# Patient Record
Sex: FEMALE | Race: WHITE | NOT HISPANIC OR LATINO | Employment: FULL TIME | URBAN - METROPOLITAN AREA
[De-identification: names, ages, dates, MRNs, and addresses within clinical notes are randomized per-mention and may not be internally consistent; named-entity substitution may affect disease eponyms.]

---

## 2017-03-07 ENCOUNTER — OFFICE VISIT (OUTPATIENT)
Dept: FAMILY MEDICINE CLINIC | Facility: CLINIC | Age: 52
End: 2017-03-07

## 2017-03-07 VITALS
WEIGHT: 140.2 LBS | HEART RATE: 76 BPM | SYSTOLIC BLOOD PRESSURE: 136 MMHG | DIASTOLIC BLOOD PRESSURE: 90 MMHG | BODY MASS INDEX: 27.52 KG/M2 | HEIGHT: 60 IN | OXYGEN SATURATION: 98 %

## 2017-03-07 DIAGNOSIS — J45.30 MILD PERSISTENT ASTHMA WITHOUT COMPLICATION: Primary | ICD-10-CM

## 2017-03-07 DIAGNOSIS — R03.0 ELEVATED BP WITHOUT DIAGNOSIS OF HYPERTENSION: ICD-10-CM

## 2017-03-07 DIAGNOSIS — Z82.49 FAMILY HISTORY OF CORONARY ARTERY DISEASE IN MOTHER: ICD-10-CM

## 2017-03-07 DIAGNOSIS — E78.00 HIGH CHOLESTEROL: ICD-10-CM

## 2017-03-07 PROBLEM — J45.909 ASTHMA: Status: ACTIVE | Noted: 2017-03-07

## 2017-03-07 PROCEDURE — 99204 OFFICE O/P NEW MOD 45 MIN: CPT | Performed by: INTERNAL MEDICINE

## 2017-03-07 NOTE — PROGRESS NOTES
Subjective   Carolin Kauffman is a 52 y.o. female who presents today for:    Hyperlipidemia (New patient); elevated BP; asthma.    History of Present Illness   She reports a long-standing history of asthma, dating back to childhood.  Off medicines until 1992, she was started on Ventolin for an asthma attack has been maintained on it ever since.  She has begun using it more frequently.  She uses a prior to jogging, but also uses it about once daily for wheezing and shortness of breath.  She has not had a pulmonary function test in several years.  She has never taken a maintenance medication.    She was started on pravastatin for hyperlipidemia in 2013, took it through 2014, but stopped that approximately one year later because there was not a significant decrease in her cholesterol levels.  She has not been on any other medication.  She tolerated the pravastatin without side effect.  She denies any cardiovascular or neurologic symptoms.  She does have a family history of heart disease in her mother, who had a heart attack at age 70.    In 2014, for primary care doctor recommended she start a blood pressure medication because of her mother's history of hypertension, and because of an isolated reading in the hypertensive range.  She reports normal blood pressure readings at her gynecologist office.    Ms. Kauffman  reports that she has never smoked. She has never used smokeless tobacco. She reports that she drinks alcohol. She reports that she does not use illicit drugs.         Current Outpatient Prescriptions:   •  calcium citrate-vitamin d (CITRACAL) 200-250 MG-UNIT tablet tablet, Take 2 tablets by mouth Daily., Disp: , Rfl:   •  VENTOLIN  (90 BASE) MCG/ACT inhaler, , Disp: , Rfl:       The following portions of the patient's history were reviewed and updated as appropriate: allergies, current medications, past social history, family hx, and problem list.    Review of Systems   Constitutional: Negative for  "diaphoresis.   Eyes: Negative for visual disturbance.   Respiratory: Positive for cough (Intermittent), shortness of breath (Intermittent) and wheezing (Intermittent). Negative for chest tightness.    Cardiovascular: Negative for chest pain, palpitations and leg swelling.   Gastrointestinal: Negative for abdominal pain.   Musculoskeletal: Negative for myalgias.   Neurological: Negative for dizziness, syncope, numbness and headaches.   Hematological: Does not bruise/bleed easily.       Objective   Vitals:    03/07/17 0903   BP: 136/90   BP Location: Left arm   Patient Position: Sitting   Cuff Size: Adult   Pulse: 76   SpO2: 98%   Weight: 140 lb 3.2 oz (63.6 kg)   Height: 60\" (152.4 cm)     Physical Exam  Well-developed, well-nourished, in no acute distress.  No periorbital edema.  Sclerae are anicteric and the conjunctivae are pink.  No carotid bruit.  No thyromegaly or mass.  No lymphadenopathy.  Regular rate and rhythm without murmur.  Normal S1 and S2.  No S3 or S4.  Clear to auscultation bilaterally with diminished breath sounds throughout all lung fields.  No wheezes or other adventitious breath sounds noted.  Normal respiratory effort noted.  Abdomen is soft, nondistended, nontender, with no mass or hepatosplenomegaly.  No abdominal bruit and normoactive bowel sounds noted.  No lower extremity edema and pedal pulses are full bilaterally.    Assessment/Plan   Carolin was seen today for hyperlipidemia.    Diagnoses and all orders for this visit:    Mild persistent asthma without complication  -     CBC (No Diff)  -     Spirometry With Bronchodilator    High cholesterol  -     Comprehensive Metabolic Panel  -     Lipid Panel    Elevated BP without diagnosis of hypertension  -     Comprehensive Metabolic Panel    Because she is using her Ventolin almost daily, I've asked her to undergo pulmonary function test pre-and post bronchodilator.  We will start her on a LABA/ICS if indicated by those results.  She may need " the spirometry with provocation.    We will not start her on blood pressure medicine today, but instead recommended therapeutic left-sided changes.  She should continue her exercise on a regular basis, particularly low-carb diet, and follow-up for blood pressure recheck in 3-6 months.    We discussed the various aspects of cholesterol management, including diet, exercise, and various statins.  She has only been treated with pravastatin 20 mg in the past, with minimal benefit to her cholesterol levels.  We will make further recommendations after we see her cholesterol levels.

## 2017-03-08 LAB
ALBUMIN SERPL-MCNC: 4.3 G/DL (ref 3.5–5.2)
ALBUMIN/GLOB SERPL: 1.8 G/DL
ALP SERPL-CCNC: 65 U/L (ref 39–117)
ALT SERPL-CCNC: 15 U/L (ref 1–33)
AST SERPL-CCNC: 19 U/L (ref 1–32)
BILIRUB SERPL-MCNC: 0.4 MG/DL (ref 0.1–1.2)
BUN SERPL-MCNC: 14 MG/DL (ref 6–20)
BUN/CREAT SERPL: 16.7 (ref 7–25)
CALCIUM SERPL-MCNC: 9.9 MG/DL (ref 8.6–10.5)
CHLORIDE SERPL-SCNC: 102 MMOL/L (ref 98–107)
CHOLEST SERPL-MCNC: 244 MG/DL (ref 0–200)
CO2 SERPL-SCNC: 26.6 MMOL/L (ref 22–29)
CREAT SERPL-MCNC: 0.84 MG/DL (ref 0.57–1)
ERYTHROCYTE [DISTWIDTH] IN BLOOD BY AUTOMATED COUNT: 14.9 % (ref 11.7–13)
GLOBULIN SER CALC-MCNC: 2.4 GM/DL
GLUCOSE SERPL-MCNC: 88 MG/DL (ref 65–99)
HCT VFR BLD AUTO: 39.7 % (ref 35.6–45.5)
HDLC SERPL-MCNC: 65 MG/DL (ref 40–60)
HGB BLD-MCNC: 13.2 G/DL (ref 11.9–15.5)
LDLC SERPL CALC-MCNC: 150 MG/DL (ref 0–100)
MCH RBC QN AUTO: 28.6 PG (ref 26.9–32)
MCHC RBC AUTO-ENTMCNC: 33.2 G/DL (ref 32.4–36.3)
MCV RBC AUTO: 85.9 FL (ref 80.5–98.2)
PLATELET # BLD AUTO: 236 10*3/MM3 (ref 140–500)
POTASSIUM SERPL-SCNC: 4.3 MMOL/L (ref 3.5–5.2)
PROT SERPL-MCNC: 6.7 G/DL (ref 6–8.5)
RBC # BLD AUTO: 4.62 10*6/MM3 (ref 3.9–5.2)
SODIUM SERPL-SCNC: 141 MMOL/L (ref 136–145)
TRIGL SERPL-MCNC: 145 MG/DL (ref 0–150)
VLDLC SERPL CALC-MCNC: 29 MG/DL (ref 5–40)
WBC # BLD AUTO: 5.18 10*3/MM3 (ref 4.5–10.7)

## 2017-03-22 ENCOUNTER — OFFICE VISIT (OUTPATIENT)
Dept: OBSTETRICS AND GYNECOLOGY | Age: 52
End: 2017-03-22

## 2017-03-22 VITALS
DIASTOLIC BLOOD PRESSURE: 72 MMHG | HEIGHT: 62 IN | BODY MASS INDEX: 25.21 KG/M2 | WEIGHT: 137 LBS | SYSTOLIC BLOOD PRESSURE: 112 MMHG

## 2017-03-22 DIAGNOSIS — Z01.419 ENCOUNTER FOR GYNECOLOGICAL EXAMINATION WITHOUT ABNORMAL FINDING: Primary | ICD-10-CM

## 2017-03-22 PROCEDURE — 99396 PREV VISIT EST AGE 40-64: CPT | Performed by: OBSTETRICS & GYNECOLOGY

## 2017-03-22 NOTE — PROGRESS NOTES
"Subjective     Chief Complaint   Patient presents with   • Gynecologic Exam     AC       History of Present Illness    Carolin Kauffman is a 52 y.o.  who presents for annual exam.  Patient has a significant history of irregular bleeding about 1 year ago with 1 month of daily bleeding.  She had a D&C done in  .  She was benign.   Her that patient had a couple of menses but no significant bleeding since October other than occasionally minimal spotting.    Patient works as a .  Is very active and is taken up running.  She just ran the city run.  Mother has osteoporosis that she developed later in life her mother has vertebral fractures and kyphosis.    Obstetric History:  OB History      Para Term  AB TAB SAB Ectopic Multiple Living    3 3                 Menstrual History:     No LMP recorded.         Current contraception: none  History of abnormal Pap smear: no  Received Gardasil immunization: no  Perform regular self breast exam: no  Family history of uterine or ovarian cancer: no  Family History of colon cancer: no  Family history of breast cancer: no    Mammogram: up to date. Mauricio normal in oct  Colonoscopy: up to date. Age 49 normal  DEXA: not indicated. Mom with osteoporosis. and vertebral fractures    Exercise: exercises 3 times a week  40-90 minutes  Calcium/Vitamin D: adequate intake and uses supplements    The following portions of the patient's history were reviewed and updated as appropriate: allergies, current medications, past family history, past medical history, past social history, past surgical history and problem list.    Review of Systems    A comprehensive review of systems was negative.     Objective   Physical Exam    /72  Ht 61.5\" (156.2 cm)  Wt 137 lb (62.1 kg)  BMI 25.47 kg/m2    General:   alert, appears stated age and cooperative   Neck: no asymmetry, masses, or scars, no adenopathy and thyroid normal to palpation   Heart: regular rate and rhythm "   Lungs: clear to auscultation bilaterally   Abdomen: soft, non-tender, without masses or organomegaly   Breast: inspection negative, no nipple discharge or bleeding, no masses or nodularity palpable   Vulva: normal, Bartholin's, Urethra, [place]'s normal   Vagina: normal mucosa, normal discharge   Cervix: no cervical motion tenderness and no lesions   Uterus: mobile, non-tender, normal shape and consistency   Adnexa: no mass, fullness, tenderness   Rectal: normal rectal, no masses     Assessment/Plan   Carolin was seen today for gynecologic exam.    Diagnoses and all orders for this visit:    Encounter for gynecological examination without abnormal finding  -     IGP, Aptima HPV, Rfx 16 / 18,45    The patient is doing well overall.  I recommend that we start checking bone density next year due to her family history of osteoporosis in her mother with vertebral fractures.  Bleeding is getting lighter and farther apart consistent with menopausal transition.  She will call if her bleeding increases.  All questions answered.  Breast self exam technique reviewed and patient encouraged to perform self-exam monthly.  Discussed healthy lifestyle modifications.  Recommended 30 minutes of aerobic exercise five times per week.  Discussed calcium needs to prevent osteoporosis.

## 2017-03-23 ENCOUNTER — HOSPITAL ENCOUNTER (OUTPATIENT)
Dept: RESPIRATORY THERAPY | Facility: HOSPITAL | Age: 52
Discharge: HOME | End: 2017-03-23
Attending: INTERNAL MEDICINE | Admitting: INTERNAL MEDICINE

## 2017-03-23 ENCOUNTER — HOSPITAL ENCOUNTER (OUTPATIENT)
Dept: CT IMAGING | Facility: HOSPITAL | Age: 52
Discharge: HOME | End: 2017-03-23
Attending: INTERNAL MEDICINE

## 2017-03-23 DIAGNOSIS — Z82.49 FAMILY HISTORY OF CORONARY ARTERY DISEASE IN MOTHER: ICD-10-CM

## 2017-03-23 DIAGNOSIS — E78.00 HIGH CHOLESTEROL: ICD-10-CM

## 2017-03-23 PROCEDURE — 94060 EVALUATION OF WHEEZING: CPT

## 2017-03-23 PROCEDURE — 75571 CT HRT W/O DYE W/CA TEST: CPT

## 2017-03-23 RX ORDER — ALBUTEROL SULFATE 2.5 MG/3ML
2.5 SOLUTION RESPIRATORY (INHALATION) ONCE
Status: COMPLETED | OUTPATIENT
Start: 2017-03-23 | End: 2017-03-23

## 2017-03-23 RX ADMIN — ALBUTEROL SULFATE 2.5 MG: 2.5 SOLUTION RESPIRATORY (INHALATION) at 08:33

## 2017-03-24 ENCOUNTER — TELEPHONE (OUTPATIENT)
Dept: OBSTETRICS AND GYNECOLOGY | Age: 52
End: 2017-03-24

## 2017-03-24 LAB
CYTOLOGIST CVX/VAG CYTO: NORMAL
CYTOLOGY CVX/VAG DOC THIN PREP: NORMAL
DX ICD CODE: NORMAL
HIV 1 & 2 AB SER-IMP: NORMAL
HPV I/H RISK 4 DNA CVX QL PROBE+SIG AMP: NEGATIVE
OTHER STN SPEC: NORMAL
PATH REPORT.FINAL DX SPEC: NORMAL
STAT OF ADQ CVX/VAG CYTO-IMP: NORMAL

## 2017-05-16 DIAGNOSIS — J45.30 MILD PERSISTENT ASTHMA WITHOUT COMPLICATION: Primary | ICD-10-CM

## 2017-05-16 RX ORDER — INHALER, ASSIST DEVICES
SPACER (EA) MISCELLANEOUS
Qty: 1 EACH | Refills: 2 | Status: SHIPPED | OUTPATIENT
Start: 2017-05-16 | End: 2018-03-01

## 2017-05-16 RX ORDER — BUDESONIDE AND FORMOTEROL FUMARATE DIHYDRATE 160; 4.5 UG/1; UG/1
2 AEROSOL RESPIRATORY (INHALATION) 2 TIMES DAILY
Qty: 10.2 G | Refills: 6 | Status: SHIPPED | OUTPATIENT
Start: 2017-05-16 | End: 2018-06-26 | Stop reason: SDUPTHER

## 2017-08-09 DIAGNOSIS — J45.30 MILD PERSISTENT ASTHMA WITHOUT COMPLICATION: Primary | ICD-10-CM

## 2017-10-02 ENCOUNTER — HOSPITAL ENCOUNTER (OUTPATIENT)
Dept: RESPIRATORY THERAPY | Facility: HOSPITAL | Age: 52
Discharge: HOME | End: 2017-10-02
Attending: INTERNAL MEDICINE | Admitting: INTERNAL MEDICINE

## 2017-10-02 DIAGNOSIS — J45.30 MILD PERSISTENT ASTHMA WITHOUT COMPLICATION: ICD-10-CM

## 2017-10-02 PROCEDURE — 94060 EVALUATION OF WHEEZING: CPT

## 2017-10-02 RX ORDER — ALBUTEROL SULFATE 2.5 MG/3ML
2.5 SOLUTION RESPIRATORY (INHALATION) ONCE
Status: COMPLETED | OUTPATIENT
Start: 2017-10-02 | End: 2017-10-02

## 2017-10-02 RX ADMIN — ALBUTEROL SULFATE 2.5 MG: 2.5 SOLUTION RESPIRATORY (INHALATION) at 08:25

## 2017-10-09 ENCOUNTER — TELEPHONE (OUTPATIENT)
Dept: OBSTETRICS AND GYNECOLOGY | Age: 52
End: 2017-10-09

## 2017-10-09 DIAGNOSIS — Z12.31 SCREENING MAMMOGRAM, ENCOUNTER FOR: Primary | ICD-10-CM

## 2017-10-26 ENCOUNTER — HOSPITAL ENCOUNTER (OUTPATIENT)
Dept: MAMMOGRAPHY | Facility: HOSPITAL | Age: 52
Discharge: HOME | End: 2017-10-26
Admitting: PHYSICIAN ASSISTANT

## 2017-10-26 DIAGNOSIS — Z12.31 SCREENING MAMMOGRAM, ENCOUNTER FOR: ICD-10-CM

## 2017-10-26 PROCEDURE — G0202 SCR MAMMO BI INCL CAD: HCPCS

## 2017-10-26 PROCEDURE — 77063 BREAST TOMOSYNTHESIS BI: CPT

## 2017-10-30 ENCOUNTER — TELEPHONE (OUTPATIENT)
Dept: OBSTETRICS AND GYNECOLOGY | Age: 52
End: 2017-10-30

## 2017-10-30 NOTE — TELEPHONE ENCOUNTER
----- Message from LUIS Salguero sent at 10/30/2017  8:56 AM EDT -----  Let her know her mammogram is wnl.

## 2018-02-13 ENCOUNTER — OFFICE VISIT (OUTPATIENT)
Dept: FAMILY MEDICINE CLINIC | Facility: CLINIC | Age: 53
End: 2018-02-13

## 2018-02-13 VITALS
HEIGHT: 62 IN | OXYGEN SATURATION: 98 % | WEIGHT: 140 LBS | BODY MASS INDEX: 25.76 KG/M2 | HEART RATE: 82 BPM | SYSTOLIC BLOOD PRESSURE: 120 MMHG | DIASTOLIC BLOOD PRESSURE: 82 MMHG

## 2018-02-13 DIAGNOSIS — R10.32 LEFT LOWER QUADRANT PAIN: Primary | ICD-10-CM

## 2018-02-13 LAB
ALBUMIN SERPL-MCNC: 4.3 G/DL (ref 3.5–5.2)
ALBUMIN/GLOB SERPL: 1.8 G/DL
ALP SERPL-CCNC: 63 U/L (ref 39–117)
ALT SERPL-CCNC: 16 U/L (ref 1–33)
AST SERPL-CCNC: 21 U/L (ref 1–32)
BASOPHILS # BLD AUTO: 0.03 10*3/MM3 (ref 0–0.2)
BASOPHILS NFR BLD AUTO: 0.5 % (ref 0–1.5)
BILIRUB SERPL-MCNC: 0.3 MG/DL (ref 0.1–1.2)
BUN SERPL-MCNC: 17 MG/DL (ref 6–20)
BUN/CREAT SERPL: 20.2 (ref 7–25)
CALCIUM SERPL-MCNC: 9.5 MG/DL (ref 8.6–10.5)
CHLORIDE SERPL-SCNC: 100 MMOL/L (ref 98–107)
CO2 SERPL-SCNC: 28.1 MMOL/L (ref 22–29)
CREAT SERPL-MCNC: 0.84 MG/DL (ref 0.57–1)
EOSINOPHIL # BLD AUTO: 0.3 10*3/MM3 (ref 0–0.7)
EOSINOPHIL NFR BLD AUTO: 5 % (ref 0.3–6.2)
ERYTHROCYTE [DISTWIDTH] IN BLOOD BY AUTOMATED COUNT: 13 % (ref 11.7–13)
GFR SERPLBLD CREATININE-BSD FMLA CKD-EPI: 71 ML/MIN/1.73
GFR SERPLBLD CREATININE-BSD FMLA CKD-EPI: 86 ML/MIN/1.73
GLOBULIN SER CALC-MCNC: 2.4 GM/DL
GLUCOSE SERPL-MCNC: 91 MG/DL (ref 65–99)
HCT VFR BLD AUTO: 40.8 % (ref 35.6–45.5)
HGB BLD-MCNC: 13.5 G/DL (ref 11.9–15.5)
IMM GRANULOCYTES # BLD: 0.02 10*3/MM3 (ref 0–0.03)
IMM GRANULOCYTES NFR BLD: 0.3 % (ref 0–0.5)
LYMPHOCYTES # BLD AUTO: 1.92 10*3/MM3 (ref 0.9–4.8)
LYMPHOCYTES NFR BLD AUTO: 31.9 % (ref 19.6–45.3)
MCH RBC QN AUTO: 30.1 PG (ref 26.9–32)
MCHC RBC AUTO-ENTMCNC: 33.1 G/DL (ref 32.4–36.3)
MCV RBC AUTO: 90.9 FL (ref 80.5–98.2)
MONOCYTES # BLD AUTO: 0.49 10*3/MM3 (ref 0.2–1.2)
MONOCYTES NFR BLD AUTO: 8.2 % (ref 5–12)
NEUTROPHILS # BLD AUTO: 3.25 10*3/MM3 (ref 1.9–8.1)
NEUTROPHILS NFR BLD AUTO: 54.1 % (ref 42.7–76)
PLATELET # BLD AUTO: 230 10*3/MM3 (ref 140–500)
POTASSIUM SERPL-SCNC: 4.4 MMOL/L (ref 3.5–5.2)
PROT SERPL-MCNC: 6.7 G/DL (ref 6–8.5)
RBC # BLD AUTO: 4.49 10*6/MM3 (ref 3.9–5.2)
SODIUM SERPL-SCNC: 141 MMOL/L (ref 136–145)
WBC # BLD AUTO: 6.01 10*3/MM3 (ref 4.5–10.7)

## 2018-02-13 PROCEDURE — 99213 OFFICE O/P EST LOW 20 MIN: CPT | Performed by: INTERNAL MEDICINE

## 2018-02-13 NOTE — PROGRESS NOTES
"Subjective   Carolin Kauffman is a 53 y.o. female who presents today for:    Abdominal Pain (LLQ x 2 months; intermitten pain)    History of Present Illness   Onset of LLQ abdominal pain in 12/2017, was fairly constant for a few days, then resolved. It has been intermittent since then, most recently 5 days ago and lasting only one day.  Might have improved with some ibuprofen.  Frequency has decreased from a few times per week to once a week.    Her last colonoscopy was on September 11, 2015 was normal.  Report reviewed.    Ms. Kauffman  reports that she has never smoked. She has never used smokeless tobacco. She reports that she drinks alcohol. She reports that she does not use illicit drugs.     Allergies   Allergen Reactions   • Peanut Oil      Palm oil wheezing and throat closure       Current Outpatient Prescriptions:   •  budesonide-formoterol (SYMBICORT) 160-4.5 MCG/ACT inhaler, Inhale 2 puffs 2 (Two) Times a Day., Disp: 10.2 g, Rfl: 6  •  Spacer/Aero-Holding Chambers (AEROCHAMBER MV) inhaler, Use as instructed, Disp: 1 each, Rfl: 2  •  VENTOLIN  (90 BASE) MCG/ACT inhaler, , Disp: , Rfl:   •  calcium citrate-vitamin d (CITRACAL) 200-250 MG-UNIT tablet tablet, Take 2 tablets by mouth Daily., Disp: , Rfl:       Review of Systems   Constitutional: Negative for chills, diaphoresis and fever.   Respiratory: Negative for shortness of breath.    Cardiovascular: Negative for chest pain.   Gastrointestinal: Positive for abdominal pain. Negative for abdominal distention, blood in stool, constipation, diarrhea and nausea.   Genitourinary: Negative for flank pain and vaginal discharge.        Amenorrheic for almost a year         Objective   Vitals:    02/13/18 1005   BP: 120/82   BP Location: Left arm   Patient Position: Sitting   Cuff Size: Adult   Pulse: 82   SpO2: 98%   Weight: 63.5 kg (140 lb)   Height: 156.2 cm (61.5\")     Physical Exam  Well-developed, well-nourished, in good spirits.  Sclerae are " anicteric.  Regular rate and rhythm.  No murmur.  Normal respiratory effort.  Clear bilaterally.  Abdomen is soft, nondistended, with no evidence of hepatosplenomegaly or mass.  She is nontender to palpation over the entire abdomen.  Bowel sounds are normoactive.  No abdominal bruit appreciated.      Assessment/Plan   Carolin was seen today for abdominal pain.    Diagnoses and all orders for this visit:    Left lower quadrant pain  -     CBC & Differential  -     Comprehensive Metabolic Panel  -     POC Urinalysis Dipstick, Automated    Symptoms are waning, and she is a symptomatic today.  We will check the labs as ordered.  We will avoid doing any imaging studies unless her symptoms escalate or new symptoms develop.  She was advised to contact us if either of those 2 situations develop.    She is due to see her gynecologist next month.  If her symptoms smolder until then, she will address with her gynecologist as well in case a pelvic ultrasound as indicated.    She should follow up with us for her asthma within the next 2-3 months as well.

## 2018-03-01 ENCOUNTER — OFFICE VISIT (OUTPATIENT)
Dept: OBSTETRICS AND GYNECOLOGY | Age: 53
End: 2018-03-01

## 2018-03-01 ENCOUNTER — PROCEDURE VISIT (OUTPATIENT)
Dept: OBSTETRICS AND GYNECOLOGY | Age: 53
End: 2018-03-01

## 2018-03-01 VITALS
SYSTOLIC BLOOD PRESSURE: 122 MMHG | DIASTOLIC BLOOD PRESSURE: 72 MMHG | BODY MASS INDEX: 24.66 KG/M2 | HEIGHT: 62 IN | WEIGHT: 134 LBS

## 2018-03-01 DIAGNOSIS — R10.32 LEFT LOWER QUADRANT ABDOMINAL PAIN OF UNKNOWN ETIOLOGY: Primary | ICD-10-CM

## 2018-03-01 DIAGNOSIS — R10.32 LLQ PAIN: ICD-10-CM

## 2018-03-01 DIAGNOSIS — R10.32 LEFT LOWER QUADRANT PAIN: Primary | ICD-10-CM

## 2018-03-01 PROCEDURE — 76830 TRANSVAGINAL US NON-OB: CPT | Performed by: OBSTETRICS & GYNECOLOGY

## 2018-03-01 PROCEDURE — 99213 OFFICE O/P EST LOW 20 MIN: CPT | Performed by: OBSTETRICS & GYNECOLOGY

## 2018-03-01 NOTE — PROGRESS NOTES
"  Chief complaint-lower quadrant pain    History of present illness- Patient is a 53 y.o.  who complains of LLQ pain.  Pain started in December and occurs about once per week.  It is a dull cramping pain that lasts for a few hours.  Better with Advil and worse with movement.  Patient is actually running about 16 miles a week and with running she does not have the pain or any exacerbation.      Patient had a normal colonoscopy at age 49.    She is having a regular soft bowel movements daily without straining.    She is not having any vaginal bleeding.      Review of Systems   Constitutional: Negative for fatigue.   Respiratory: Negative for shortness of breath.    Gastrointestinal: Positive for abdominal pain.   Genitourinary: Negative for dysuria.   Neurological: Negative for headaches.   Psychiatric/Behavioral: Negative for dysphoric mood.     /72  Ht 156.2 cm (61.5\")  Wt 60.8 kg (134 lb)  LMP 2017  BMI 24.91 kg/m2  Physical Exam   Constitutional: She appears well-developed and well-nourished. No distress.   Genitourinary:   Genitourinary Comments: External genitalia appear normal.  Vagina is normal without discharge.  The cervix has no lesions.  On bimanual exam the uterus is anteverted and mobile with no tenderness.  No adnexal masses are palpated.    Rectal exam reveals no masses.   Pulmonary/Chest: Effort normal.   Abdominal: Soft. Bowel sounds are normal. She exhibits no distension and no mass. There is no tenderness. There is no rebound and no guarding. No hernia.   Psychiatric: She has a normal mood and affect. Her behavior is normal. Judgment and thought content normal.       Pelvic ultrasound shows a normal-appearing uterus that measures 6 x 4 x 3 cm.  No masses are seen.  The endometrium is normal at 3 mm.  Left ovary is normal in appearance measuring 1.7 x 1.5 x 1.6 cm.  The right ovary is also normal in appearance measuring 3.1 x 2.5 x 2.0 cm.  No free fluid is seen.    Carolin " was seen today for follow-up.    Diagnoses and all orders for this visit:    Left lower quadrant pain  -     Urine Culture - Urine, Urine, Clean Catch    No GYN source is seen for the left lower quadrant pain.    We discussed possible other causes including musculoskeletal GI or urinary.  Urine dip is not impressive but we will send urine for culture.  Patient's labs were reviewed.  They're remarkable for a normal CMP and CBC.  Patient does not seem to be having any bowel issues.  She is running so musculoskeletal source with seem possible but she is not having any of the pain with running.    We discussed keeping a diary of the pain.  Patient will record which she has eaten or done prior to the pain.  She is scheduled to see Dr. Canela about a month.  She is comfortable with waiting until that time to see if any further evaluation needs to be done.  She will call if there is any worsening of the pain.      20 minutes were spent with the visit.  15 minutes in face-to-face counseling with the patient.

## 2018-03-04 LAB
BACTERIA UR CULT: NORMAL
BACTERIA UR CULT: NORMAL

## 2018-03-05 ENCOUNTER — TELEPHONE (OUTPATIENT)
Dept: OBSTETRICS AND GYNECOLOGY | Age: 53
End: 2018-03-05

## 2018-04-24 ENCOUNTER — OFFICE VISIT (OUTPATIENT)
Dept: FAMILY MEDICINE CLINIC | Facility: CLINIC | Age: 53
End: 2018-04-24

## 2018-04-24 VITALS
HEIGHT: 62 IN | HEART RATE: 57 BPM | WEIGHT: 134 LBS | BODY MASS INDEX: 24.66 KG/M2 | DIASTOLIC BLOOD PRESSURE: 84 MMHG | OXYGEN SATURATION: 97 % | SYSTOLIC BLOOD PRESSURE: 134 MMHG

## 2018-04-24 DIAGNOSIS — Z00.00 ROUTINE GENERAL MEDICAL EXAMINATION AT HEALTH CARE FACILITY: Primary | ICD-10-CM

## 2018-04-24 DIAGNOSIS — Z23 NEED FOR DIPHTHERIA-TETANUS-PERTUSSIS (TDAP) VACCINE, ADULT/ADOLESCENT: ICD-10-CM

## 2018-04-24 PROCEDURE — 90471 IMMUNIZATION ADMIN: CPT | Performed by: INTERNAL MEDICINE

## 2018-04-24 PROCEDURE — 90715 TDAP VACCINE 7 YRS/> IM: CPT | Performed by: INTERNAL MEDICINE

## 2018-04-24 PROCEDURE — 99396 PREV VISIT EST AGE 40-64: CPT | Performed by: INTERNAL MEDICINE

## 2018-04-24 NOTE — PROGRESS NOTES
"Subjective   Carolin Kauffman is a 53 y.o. female who presents today for:    Annual Exam    History of Present Illness   Recall the timing of her last tetanus booster.    She had her last colonoscopy in 2015 and it was normal.    She sees a gynecologist for her routine checkups.  She sees a dermatologist once a year.  She sees an eye doctor regularly.    Ms. Kauffman  reports that she has never smoked. She has never used smokeless tobacco. She reports that she drinks alcohol. She reports that she does not use drugs.     Allergies   Allergen Reactions   • Peanut Oil Anaphylaxis     Palm oil wheezing and throat closure   • Palm Oil [Hydrogenated Palm Oil Glycerides] Rash       Current Outpatient Prescriptions:   •  budesonide-formoterol (SYMBICORT) 160-4.5 MCG/ACT inhaler, Inhale 2 puffs 2 (Two) Times a Day. (Patient taking differently: Inhale 1 puff Daily.), Disp: 10.2 g, Rfl: 6  •  VENTOLIN  (90 BASE) MCG/ACT inhaler, , Disp: , Rfl:       Review of Systems   Constitutional: Negative for diaphoresis.   HENT: Negative.    Eyes: Negative for visual disturbance.   Respiratory: Negative for chest tightness, shortness of breath and wheezing.         Symptoms controlled with Symbicort   Cardiovascular: Negative for chest pain, palpitations and leg swelling.   Gastrointestinal: Negative for abdominal pain.   Genitourinary: Negative.    Musculoskeletal: Negative for myalgias.   Skin: Negative.    Neurological: Negative for dizziness, syncope, numbness and headaches.   Hematological: Does not bruise/bleed easily.   Psychiatric/Behavioral: Negative.          Objective   Vitals:    04/24/18 1535   BP: 134/84   BP Location: Left arm   Patient Position: Sitting   Cuff Size: Adult   Pulse: 57   SpO2: 97%   Weight: 60.8 kg (134 lb)   Height: 156.2 cm (61.5\")     Physical Exam    Well-developed, well-nourished, in no acute distress.  Sclerae are anicteric and the conjunctivae are pink.  No thyromegaly or mass.  No thyroid " tenderness.  No cervical, supraclavicular, or inguinal lymphadenopathy.  Regular rate and rhythm.  No murmur appreciated.  Clear to auscultation bilaterally.  Breath sounds are normal bilaterally.  Respiratory effort is normal.  Abdomen is soft, nondistended, nontender.  Bowel sounds are normoactive.  No bruit appreciated.  No lower extremity edema and pedal pulses are full bilaterally.  Marked freckling over the trunk and extremities.  No suspicious lesions appreciated.  There is a very dark, 2 mm diameter, slightly raised purple nevus on the dorsum of the left hand between the thumb and index finger  Mood is upbeat and affect is appropriate.  No gross motor neurologic deficit appreciated.  She is able to ascend and descend from the exam table fluidly and without assistance.          Carolin was seen today for annual exam.    Diagnoses and all orders for this visit:    Routine general medical examination at health care facility  -     CBC (No Diff)  -     Comprehensive Metabolic Panel  -     Lipid Panel  -     Urinalysis With / Microscopic If Indicated - Urine, Clean Catch    Need for diphtheria-tetanus-pertussis (Tdap) vaccine, adult/adolescent  -     Tdap Vaccine Greater Than or Equal To 6yo IM    We discussed age-appropriate health maintenance issues.  I recommended that she get a tetanus booster today.  I also recommended she get a hepatitis A booster now and in 6 months because of the recent outbreak.  She will check with a local pharmacy for its availability.    She should continue seeing her dermatologist and her gynecologist on a regular basis.  We briefly touched on the shingles vaccine; she would like to wait until next year before considering getting it.    We will see her in follow-up in one year unless there are issues with her labs.  She knows to contact us in the interim if there are any problems.    She is doing well on her Symbicort 1 puff once or twice a day.  I asked her to increase its dose if  she uses her rescue inhaler more than twice a week.  As it stands, she has not used her rescue inhaler in weeks to months.

## 2018-04-26 LAB
ALBUMIN SERPL-MCNC: 4.7 G/DL (ref 3.5–5.5)
ALBUMIN/GLOB SERPL: 1.9 {RATIO} (ref 1.2–2.2)
ALP SERPL-CCNC: 75 IU/L (ref 39–117)
ALT SERPL-CCNC: 20 IU/L (ref 0–32)
APPEARANCE UR: CLEAR
AST SERPL-CCNC: 26 IU/L (ref 0–40)
BACTERIA #/AREA URNS HPF: ABNORMAL /[HPF]
BILIRUB SERPL-MCNC: 0.5 MG/DL (ref 0–1.2)
BILIRUB UR QL STRIP: NEGATIVE
BUN SERPL-MCNC: 14 MG/DL (ref 6–24)
BUN/CREAT SERPL: 16 (ref 9–23)
CALCIUM SERPL-MCNC: 9.7 MG/DL (ref 8.7–10.2)
CHLORIDE SERPL-SCNC: 98 MMOL/L (ref 96–106)
CHOLEST SERPL-MCNC: 241 MG/DL (ref 100–199)
CO2 SERPL-SCNC: 26 MMOL/L (ref 18–29)
COLOR UR: YELLOW
CREAT SERPL-MCNC: 0.86 MG/DL (ref 0.57–1)
EPI CELLS #/AREA URNS HPF: >10 /HPF
ERYTHROCYTE [DISTWIDTH] IN BLOOD BY AUTOMATED COUNT: 13.6 % (ref 12.3–15.4)
GFR SERPLBLD CREATININE-BSD FMLA CKD-EPI: 77 ML/MIN/1.73
GFR SERPLBLD CREATININE-BSD FMLA CKD-EPI: 89 ML/MIN/1.73
GLOBULIN SER CALC-MCNC: 2.5 G/DL (ref 1.5–4.5)
GLUCOSE SERPL-MCNC: 86 MG/DL (ref 65–99)
GLUCOSE UR QL: NEGATIVE
HCT VFR BLD AUTO: 42.1 % (ref 34–46.6)
HDLC SERPL-MCNC: 76 MG/DL
HGB BLD-MCNC: 14.2 G/DL (ref 11.1–15.9)
HGB UR QL STRIP: NEGATIVE
KETONES UR QL STRIP: NEGATIVE
LDLC SERPL CALC-MCNC: 145 MG/DL (ref 0–99)
LEUKOCYTE ESTERASE UR QL STRIP: ABNORMAL
MCH RBC QN AUTO: 29.6 PG (ref 26.6–33)
MCHC RBC AUTO-ENTMCNC: 33.7 G/DL (ref 31.5–35.7)
MCV RBC AUTO: 88 FL (ref 79–97)
MICRO URNS: ABNORMAL
NITRITE UR QL STRIP: NEGATIVE
PH UR STRIP: 5 [PH] (ref 5–7.5)
PLATELET # BLD AUTO: 253 X10E3/UL (ref 150–379)
POTASSIUM SERPL-SCNC: 4 MMOL/L (ref 3.5–5.2)
PROT SERPL-MCNC: 7.2 G/DL (ref 6–8.5)
PROT UR QL STRIP: NEGATIVE
RBC # BLD AUTO: 4.79 X10E6/UL (ref 3.77–5.28)
RBC #/AREA URNS HPF: ABNORMAL /HPF
SODIUM SERPL-SCNC: 139 MMOL/L (ref 134–144)
SP GR UR: 1.02 (ref 1–1.03)
TRIGL SERPL-MCNC: 102 MG/DL (ref 0–149)
UROBILINOGEN UR STRIP-MCNC: 0.2 MG/DL (ref 0.2–1)
VLDLC SERPL CALC-MCNC: 20 MG/DL (ref 5–40)
WBC # BLD AUTO: 6.7 X10E3/UL (ref 3.4–10.8)
WBC #/AREA URNS HPF: ABNORMAL /HPF

## 2018-05-01 ENCOUNTER — OFFICE VISIT (OUTPATIENT)
Dept: FAMILY MEDICINE CLINIC | Facility: CLINIC | Age: 53
End: 2018-05-01

## 2018-05-01 VITALS
SYSTOLIC BLOOD PRESSURE: 134 MMHG | WEIGHT: 132.5 LBS | DIASTOLIC BLOOD PRESSURE: 86 MMHG | OXYGEN SATURATION: 100 % | BODY MASS INDEX: 24.38 KG/M2 | HEART RATE: 60 BPM | HEIGHT: 62 IN

## 2018-05-01 DIAGNOSIS — R30.0 DYSURIA: Primary | ICD-10-CM

## 2018-05-01 DIAGNOSIS — R35.0 URINARY FREQUENCY: ICD-10-CM

## 2018-05-01 LAB
CLARITY, POC: CLEAR
COLOR UR: NORMAL
GLUCOSE UR STRIP-MCNC: NEGATIVE MG/DL
KETONES UR QL: NEGATIVE
LEUKOCYTE EST, POC: NEGATIVE
NITRITE UR-MCNC: NEGATIVE MG/ML
PH UR: 7 [PH] (ref 5–8)
PROT UR STRIP-MCNC: NEGATIVE MG/DL
RBC # UR STRIP: NEGATIVE /UL
SP GR UR: 1.01 (ref 1–1.03)

## 2018-05-01 PROCEDURE — 99213 OFFICE O/P EST LOW 20 MIN: CPT | Performed by: INTERNAL MEDICINE

## 2018-05-01 PROCEDURE — 81003 URINALYSIS AUTO W/O SCOPE: CPT | Performed by: INTERNAL MEDICINE

## 2018-05-01 RX ORDER — SULFAMETHOXAZOLE AND TRIMETHOPRIM 400; 80 MG/1; MG/1
1 TABLET ORAL 2 TIMES DAILY
Qty: 10 TABLET | Refills: 0 | Status: SHIPPED | OUTPATIENT
Start: 2018-05-01 | End: 2019-05-20

## 2018-05-01 RX ORDER — PHENAZOPYRIDINE HYDROCHLORIDE 200 MG/1
200 TABLET, FILM COATED ORAL 3 TIMES DAILY PRN
Qty: 6 TABLET | Refills: 0 | Status: SHIPPED | OUTPATIENT
Start: 2018-05-01 | End: 2019-05-20

## 2018-05-01 NOTE — PROGRESS NOTES
"Urinary Tract Infection (x 3 days)    Relatively acute onset of urinary frequency and urinary urgency 2-3 days ago.  Symptoms have not improved.    ROS: She denies hematuria.  She denies fever and chills.  She denies significant back pain, although she is a little sore after running the mini marathon 4 days ago.    She has had urinary tract infections in the past, but not a recurrent, frequent basis.    She has never smoked.    /86 (BP Location: Left arm, Patient Position: Sitting, Cuff Size: Adult)   Pulse 60   Ht 156.2 cm (61.5\")   Wt 60.1 kg (132 lb 8 oz)   SpO2 100%   Breastfeeding? No   BMI 24.63 kg/m²     EXAM:    Mild suprapubic discomfort.  No CVA tenderness.    Carolin was seen today for urinary tract infection.    Diagnoses and all orders for this visit:    Dysuria  -     POC Urinalysis Dipstick, Automated    Urinary frequency  -     POC Urinalysis Dipstick, Automated  -     sulfamethoxazole-trimethoprim (BACTRIM) 400-80 MG tablet; Take 1 tablet by mouth 2 (Two) Times a Day.  -     phenazopyridine (PYRIDIUM) 200 MG tablet; Take 1 tablet by mouth 3 (Three) Times a Day As Needed for bladder spasms.      Urinalysis is relatively benign, but it is dilute.  We will treat empirically with Bactrim, cautioning her regarding the risk of sunburn.  She will take the Pyridium up to 3 times a day for 2 days, stopping it in the interim if her symptoms subside.  "

## 2018-06-27 RX ORDER — BUDESONIDE AND FORMOTEROL FUMARATE DIHYDRATE 160; 4.5 UG/1; UG/1
2 AEROSOL RESPIRATORY (INHALATION) 2 TIMES DAILY
Qty: 10.2 G | Refills: 6 | Status: SHIPPED | OUTPATIENT
Start: 2018-06-27 | End: 2018-07-02 | Stop reason: SDUPTHER

## 2018-07-02 RX ORDER — BUDESONIDE AND FORMOTEROL FUMARATE DIHYDRATE 160; 4.5 UG/1; UG/1
2 AEROSOL RESPIRATORY (INHALATION) 2 TIMES DAILY
Qty: 10.2 G | Refills: 6 | Status: SHIPPED | OUTPATIENT
Start: 2018-07-02 | End: 2019-07-31 | Stop reason: SDUPTHER

## 2019-02-11 ENCOUNTER — TRANSCRIBE ORDERS (OUTPATIENT)
Dept: ADMINISTRATIVE | Facility: HOSPITAL | Age: 54
End: 2019-02-11

## 2019-02-11 DIAGNOSIS — Z12.31 VISIT FOR SCREENING MAMMOGRAM: Primary | ICD-10-CM

## 2019-02-12 ENCOUNTER — HOSPITAL ENCOUNTER (OUTPATIENT)
Dept: MAMMOGRAPHY | Facility: HOSPITAL | Age: 54
Discharge: HOME | End: 2019-02-12
Admitting: OBSTETRICS & GYNECOLOGY

## 2019-02-12 DIAGNOSIS — Z12.31 VISIT FOR SCREENING MAMMOGRAM: ICD-10-CM

## 2019-02-12 PROCEDURE — 77067 SCR MAMMO BI INCL CAD: CPT

## 2019-02-12 PROCEDURE — 77063 BREAST TOMOSYNTHESIS BI: CPT

## 2019-05-20 ENCOUNTER — OFFICE VISIT (OUTPATIENT)
Dept: INTERNAL MEDICINE | Facility: CLINIC | Age: 54
End: 2019-05-20

## 2019-05-20 VITALS
HEIGHT: 61 IN | DIASTOLIC BLOOD PRESSURE: 92 MMHG | BODY MASS INDEX: 24.73 KG/M2 | WEIGHT: 131 LBS | TEMPERATURE: 97.7 F | RESPIRATION RATE: 16 BRPM | HEART RATE: 86 BPM | SYSTOLIC BLOOD PRESSURE: 136 MMHG | OXYGEN SATURATION: 92 %

## 2019-05-20 DIAGNOSIS — J45.20 MILD INTERMITTENT ASTHMA WITHOUT COMPLICATION: ICD-10-CM

## 2019-05-20 DIAGNOSIS — Z00.00 ENCOUNTER FOR HEALTH MAINTENANCE EXAMINATION: Primary | ICD-10-CM

## 2019-05-20 DIAGNOSIS — Z11.59 NEED FOR HEPATITIS C SCREENING TEST: ICD-10-CM

## 2019-05-20 DIAGNOSIS — I10 ESSENTIAL HYPERTENSION: ICD-10-CM

## 2019-05-20 LAB
BILIRUB BLD-MCNC: NEGATIVE MG/DL
CLARITY, POC: CLEAR
COLOR UR: YELLOW
GLUCOSE UR STRIP-MCNC: NEGATIVE MG/DL
KETONES UR QL: NEGATIVE
LEUKOCYTE EST, POC: ABNORMAL
NITRITE UR-MCNC: NEGATIVE MG/ML
PH UR: 5.5 [PH] (ref 5–8)
PROT UR STRIP-MCNC: NEGATIVE MG/DL
RBC # UR STRIP: NEGATIVE /UL
SP GR UR: 1.01 (ref 1–1.03)
UROBILINOGEN UR QL: NORMAL

## 2019-05-20 PROCEDURE — 99396 PREV VISIT EST AGE 40-64: CPT | Performed by: INTERNAL MEDICINE

## 2019-05-20 PROCEDURE — 81003 URINALYSIS AUTO W/O SCOPE: CPT | Performed by: INTERNAL MEDICINE

## 2019-05-20 PROCEDURE — 93000 ELECTROCARDIOGRAM COMPLETE: CPT | Performed by: INTERNAL MEDICINE

## 2019-05-20 PROCEDURE — 99213 OFFICE O/P EST LOW 20 MIN: CPT | Performed by: INTERNAL MEDICINE

## 2019-05-20 RX ORDER — VALSARTAN 80 MG/1
80 TABLET ORAL DAILY
Qty: 30 TABLET | Refills: 2 | Status: SHIPPED | OUTPATIENT
Start: 2019-05-20 | End: 2019-11-18 | Stop reason: SDUPTHER

## 2019-05-20 RX ORDER — ALBUTEROL SULFATE 90 UG/1
2 AEROSOL, METERED RESPIRATORY (INHALATION) 2 TIMES DAILY
COMMUNITY
Start: 2018-06-25 | End: 2020-12-21 | Stop reason: SDUPTHER

## 2019-05-20 NOTE — PROGRESS NOTES
Subjective   Carolin Kauffman is a 54 y.o. female.     Chief Complaint   Patient presents with   • Annual Exam     new patient         In for initial visit, transition of care, and annual preventive exam.  Former patient of Dr. Henderson.  Sleeps 6 to 7 hours per night.  Exercises 3 days/week.  Energy is good.  Diet is well-balanced.  She has lifelong asthma that is been under pretty good control with medication.      Hypertension   This is a new problem. The current episode started more than 1 year ago. The problem is unchanged. Pertinent negatives include no chest pain, headaches, palpitations or shortness of breath. There are no associated agents to hypertension. Risk factors for coronary artery disease include family history and post-menopausal state. Past treatments include nothing. There is no history of angina, kidney disease, CAD/MI, CVA or heart failure.        The following portions of the patient's history were reviewed and updated as appropriate: allergies, current medications, past social history and problem list.    Outpatient Medications Marked as Taking for the 5/20/19 encounter (Office Visit) with J Carlos Dodson MD   Medication Sig Dispense Refill   • albuterol sulfate HFA (VENTOLIN HFA) 108 (90 Base) MCG/ACT inhaler Take 2 puffs by mouth 2 (Two) Times a Day.     • budesonide-formoterol (SYMBICORT) 160-4.5 MCG/ACT inhaler Inhale 2 puffs 2 (Two) Times a Day. 10.2 g 6       Review of Systems   Constitutional: Positive for diaphoresis. Negative for appetite change, chills, fatigue and fever.   HENT: Negative for congestion, ear pain, hearing loss, nosebleeds, postnasal drip, rhinorrhea, sinus pressure and trouble swallowing.    Eyes: Negative for pain, itching and visual disturbance.   Respiratory: Positive for wheezing. Negative for cough, chest tightness and shortness of breath.    Cardiovascular: Negative for chest pain, palpitations and leg swelling.   Gastrointestinal: Negative for abdominal pain,  anal bleeding, constipation, diarrhea, nausea, rectal pain and vomiting.   Endocrine: Negative for cold intolerance, heat intolerance and polyuria.   Genitourinary: Negative for difficulty urinating, dysuria, flank pain, frequency, hematuria and urgency.   Musculoskeletal: Positive for arthralgias. Negative for back pain and myalgias.   Skin: Negative for rash.   Allergic/Immunologic: Negative for environmental allergies.   Neurological: Negative for dizziness, syncope, speech difficulty, weakness, light-headedness, numbness and headaches.   Hematological: Does not bruise/bleed easily.   Psychiatric/Behavioral: Negative for agitation, confusion and sleep disturbance. The patient is not nervous/anxious.        Objective   Vitals:    05/20/19 1510   BP: 136/92   Pulse: 86   Resp: 16   Temp: 97.7 °F (36.5 °C)   SpO2: 92%      Wt Readings from Last 3 Encounters:   05/20/19 59.4 kg (131 lb)   05/01/18 60.1 kg (132 lb 8 oz)   04/24/18 60.8 kg (134 lb)    Body mass index is 24.35 kg/m².      Physical Exam   Constitutional: She is oriented to person, place, and time. She appears well-developed and well-nourished.   HENT:   Head: Normocephalic and atraumatic.   Right Ear: External ear normal.   Left Ear: External ear normal.   Nose: Nose normal.   Mouth/Throat: Oropharynx is clear and moist.   Eyes: Conjunctivae and EOM are normal. Pupils are equal, round, and reactive to light.   Neck: Normal range of motion. Neck supple. No JVD present. No thyromegaly present.   Cardiovascular: Normal rate, regular rhythm, normal heart sounds and intact distal pulses. Exam reveals no gallop.   No murmur heard.  Pulmonary/Chest: Effort normal and breath sounds normal. No respiratory distress. She has no wheezes. She has no rales.   Abdominal: Soft. Bowel sounds are normal. She exhibits no distension and no mass. There is no tenderness. There is no guarding. No hernia.   Musculoskeletal: Normal range of motion. She exhibits no edema.    Lymphadenopathy:     She has no cervical adenopathy.   Neurological: She is alert and oriented to person, place, and time. She displays normal reflexes. No cranial nerve deficit. Coordination normal.   Skin: Skin is warm and dry.   Psychiatric: She has a normal mood and affect. Her behavior is normal. Judgment and thought content normal.   Nursing note and vitals reviewed.        Problem List Items Addressed This Visit        Cardiovascular and Mediastinum    Essential hypertension       Respiratory    Asthma    Relevant Medications    albuterol sulfate HFA (VENTOLIN HFA) 108 (90 Base) MCG/ACT inhaler      Other Visit Diagnoses     Encounter for health maintenance examination    -  Primary    Relevant Orders    POCT urinalysis dipstick, automated (Completed)        Assessment/Plan   In for initial visit, transition of care, and annual preventive exam.  Overall health is excellent.  She has a history of lifelong asthma that is under good control.  Cholesterol runs little high but she has an excellent HDL cholesterol.  Coronary risk is very low.  Blood pressure runs high and has been for some time.  Will begin on medication today, Diovan 80 mg daily.  We will recheck blood pressure in 1 month and see where she stands.  She is due for annual lab work today which will include CBC, CMP, lipids, UA.  We will check an EKG today with her hypertension.  She is due for Shingrix vaccine.  Due for hep C antibody.  She worries about osteoporosis being on an inhaled steroid.  She is only taking 1 puff a day of Symbicort 160.  I think the risk is awfully low at that dosage range.  Advised patient that although the concern is real it is unlikely to be of clinical significance for her.  We can always try an 80 mcg dose at some point in time.      ECG 12 Lead  Date/Time: 5/20/2019 5:06 PM  Performed by: J Carlos Dodson MD  Authorized by: J Carlos Dodson MD   Comparison: not compared with previous ECG   Previous ECG: no previous ECG  available  Rhythm: sinus bradycardia  Rate: bradycardic  Conduction: conduction normal  ST Segments: ST segments normal  T Waves: T waves normal  QRS axis: normal  Other: no other findings    Clinical impression: normal ECG  Comments: Sinus bradycardia.  Heart rate is 56.  There is intraventricular conduction delay of the right type.  There is borderline evidence of right atrial enlargement.  Nonspecific ST changes.  This is a borderline EKG.  There is no prior EKG available for comparison.                       Dragon disclaimer:   Much of this encounter note is an electronic transcription/translation of spoken language to printed text. The electronic translation of spoken language may permit erroneous, or at times, nonsensical words or phrases to be inadvertently transcribed; Although I have reviewed the note for such errors, some may still exist.

## 2019-05-21 LAB
ALBUMIN SERPL-MCNC: 4.3 G/DL (ref 3.5–5.2)
ALBUMIN/GLOB SERPL: 1.6 G/DL
ALP SERPL-CCNC: 97 U/L (ref 39–117)
ALT SERPL-CCNC: 20 U/L (ref 1–33)
AST SERPL-CCNC: 27 U/L (ref 1–32)
BASOPHILS # BLD AUTO: 0.03 10*3/MM3 (ref 0–0.2)
BASOPHILS NFR BLD AUTO: 0.5 % (ref 0–1.5)
BILIRUB SERPL-MCNC: 0.7 MG/DL (ref 0.2–1.2)
BUN SERPL-MCNC: 12 MG/DL (ref 6–20)
BUN/CREAT SERPL: 13.3 (ref 7–25)
CALCIUM SERPL-MCNC: 10.3 MG/DL (ref 8.6–10.5)
CHLORIDE SERPL-SCNC: 102 MMOL/L (ref 98–107)
CHOLEST SERPL-MCNC: 254 MG/DL (ref 0–200)
CO2 SERPL-SCNC: 28.4 MMOL/L (ref 22–29)
CREAT SERPL-MCNC: 0.9 MG/DL (ref 0.57–1)
EOSINOPHIL # BLD AUTO: 0.25 10*3/MM3 (ref 0–0.4)
EOSINOPHIL NFR BLD AUTO: 4.5 % (ref 0.3–6.2)
ERYTHROCYTE [DISTWIDTH] IN BLOOD BY AUTOMATED COUNT: 12.5 % (ref 12.3–15.4)
GLOBULIN SER CALC-MCNC: 2.7 GM/DL
GLUCOSE SERPL-MCNC: 86 MG/DL (ref 65–99)
HCT VFR BLD AUTO: 45.5 % (ref 34–46.6)
HCV AB S/CO SERPL IA: 0.1 S/CO RATIO (ref 0–0.9)
HDLC SERPL-MCNC: 85 MG/DL (ref 40–60)
HGB BLD-MCNC: 14.6 G/DL (ref 12–15.9)
IMM GRANULOCYTES # BLD AUTO: 0.02 10*3/MM3 (ref 0–0.05)
IMM GRANULOCYTES NFR BLD AUTO: 0.4 % (ref 0–0.5)
LDLC SERPL CALC-MCNC: 149 MG/DL (ref 0–100)
LYMPHOCYTES # BLD AUTO: 1.76 10*3/MM3 (ref 0.7–3.1)
LYMPHOCYTES NFR BLD AUTO: 31.9 % (ref 19.6–45.3)
MCH RBC QN AUTO: 29.5 PG (ref 26.6–33)
MCHC RBC AUTO-ENTMCNC: 32.1 G/DL (ref 31.5–35.7)
MCV RBC AUTO: 91.9 FL (ref 79–97)
MONOCYTES # BLD AUTO: 0.54 10*3/MM3 (ref 0.1–0.9)
MONOCYTES NFR BLD AUTO: 9.8 % (ref 5–12)
NEUTROPHILS # BLD AUTO: 2.91 10*3/MM3 (ref 1.7–7)
NEUTROPHILS NFR BLD AUTO: 52.9 % (ref 42.7–76)
NRBC BLD AUTO-RTO: 0 /100 WBC (ref 0–0.2)
PLATELET # BLD AUTO: 238 10*3/MM3 (ref 140–450)
POTASSIUM SERPL-SCNC: 4.1 MMOL/L (ref 3.5–5.2)
PROT SERPL-MCNC: 7 G/DL (ref 6–8.5)
RBC # BLD AUTO: 4.95 10*6/MM3 (ref 3.77–5.28)
SODIUM SERPL-SCNC: 142 MMOL/L (ref 136–145)
TRIGL SERPL-MCNC: 98 MG/DL (ref 0–150)
VLDLC SERPL CALC-MCNC: 19.6 MG/DL
WBC # BLD AUTO: 5.51 10*3/MM3 (ref 3.4–10.8)

## 2019-05-24 ENCOUNTER — OFFICE VISIT (OUTPATIENT)
Dept: OBSTETRICS AND GYNECOLOGY | Age: 54
End: 2019-05-24

## 2019-05-24 VITALS
BODY MASS INDEX: 23.37 KG/M2 | HEIGHT: 62 IN | DIASTOLIC BLOOD PRESSURE: 74 MMHG | WEIGHT: 127 LBS | SYSTOLIC BLOOD PRESSURE: 112 MMHG

## 2019-05-24 DIAGNOSIS — Z11.51 SPECIAL SCREENING EXAMINATION FOR HUMAN PAPILLOMAVIRUS (HPV): ICD-10-CM

## 2019-05-24 DIAGNOSIS — Z12.4 ROUTINE CERVICAL SMEAR: Primary | ICD-10-CM

## 2019-05-24 PROCEDURE — 99396 PREV VISIT EST AGE 40-64: CPT | Performed by: OBSTETRICS & GYNECOLOGY

## 2019-05-24 NOTE — PROGRESS NOTES
"Subjective     Chief Complaint   Patient presents with   • Gynecologic Exam     AC       History of Present Illness    Carolin Kauffman is a 54 y.o.  who presents for annual exam.  The patient is a runner and is been staying active.  She was recently started on some blood pressure medicine but has not picked it up yet.  Blood pressure is normal here today.  Her mother had osteoporosis with vertebral fractures.  The patient is not taking any calcium or vitamin D supplementation.  No VB or pelvic pain   Obstetric History:  OB History      Para Term  AB Living    3 3 3          SAB TAB Ectopic Molar Multiple Live Births                        Menstrual History:     Patient's last menstrual period was 2017.         Current contraception: post menopausal status  History of abnormal Pap smear: no  Received Gardasil immunization: no  Perform regular self breast exam: yes  Family history of uterine or ovarian cancer: no  Family History of colon cancer: no  Family history of breast cancer: no    Mammogram: up to date.  Colonoscopy: up to date. Age 49 normal   DEXA: ordered.    Exercise: runner 4-5 per week   Calcium/Vitamin D: adequate intake    The following portions of the patient's history were reviewed and updated as appropriate: allergies, current medications, past family history, past medical history, past social history, past surgical history and problem list.    Review of Systems    Review of Systems   Constitutional: Negative for fatigue.   Respiratory: Negative for shortness of breath.    Gastrointestinal: Negative for abdominal pain.   Genitourinary: Negative for dysuria.   Neurological: Negative for headaches.   Psychiatric/Behavioral: Negative for dysphoric mood.         Objective   Physical Exam    /74   Ht 156.2 cm (61.5\")   Wt 57.6 kg (127 lb)   LMP 2017   BMI 23.61 kg/m²     General:   alert, appears stated age and cooperative   Neck: no adenopathy and thyroid normal " to palpation   Heart: regular rate and rhythm   Lungs: clear to auscultation bilaterally   Abdomen: soft, non-tender, without masses or organomegaly   Breast: inspection negative, no nipple discharge or bleeding, no masses or nodularity palpable   Vulva: normal, [place]tholin's, Urethra, [place]'s normal   Vagina: normal mucosa, normal discharge   Cervix: no cervical motion tenderness and no lesions   Uterus: mobile, non-tender, normal shape and consistency   Adnexa: no mass, fullness, tenderness   Rectal: normal rectal, no masses     Assessment/Plan   Carolin was seen today for gynecologic exam.    Diagnoses and all orders for this visit:    Routine cervical smear  -     IGP, Aptima HPV, Rfx 16 / 18,45    Special screening examination for human papillomavirus (HPV)  -     IGP, Aptima HPV, Rfx 16 / 18,45      I recommend that the patient have a bone density due to her low body weight and family history of osteoporosis.  We discussed calcium and vitamin D intake and handouts were given.  I will call the patient with her bone density results.  All questions answered.  Breast self exam technique reviewed and patient encouraged to perform self-exam monthly.  Discussed healthy lifestyle modifications.  Recommended 30 minutes of aerobic exercise five times per week.

## 2019-05-30 ENCOUNTER — TELEPHONE (OUTPATIENT)
Dept: OBSTETRICS AND GYNECOLOGY | Age: 54
End: 2019-05-30

## 2019-05-30 LAB
CYTOLOGIST CVX/VAG CYTO: NORMAL
CYTOLOGY CVX/VAG DOC CYTO: NORMAL
CYTOLOGY CVX/VAG DOC THIN PREP: NORMAL
DX ICD CODE: NORMAL
HIV 1 & 2 AB SER-IMP: NORMAL
HPV I/H RISK 4 DNA CVX QL PROBE+SIG AMP: NEGATIVE
OTHER STN SPEC: NORMAL
STAT OF ADQ CVX/VAG CYTO-IMP: NORMAL

## 2019-05-30 NOTE — TELEPHONE ENCOUNTER
----- Message from Bipin Donald MD sent at 5/30/2019  7:26 AM EDT -----  Please notify pap is normal.

## 2019-07-29 ENCOUNTER — PROCEDURE VISIT (OUTPATIENT)
Dept: OBSTETRICS AND GYNECOLOGY | Age: 54
End: 2019-07-29

## 2019-07-29 DIAGNOSIS — Z78.0 POST-MENOPAUSAL: Primary | ICD-10-CM

## 2019-07-29 PROCEDURE — 77080 DXA BONE DENSITY AXIAL: CPT | Performed by: OBSTETRICS & GYNECOLOGY

## 2019-07-31 RX ORDER — BUDESONIDE AND FORMOTEROL FUMARATE DIHYDRATE 160; 4.5 UG/1; UG/1
2 AEROSOL RESPIRATORY (INHALATION) 2 TIMES DAILY
Qty: 10.2 G | Refills: 6 | Status: SHIPPED | OUTPATIENT
Start: 2019-07-31 | End: 2020-06-09 | Stop reason: SDUPTHER

## 2019-11-18 RX ORDER — VALSARTAN 80 MG/1
80 TABLET ORAL DAILY
Qty: 30 TABLET | Refills: 2 | Status: SHIPPED | OUTPATIENT
Start: 2019-11-18 | End: 2020-01-16 | Stop reason: SDUPTHER

## 2020-01-16 ENCOUNTER — OFFICE VISIT (OUTPATIENT)
Dept: INTERNAL MEDICINE | Facility: CLINIC | Age: 55
End: 2020-01-16

## 2020-01-16 VITALS
RESPIRATION RATE: 16 BRPM | WEIGHT: 135.2 LBS | TEMPERATURE: 97.8 F | HEART RATE: 57 BPM | HEIGHT: 61 IN | BODY MASS INDEX: 25.53 KG/M2 | SYSTOLIC BLOOD PRESSURE: 142 MMHG | DIASTOLIC BLOOD PRESSURE: 78 MMHG | OXYGEN SATURATION: 98 %

## 2020-01-16 DIAGNOSIS — I10 ESSENTIAL HYPERTENSION: Primary | ICD-10-CM

## 2020-01-16 DIAGNOSIS — Z79.899 MEDICATION MANAGEMENT: ICD-10-CM

## 2020-01-16 DIAGNOSIS — E78.00 HIGH CHOLESTEROL: ICD-10-CM

## 2020-01-16 PROCEDURE — 99213 OFFICE O/P EST LOW 20 MIN: CPT | Performed by: INTERNAL MEDICINE

## 2020-01-16 RX ORDER — VALSARTAN 160 MG/1
80 TABLET ORAL DAILY
Qty: 90 TABLET | Refills: 1 | Status: SHIPPED | OUTPATIENT
Start: 2020-01-16 | End: 2020-02-07 | Stop reason: SDUPTHER

## 2020-01-16 NOTE — PROGRESS NOTES
Subjective   Carolin Kauffman is a 54 y.o. female.     Chief Complaint   Patient presents with   • Hypertension         Hypertension   This is a new problem. The current episode started more than 1 month ago. The problem is unchanged. Pertinent negatives include no chest pain, headaches, palpitations, peripheral edema or shortness of breath. Current antihypertension treatment includes angiotensin blockers. There are no compliance problems.  There is no history of angina, kidney disease, CAD/MI, CVA or heart failure.        The following portions of the patient's history were reviewed and updated as appropriate: allergies, current medications, past social history and problem list.    Outpatient Medications Marked as Taking for the 1/16/20 encounter (Office Visit) with J Carlos Dodson MD   Medication Sig Dispense Refill   • albuterol sulfate HFA (VENTOLIN HFA) 108 (90 Base) MCG/ACT inhaler Take 2 puffs by mouth 2 (Two) Times a Day.     • budesonide-formoterol (SYMBICORT) 160-4.5 MCG/ACT inhaler Inhale 2 puffs 2 (Two) Times a Day. 10.2 g 6   • valsartan (DIOVAN) 80 MG tablet Take 1 tablet by mouth Daily. 30 tablet 2       Review of Systems   Respiratory: Negative for shortness of breath.    Cardiovascular: Negative for chest pain and palpitations.   Neurological: Negative for headaches.       Objective   Vitals:    01/16/20 1106   BP: 142/78   Pulse: 57   Resp: 16   Temp: 97.8 °F (36.6 °C)   SpO2: 98%      Wt Readings from Last 3 Encounters:   01/16/20 61.3 kg (135 lb 3.2 oz)   05/24/19 57.6 kg (127 lb)   05/20/19 59.4 kg (131 lb)    Body mass index is 25.14 kg/m².      Physical Exam   Constitutional: She appears well-developed and well-nourished. No distress.   HENT:   Head: Normocephalic and atraumatic.   Neck: Carotid bruit is not present. No thyromegaly present.   Cardiovascular: Normal rate, regular rhythm, normal heart sounds and intact distal pulses. Exam reveals no gallop.   No murmur heard.  Pulmonary/Chest:  Effort normal and breath sounds normal. No respiratory distress. She has no wheezes. She has no rales.   Abdominal: Soft. Bowel sounds are normal. She exhibits no mass. There is no tenderness. There is no guarding.         Problem List Items Addressed This Visit        Cardiovascular and Mediastinum    Essential hypertension - Primary    High cholesterol        Assessment/Plan   In for recheck of hypertension.  Is very mild.  Is persistent.  She is on 80 mg of Diovan per day now without much benefit.  We will boost the dose up to 160 mg daily.  Due for annual preventive exam May 2020.  Annual lab work at that time will include CBC, CMP, lipids, UA.             Dragon disclaimer:   Much of this encounter note is an electronic transcription/translation of spoken language to printed text. The electronic translation of spoken language may permit erroneous, or at times, nonsensical words or phrases to be inadvertently transcribed; Although I have reviewed the note for such errors, some may still exist.

## 2020-02-07 RX ORDER — VALSARTAN 160 MG/1
160 TABLET ORAL DAILY
Qty: 90 TABLET | Refills: 1 | Status: SHIPPED | OUTPATIENT
Start: 2020-02-07 | End: 2020-08-21

## 2020-03-09 ENCOUNTER — TELEPHONE (OUTPATIENT)
Dept: OBSTETRICS AND GYNECOLOGY | Age: 55
End: 2020-03-09

## 2020-03-09 DIAGNOSIS — Z01.419 ENCOUNTER FOR GYNECOLOGICAL EXAMINATION WITHOUT ABNORMAL FINDING: Primary | ICD-10-CM

## 2020-03-09 NOTE — TELEPHONE ENCOUNTER
Patient will call centralized scheduling at [facility] to schedule her mammogram appointment.  Can Dr. Donald place orders?

## 2020-04-21 ENCOUNTER — APPOINTMENT (OUTPATIENT)
Dept: MAMMOGRAPHY | Facility: HOSPITAL | Age: 55
End: 2020-04-21

## 2020-06-09 ENCOUNTER — OFFICE VISIT (OUTPATIENT)
Dept: INTERNAL MEDICINE | Facility: CLINIC | Age: 55
End: 2020-06-09

## 2020-06-09 VITALS
RESPIRATION RATE: 14 BRPM | BODY MASS INDEX: 24.48 KG/M2 | SYSTOLIC BLOOD PRESSURE: 112 MMHG | DIASTOLIC BLOOD PRESSURE: 62 MMHG | OXYGEN SATURATION: 98 % | TEMPERATURE: 97.3 F | HEIGHT: 62 IN | WEIGHT: 133 LBS | HEART RATE: 62 BPM

## 2020-06-09 DIAGNOSIS — M85.89 OSTEOPENIA OF MULTIPLE SITES: ICD-10-CM

## 2020-06-09 DIAGNOSIS — Z00.00 WELLNESS EXAMINATION: Primary | ICD-10-CM

## 2020-06-09 DIAGNOSIS — Z13.21 ENCOUNTER FOR VITAMIN DEFICIENCY SCREENING: ICD-10-CM

## 2020-06-09 LAB
BILIRUB BLD-MCNC: NEGATIVE MG/DL
CLARITY, POC: CLEAR
COLOR UR: ABNORMAL
GLUCOSE UR STRIP-MCNC: NEGATIVE MG/DL
KETONES UR QL: NEGATIVE
LEUKOCYTE EST, POC: ABNORMAL
NITRITE UR-MCNC: NEGATIVE MG/ML
PH UR: 6.5 [PH] (ref 5–8)
PROT UR STRIP-MCNC: NEGATIVE MG/DL
RBC # UR STRIP: NEGATIVE /UL
SP GR UR: 1 (ref 1–1.03)
UROBILINOGEN UR QL: NORMAL

## 2020-06-09 PROCEDURE — 81003 URINALYSIS AUTO W/O SCOPE: CPT | Performed by: INTERNAL MEDICINE

## 2020-06-09 PROCEDURE — 99396 PREV VISIT EST AGE 40-64: CPT | Performed by: INTERNAL MEDICINE

## 2020-06-09 RX ORDER — BUDESONIDE AND FORMOTEROL FUMARATE DIHYDRATE 80; 4.5 UG/1; UG/1
2 AEROSOL RESPIRATORY (INHALATION) 2 TIMES DAILY
Qty: 1 INHALER | Refills: 11 | Status: SHIPPED | OUTPATIENT
Start: 2020-06-09 | End: 2020-12-21 | Stop reason: SDUPTHER

## 2020-06-09 NOTE — PROGRESS NOTES
Subjective   Carolin Kauffman is a 55 y.o. female.     Chief Complaint   Patient presents with   • Annual Exam         In for annual preventive exam.  Sleeps 6 to 7 hours per night.  Exercises 4-5 days/week.  Energy is good.  Diet is well-balanced.  She has lifelong asthma that is been under pretty good control with medication.    Hypertension   This is a new problem. The current episode started more than 1 year ago. The problem is unchanged. Pertinent negatives include no chest pain, headaches, palpitations or shortness of breath. There are no associated agents to hypertension. Risk factors for coronary artery disease include family history and post-menopausal state. Past treatments include nothing. There is no history of angina, kidney disease, CAD/MI, CVA or heart failure.        The following portions of the patient's history were reviewed and updated as appropriate: allergies, current medications, past social history and problem list.    Outpatient Medications Marked as Taking for the 6/9/20 encounter (Office Visit) with J Carlos Dodson MD   Medication Sig Dispense Refill   • albuterol sulfate HFA (VENTOLIN HFA) 108 (90 Base) MCG/ACT inhaler Take 2 puffs by mouth 2 (Two) Times a Day.     • budesonide-formoterol (SYMBICORT) 160-4.5 MCG/ACT inhaler Inhale 2 puffs 2 (Two) Times a Day. 10.2 g 6   • valsartan (DIOVAN) 160 MG tablet Take 1 tablet by mouth Daily. 90 tablet 1       Review of Systems   Constitutional: Negative for appetite change, chills, diaphoresis, fatigue, fever and unexpected weight change.   HENT: Negative for congestion, ear pain, hearing loss, nosebleeds, postnasal drip, rhinorrhea, sinus pressure and trouble swallowing.    Eyes: Negative for pain, itching and visual disturbance.   Respiratory: Positive for wheezing. Negative for cough, chest tightness and shortness of breath.    Cardiovascular: Negative for chest pain, palpitations and leg swelling.   Gastrointestinal: Negative for abdominal  pain, anal bleeding, constipation, diarrhea, nausea, rectal pain and vomiting.   Endocrine: Negative for cold intolerance, heat intolerance and polyuria.   Genitourinary: Negative for difficulty urinating, dysuria, flank pain, frequency, hematuria and urgency.   Musculoskeletal: Positive for arthralgias ( hands). Negative for back pain and myalgias.   Skin: Negative for rash.   Allergic/Immunologic: Negative for environmental allergies.   Neurological: Negative for dizziness, syncope, speech difficulty, weakness, light-headedness, numbness and headaches.   Hematological: Does not bruise/bleed easily.   Psychiatric/Behavioral: Negative for agitation, confusion and sleep disturbance. The patient is not nervous/anxious.        Objective   Vitals:    06/09/20 0833   BP: 112/62   Pulse: 62   Resp: 14   Temp: 97.3 °F (36.3 °C)   SpO2: 98%      Wt Readings from Last 3 Encounters:   06/09/20 60.3 kg (133 lb)   01/16/20 61.3 kg (135 lb 3.2 oz)   05/24/19 57.6 kg (127 lb)    Body mass index is 24.33 kg/m².      Physical Exam   Constitutional: She is oriented to person, place, and time. She appears well-developed and well-nourished.   HENT:   Head: Normocephalic and atraumatic.   Right Ear: External ear normal.   Left Ear: External ear normal.   Nose: Nose normal.   Mouth/Throat: Oropharynx is clear and moist.   Eyes: Pupils are equal, round, and reactive to light. Conjunctivae and EOM are normal.   Neck: Normal range of motion. Neck supple. No JVD present. No thyromegaly present.   Cardiovascular: Normal rate, regular rhythm, normal heart sounds and intact distal pulses. Exam reveals no gallop.   No murmur heard.  Pulmonary/Chest: Effort normal and breath sounds normal. No respiratory distress. She has no wheezes. She has no rales.   Abdominal: Soft. Bowel sounds are normal. She exhibits no distension and no mass. There is no tenderness. There is no guarding. No hernia.   Musculoskeletal: Normal range of motion. She exhibits  no edema.   Lymphadenopathy:     She has no cervical adenopathy.   Neurological: She is alert and oriented to person, place, and time. She displays normal reflexes. No cranial nerve deficit. Coordination normal.   Skin: Skin is warm and dry.   Psychiatric: She has a normal mood and affect. Her behavior is normal. Judgment and thought content normal.   Nursing note and vitals reviewed.        Problem List Items Addressed This Visit     None      Visit Diagnoses     Wellness examination    -  Primary    Relevant Orders    POCT urinalysis dipstick, automated        Assessment/Plan   In for annual preventive exam.  Overall health is excellent.  She has a history of lifelong asthma that is under good control.  Cholesterol runs little high but she has an excellent HDL cholesterol.  Coronary risk is very low.  Blood pressure is under excellent control.  She is due for annual lab work today which will include CBC, CMP, lipids, UA.  Vitamin D level today.  She is due for Shingrix vaccine.  She worries about osteoporosis being on an inhaled steroid.  She is only taking 1 puff a day of Symbicort 160.  I think the risk is very high low at that dosage range.  Advised patient that although the concern is real it is unlikely to be of clinical significance for her.  We can always try an 80 mcg dose and since she is switch her Symbicort every other day I think that would be reasonable.  She has osteopenia on a recent DEXA scan and is now taking some additional calcium.  She is 1.5 hours post coffee with skim milk.  Follow-up preventive exam in 1 year.    Prevention counseling was performed today. The counseling performed was routine health maintenance topics.      The 10-year ASCVD risk score (Jun CISNEROS Jr., et al., 2013) is: 1.9%    Values used to calculate the score:      Age: 55 years      Sex: Female      Is Non- : No      Diabetic: No      Tobacco smoker: No      Systolic Blood Pressure: 112 mmHg      Is  BP treated: Yes      HDL Cholesterol: 85 mg/dL      Total Cholesterol: 254 mg/dL           Dragon disclaimer:   Much of this encounter note is an electronic transcription/translation of spoken language to printed text. The electronic translation of spoken language may permit erroneous, or at times, nonsensical words or phrases to be inadvertently transcribed; Although I have reviewed the note for such errors, some may still exist.

## 2020-06-10 ENCOUNTER — HOSPITAL ENCOUNTER (OUTPATIENT)
Dept: MAMMOGRAPHY | Facility: HOSPITAL | Age: 55
Discharge: HOME | End: 2020-06-10
Admitting: OBSTETRICS & GYNECOLOGY

## 2020-06-10 LAB
25(OH)D3+25(OH)D2 SERPL-MCNC: 26.1 NG/ML (ref 30–100)
ALBUMIN SERPL-MCNC: 4.3 G/DL (ref 3.5–5.2)
ALBUMIN/GLOB SERPL: 1.7 G/DL
ALP SERPL-CCNC: 81 U/L (ref 39–117)
ALT SERPL-CCNC: 20 U/L (ref 1–33)
AST SERPL-CCNC: 21 U/L (ref 1–32)
BASOPHILS # BLD AUTO: 0.04 10*3/MM3 (ref 0–0.2)
BASOPHILS NFR BLD AUTO: 0.8 % (ref 0–1.5)
BILIRUB SERPL-MCNC: 0.4 MG/DL (ref 0.2–1.2)
BUN SERPL-MCNC: 17 MG/DL (ref 6–20)
BUN/CREAT SERPL: 19.1 (ref 7–25)
CALCIUM SERPL-MCNC: 10.1 MG/DL (ref 8.6–10.5)
CHLORIDE SERPL-SCNC: 105 MMOL/L (ref 98–107)
CHOLEST SERPL-MCNC: 241 MG/DL (ref 0–200)
CHOLEST/HDLC SERPL: 3.39 {RATIO}
CO2 SERPL-SCNC: 25.3 MMOL/L (ref 22–29)
CREAT SERPL-MCNC: 0.89 MG/DL (ref 0.57–1)
EOSINOPHIL # BLD AUTO: 0.27 10*3/MM3 (ref 0–0.4)
EOSINOPHIL NFR BLD AUTO: 5.5 % (ref 0.3–6.2)
ERYTHROCYTE [DISTWIDTH] IN BLOOD BY AUTOMATED COUNT: 12.8 % (ref 12.3–15.4)
GLOBULIN SER CALC-MCNC: 2.5 GM/DL
GLUCOSE SERPL-MCNC: 104 MG/DL (ref 65–99)
HCT VFR BLD AUTO: 39.6 % (ref 34–46.6)
HDLC SERPL-MCNC: 71 MG/DL (ref 40–60)
HGB BLD-MCNC: 13.5 G/DL (ref 12–15.9)
IMM GRANULOCYTES # BLD AUTO: 0.02 10*3/MM3 (ref 0–0.05)
IMM GRANULOCYTES NFR BLD AUTO: 0.4 % (ref 0–0.5)
LDLC SERPL CALC-MCNC: 151 MG/DL (ref 0–100)
LYMPHOCYTES # BLD AUTO: 1.44 10*3/MM3 (ref 0.7–3.1)
LYMPHOCYTES NFR BLD AUTO: 29.1 % (ref 19.6–45.3)
MCH RBC QN AUTO: 30.8 PG (ref 26.6–33)
MCHC RBC AUTO-ENTMCNC: 34.1 G/DL (ref 31.5–35.7)
MCV RBC AUTO: 90.2 FL (ref 79–97)
MONOCYTES # BLD AUTO: 0.6 10*3/MM3 (ref 0.1–0.9)
MONOCYTES NFR BLD AUTO: 12.1 % (ref 5–12)
NEUTROPHILS # BLD AUTO: 2.57 10*3/MM3 (ref 1.7–7)
NEUTROPHILS NFR BLD AUTO: 52.1 % (ref 42.7–76)
NRBC BLD AUTO-RTO: 0 /100 WBC (ref 0–0.2)
PLATELET # BLD AUTO: 241 10*3/MM3 (ref 140–450)
POTASSIUM SERPL-SCNC: 4.5 MMOL/L (ref 3.5–5.2)
PROT SERPL-MCNC: 6.8 G/DL (ref 6–8.5)
RBC # BLD AUTO: 4.39 10*6/MM3 (ref 3.77–5.28)
SODIUM SERPL-SCNC: 139 MMOL/L (ref 136–145)
TRIGL SERPL-MCNC: 95 MG/DL (ref 0–150)
VLDLC SERPL CALC-MCNC: 19 MG/DL
WBC # BLD AUTO: 4.94 10*3/MM3 (ref 3.4–10.8)

## 2020-06-10 PROCEDURE — 77067 SCR MAMMO BI INCL CAD: CPT

## 2020-06-10 PROCEDURE — 77063 BREAST TOMOSYNTHESIS BI: CPT

## 2020-06-11 RX ORDER — ACETAMINOPHEN 160 MG
2000 TABLET,DISINTEGRATING ORAL DAILY
COMMUNITY

## 2020-06-16 ENCOUNTER — TELEPHONE (OUTPATIENT)
Dept: INTERNAL MEDICINE | Facility: CLINIC | Age: 55
End: 2020-06-16

## 2020-06-16 NOTE — TELEPHONE ENCOUNTER
Patient has been paying out of pocket for Symbicort. She will continue to pay out of pocket. If she changes her mind, she will let the office know.

## 2020-06-16 NOTE — TELEPHONE ENCOUNTER
I have not seen this before.  If I am reading it correctly, she needs to either be proven to benefit from the Symbicort where she also has to fail a second drug like Breo.  I suspect they want her to switch to Breo 1 puff daily.  1.  Refill x5.

## 2020-06-16 NOTE — TELEPHONE ENCOUNTER
[facility] has denied generic Symbicort due to not meeting criteria:   Has failed to achieve treatment  goals ( shortness of breath, FEV1)  on previous treatment with brand  Symbicort AND one of the  following: Advair Disk, Advair HFA,  Wixela inhub,  fluticasone/salmeterol, Breo Ellipta.  Please advise.

## 2020-07-15 ENCOUNTER — OFFICE VISIT (OUTPATIENT)
Dept: ORTHOPEDIC SURGERY | Facility: CLINIC | Age: 55
End: 2020-07-15

## 2020-07-15 VITALS — BODY MASS INDEX: 23.92 KG/M2 | WEIGHT: 130 LBS | HEIGHT: 62 IN | TEMPERATURE: 98 F

## 2020-07-15 DIAGNOSIS — M25.551 RIGHT HIP PAIN: Primary | ICD-10-CM

## 2020-07-15 DIAGNOSIS — M54.16 LUMBAR RADICULOPATHY: ICD-10-CM

## 2020-07-15 PROCEDURE — 73502 X-RAY EXAM HIP UNI 2-3 VIEWS: CPT | Performed by: NURSE PRACTITIONER

## 2020-07-15 PROCEDURE — 99203 OFFICE O/P NEW LOW 30 MIN: CPT | Performed by: NURSE PRACTITIONER

## 2020-07-15 NOTE — PROGRESS NOTES
"Patient Name: Carolin Kauffman   YOB: 1965  Referring Primary Care Physician: J Carlos Dodson MD  BMI: Body mass index is 23.78 kg/m².    Chief Complaint:    Chief Complaint   Patient presents with   • Right Hip - Pain, Initial Evaluation        HPI: Pt of MWM in the past and presents with hip and low back pain. 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 Pain has been lingering and got worse in May. Had her \"back go out\" in 2016 and has not had a MRI. Denies fall or injury. Pt is very active and reports that pain is keeping her from normal activities.    Carolin Kauffman is a 55 y.o. female who presents today for evaluation of   Chief Complaint   Patient presents with   • Right Hip - Pain, Initial Evaluation       This problem is new to this examiner.     Subjective   Medications:   Home Medications:  Current Outpatient Medications on File Prior to Visit   Medication Sig   • albuterol sulfate HFA (VENTOLIN HFA) 108 (90 Base) MCG/ACT inhaler Take 2 puffs by mouth 2 (Two) Times a Day.   • budesonide-formoterol (Symbicort) 80-4.5 MCG/ACT inhaler Inhale 2 puffs 2 (Two) Times a Day.   • Cholecalciferol (VITAMIN D3) 50 MCG (2000 UT) capsule Take 2,000 Units by mouth Daily.   • Ibuprofen (ADVIL PO) Take  by mouth As Needed.   • valsartan (DIOVAN) 160 MG tablet Take 1 tablet by mouth Daily.     No current facility-administered medications on file prior to visit.      Current Medications:  Scheduled Meds:  Continuous Infusions:  No current facility-administered medications for this visit.   PRN Meds:.    I have reviewed the patient's medical history in detail and updated the computerized patient record.  Review and summarization of old records includes:    Past Medical History:   Diagnosis Date   • Asthma    • Cataract two years ago-right eye   • Family history of coronary artery disease in mother 3/7/2017   • " High cholesterol    • History of medical problems currentely treating for ulnar nerve entrapment   • Hypertension    • Irregular menstrual bleeding    • Menometrorrhagia    • Ovarian cyst    • Ulnar nerve abnormality    • Visual impairment nearsightedness        Past Surgical History:   Procedure Laterality Date   • BREAST BIOPSY Left    • CATARACT EXTRACTION     •  SECTION     •  SECTION     •  SECTION     • COLONOSCOPY  2015    KATHY Parmar MD   • D&C HYSTEROSCOPY          Social History     Occupational History   • Occupation:    Tobacco Use   • Smoking status: Never Smoker   • Smokeless tobacco: Never Used   Substance and Sexual Activity   • Alcohol use: Yes     Frequency: 2-3 times a week     Drinks per session: 1 or 2     Binge frequency: Never     Comment: Caffeine use 3 cups daily   • Drug use: No   • Sexual activity: Yes     Partners: Male     Birth control/protection: Post-menopausal      Social History     Social History Narrative   • Not on file        Family History   Problem Relation Age of Onset   • Hypertension Mother    • Osteoporosis Mother    • Heart attack Mother 70        Second heart Attack 2017   • Stroke Mother    • Hyperlipidemia Mother    • Heart disease Mother    • Melanoma Maternal Grandmother    • Atrial fibrillation Father    • Prostate cancer Father    • Hearing loss Father    • Cancer Father         prostate   • No Known Problems Son    • No Known Problems Son    • No Known Problems Daughter        ROS: 14 point review of systems was performed and all other systems were reviewed and are negative except for documented findings in HPI and today's encounter.     Allergies:   Allergies   Allergen Reactions   • Peanut Oil Anaphylaxis     Palm oil wheezing and throat closure   • Palm Oil [Hydrogenated Palm Oil Glycerides] Rash     Constitutional:  Denies fever, shaking or chills   Eyes:  Denies change in visual acuity   HENT:  Denies  "nasal congestion or sore throat   Respiratory:  Denies cough or shortness of breath   Cardiovascular:  Denies chest pain or severe LE edema   GI:  Denies abdominal pain, nausea, vomiting, bloody stools or diarrhea   Musculoskeletal:  Numbness, tingling, pain, or loss of motor function only as noted above in history of present illness.  : Denies painful urination or hematuria  Integument:  Denies rash, lesion or ulceration   Neurologic:  Denies headache or focal weakness  Endocrine:  Denies lymphadenopathy  Psych:  Denies confusion or change in mental status   Hem:  Denies active bleeding    OBJECTIVE:  Physical Exam: 55 y.o. female  Wt Readings from Last 3 Encounters:   07/15/20 59 kg (130 lb)   06/09/20 60.3 kg (133 lb)   01/16/20 61.3 kg (135 lb 3.2 oz)     Ht Readings from Last 1 Encounters:   07/15/20 157.5 cm (62\")     Body mass index is 23.78 kg/m².  Vitals:    07/15/20 0904   Temp: 98 °F (36.7 °C)     Vital signs reviewed.     General Appearance:    Alert, cooperative, in no acute distress                  Eyes: conjunctiva clear  ENT: external ears and nose atraumatic  CV: no peripheral edema  Resp: normal respiratory effort  Skin: no rashes or wounds; normal turgor  Psych: mood and affect appropriate  Lymph: no nodes appreciated  Neuro: gross sensation intact  Vascular:  Palpable peripheral pulse in noted extremity  Musculoskeletal Extremities: Right hip no pain with internal rotation or over the greater trochanter, no groin pain, no greater trochanter bursa tenderness, diffuse tenderness to low back. Resisted straight leg elicits pain in low back, +PMS and skin is warm, dry and intact, calves are soft and NTTP. Lumbar spine has diffuse tenderness. Ambulates without any assistive devices.    Radiology:   Right hip 2 views no comparison done for pain reveal no acute fracture some joint space narrowing    Assessment:     ICD-10-CM ICD-9-CM   1. Right hip pain M25.551 719.45   2. Lumbar radiculopathy M54.16 " 724.4        Procedures   MRI to lumbar spine will be ordered after lengthy discussion and with pts symptoms will determine if she has pathology in her lumbar spine.     Plan: Biomechanics of pertinent body area discussed.  Risks, benefits, alternatives, comparisons, and complications of accepted medicines, injections, recommendations, surgical procedures, and therapies explained and education provided in laymen's terms. Natural history and expected course of this patient's diagnosis discussed along with evaluation of therapies. Questions answered. When appropriate I also discussed proper use of cane, walker, trekking poles.   EXERCISES:  Advice on benefits of, and types of regular/moderate exercise including biomechanical forces involved as it pertains to this complaint.  MEDICATIONS:  Prescription, OTC and Monitoring of Medications per orders to address ortho complaints; Evaluation and discussion of safety, precautions, side effects, and warnings given especially of long term NSAID or steroid therapy.    RICE: Rest, ice, compression, and elevation therapy, Cryotherapy/brachy therapy, and or OTC linaments as indicated with instructions.   MRI.      7/15/2020    Much of this encounter note is an electronic transcription/translation of spoken language to printed text. The electronic translation of spoken language may permit erroneous, or at times, nonsensical words or phrases to be inadvertently transcribed; Although I have reviewed the note for such errors, some may still exist

## 2020-07-29 ENCOUNTER — TELEPHONE (OUTPATIENT)
Dept: ORTHOPEDIC SURGERY | Facility: CLINIC | Age: 55
End: 2020-07-29

## 2020-07-29 ENCOUNTER — HOSPITAL ENCOUNTER (OUTPATIENT)
Dept: MRI IMAGING | Facility: HOSPITAL | Age: 55
Discharge: HOME | End: 2020-07-29
Admitting: NURSE PRACTITIONER

## 2020-07-29 DIAGNOSIS — M54.16 LUMBAR RADICULOPATHY: ICD-10-CM

## 2020-07-29 DIAGNOSIS — M54.16 LUMBAR RADICULOPATHY: Primary | ICD-10-CM

## 2020-07-29 DIAGNOSIS — M25.551 RIGHT HIP PAIN: ICD-10-CM

## 2020-07-29 PROCEDURE — 72148 MRI LUMBAR SPINE W/O DYE: CPT

## 2020-07-29 NOTE — TELEPHONE ENCOUNTER
----- Message from WILBERT Loya sent at 7/29/2020 10:30 AM EDT -----  Pt has some degenerative changes in her spine with a spondylolithesis. I would suggest she follow with [facility] for evaluation and treatment options. She is an avid runner. See how she is doing may benefit from physical therapy.  IMPRESSION:     Mild dextroconvex curvature of the lumbar spine is noted with its apex  centered at L2-L3. There is grade 1 degenerative anterior  spondylolisthesis of L4 on L5 by approximately 6 mm.     There is mild bilateral foraminal narrowing at the L4-L5 level secondary  to disc bulging and facet hypertrophy.     No significant canal or foraminal narrowing is noted throughout the  remaining lumbar spine.     This report was finalized on 7/29/2020 10:10 AM by Dr. Stephon Gant M.D.

## 2020-07-29 NOTE — TELEPHONE ENCOUNTER
Patient called back and was informed of her results and options.  She wants to try PT at [facility] and will follow up with TROY.  She is aware someone will call her to set up the appt with him.

## 2020-08-18 ENCOUNTER — OFFICE VISIT (OUTPATIENT)
Dept: ORTHOPEDIC SURGERY | Facility: CLINIC | Age: 55
End: 2020-08-18

## 2020-08-18 VITALS — BODY MASS INDEX: 24.48 KG/M2 | HEIGHT: 62 IN | TEMPERATURE: 96.7 F | WEIGHT: 133 LBS

## 2020-08-18 DIAGNOSIS — M54.50 LUMBAR SPINE PAIN: Primary | ICD-10-CM

## 2020-08-18 DIAGNOSIS — M43.16 SPONDYLOLISTHESIS OF LUMBAR REGION: ICD-10-CM

## 2020-08-18 PROCEDURE — 99212 OFFICE O/P EST SF 10 MIN: CPT | Performed by: ORTHOPAEDIC SURGERY

## 2020-08-18 PROCEDURE — 72100 X-RAY EXAM L-S SPINE 2/3 VWS: CPT | Performed by: ORTHOPAEDIC SURGERY

## 2020-08-18 NOTE — PROGRESS NOTES
New patient or new problem visit    CC: Right hip pain    HPI: She describes right buttock pain radiating sometimes into the right leg as well as some low back pain predominantly the former.  Pain is improving at this point but was moderate and associated with running of which she does up to 20 miles per week.    PFSH: See attached    ROS: No bowel or bladder complaints no numbness tingling weakness    PE: Constitutional: Vital signs above-noted.  Awake, alert and oriented    Psychiatric: Affect and insight do not appear grossly disturbed.    Pulmonary: Breathing is unlabored, color is good.    Skin: Warm, dry and normal turgor    Cardiac: Pedal pulses intact.  No edema.    Eyesight and hearing appear adequate for examination purposes      Musculoskeletal:  There is no tenderness to percussion and palpation of the spine. Motion appears undisturbed.  Posture is unremarkable to coronal and sagittal inspection.    The skin about the area is not inspected.  There is no palpable or visible deformity.  There is no local spasm.  Figure-of-four test is negative.       Neurologic:   Reflexes are 2+ and symmetrical in the patellae and achilles.   Motor function is undisturbed in quadriceps, EHL, and gastrocnemius   sensation appears symmetrically intact to light touch.  In the bilateral lower extremities there is no evidence of atrophy.   Clonus is absent..  Gait appears undisturbed.  SLR test negative    XRAY: Plain film x-rays show very slight well-balanced scoliosis and L4-5 spondylolisthesis which measures 7 mm.  MRI does not show much in way of stenosis but the spondylolisthesis measures 2 or 3 mm so there is component of relative instability.    Other: n/a    Impression: L4-5 spondylolisthesis with spinal stenosis is the likely source of her radiating hip pain which is now improving    Plan: I think long-term cardiovascular exercise is helping her and she should continue to run with a moderate schedule as at present.   She will also do physical therapy for the back and I will see her back as needed

## 2020-08-21 RX ORDER — VALSARTAN 160 MG/1
TABLET ORAL
Qty: 90 TABLET | Refills: 3 | Status: SHIPPED | OUTPATIENT
Start: 2020-08-21 | End: 2021-08-27

## 2020-09-11 ENCOUNTER — OFFICE VISIT (OUTPATIENT)
Dept: OBSTETRICS AND GYNECOLOGY | Age: 55
End: 2020-09-11

## 2020-09-11 VITALS
DIASTOLIC BLOOD PRESSURE: 64 MMHG | WEIGHT: 131 LBS | SYSTOLIC BLOOD PRESSURE: 108 MMHG | BODY MASS INDEX: 24.11 KG/M2 | HEIGHT: 62 IN

## 2020-09-11 DIAGNOSIS — Z01.419 ENCOUNTER FOR GYNECOLOGICAL EXAMINATION WITHOUT ABNORMAL FINDING: ICD-10-CM

## 2020-09-11 DIAGNOSIS — M85.89 OSTEOPENIA OF MULTIPLE SITES: Primary | ICD-10-CM

## 2020-09-11 PROCEDURE — 99396 PREV VISIT EST AGE 40-64: CPT | Performed by: OBSTETRICS & GYNECOLOGY

## 2020-09-11 NOTE — PROGRESS NOTES
Subjective     Chief Complaint   Patient presents with   • Gynecologic Exam     AC. Last pap 19 normal. Pt had MG 6-.        History of Present Illness    Carolin Kauffman is a 55 y.o.  who presents for annual exam.  The patient has had some back problems.  She is still trying to run for exercise.  No vaginal bleeding or pelvic pain.  Patient's bone density last year showed osteopenia at the hip but her FRAX was normal.  She does have a family history of osteoporosis.  Her mother had a vertebral fracture around age 60.  Patient does get adequate vitamin D and calcium.  She is not having any discomfort from vaginal atrophy.     Obstetric History:  OB History        3    Para   3    Term   3            AB        Living           SAB        TAB        Ectopic        Molar        Multiple        Live Births                   Menstrual History:     Patient's last menstrual period was 2017.         Current contraception: post menopausal status  History of abnormal Pap smear: no  Received Gardasil immunization: no  Perform regular self breast exam : no  Family history of uterine or ovarian cancer: no  Family History of colon cancer: no  Family history of breast cancer: no    Mammogram: up to date.  Colonoscopy: up to date.   DEXA: up to date. Osteopenia 2019 Frax normal, mom compression fractures     Exercise: runner   Calcium/Vitamin D: adequate intake and uses supplements    The following portions of the patient's history were reviewed and updated as appropriate: allergies, current medications, past family history, past medical history, past social history, past surgical history and problem list.    Review of Systems    Review of Systems   Constitutional: Negative for fatigue.   Respiratory: Negative for shortness of breath.    Gastrointestinal: Negative for abdominal pain.   Genitourinary: Negative for dysuria.   Neurological: Negative for headaches.   Psychiatric/Behavioral:  "Negative for dysphoric mood.         Objective   Physical Exam    /64   Ht 157.5 cm (62\")   Wt 59.4 kg (131 lb)   LMP 08/01/2017   Breastfeeding No   BMI 23.96 kg/m²     General:   alert, appears stated age and cooperative   Neck: thyroid normal to palpation   Heart: regular rate and rhythm   Lungs: clear to auscultation bilaterally   Abdomen: soft, non-tender, without masses or organomegaly   Breast: inspection negative, no nipple discharge or bleeding, no masses or nodularity palpable   Vulva: normal, Bartholin's, Urethra, Layton's normal   Vagina:  Vaginal atrophy and pallor are noted   Cervix: no cervical motion tenderness and no lesions   Uterus: non-tender, normal shape and consistency   Adnexa: no mass, fullness, tenderness   Rectal: normal rectal, no masses     Assessment/Plan   Carolin was seen today for gynecologic exam.    Diagnoses and all orders for this visit:    Osteopenia of multiple sites    Encounter for gynecological examination without abnormal finding    Patient is doing well overall.  Her blood pressure and weight are good today.  She will continue running.  We discussed adding yoga to help with spine flexibility.  She will follow-up here in 1 year.    Pap smear was normal last year.  We will plan to repeat in 1 to 2 years.  Repeat bone density in 1 year.    All questions answered.  Breast self exam technique reviewed and patient encouraged to perform self-exam monthly.  Discussed healthy lifestyle modifications.  Recommended 30 minutes of aerobic exercise five times per week.  Discussed calcium needs to prevent osteoporosis.                 "

## 2020-12-21 RX ORDER — ALBUTEROL SULFATE 90 UG/1
2 AEROSOL, METERED RESPIRATORY (INHALATION) 2 TIMES DAILY
Qty: 18 G | Refills: 5 | Status: SHIPPED | OUTPATIENT
Start: 2020-12-21 | End: 2022-05-31

## 2020-12-21 RX ORDER — BUDESONIDE AND FORMOTEROL FUMARATE DIHYDRATE 160; 4.5 UG/1; UG/1
AEROSOL RESPIRATORY (INHALATION)
Qty: 10.2 G | Refills: 5 | Status: SHIPPED | OUTPATIENT
Start: 2020-12-21 | End: 2022-05-31

## 2021-02-13 ENCOUNTER — HOSPITAL ENCOUNTER (EMERGENCY)
Facility: HOSPITAL | Age: 56
Discharge: HOME | End: 2021-02-13
Attending: EMERGENCY MEDICINE | Admitting: EMERGENCY MEDICINE

## 2021-02-13 ENCOUNTER — APPOINTMENT (OUTPATIENT)
Dept: CT IMAGING | Facility: HOSPITAL | Age: 56
End: 2021-02-13

## 2021-02-13 VITALS
HEART RATE: 70 BPM | HEIGHT: 62 IN | WEIGHT: 132 LBS | DIASTOLIC BLOOD PRESSURE: 91 MMHG | RESPIRATION RATE: 16 BRPM | TEMPERATURE: 97.5 F | OXYGEN SATURATION: 100 % | BODY MASS INDEX: 24.29 KG/M2 | SYSTOLIC BLOOD PRESSURE: 153 MMHG

## 2021-02-13 DIAGNOSIS — S01.01XA OCCIPITAL SCALP LACERATION, INITIAL ENCOUNTER: Primary | ICD-10-CM
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
## 2021-02-13 PROCEDURE — 99283 EMERGENCY DEPT VISIT LOW MDM: CPT

## 2021-02-13 PROCEDURE — 25010000002 TDAP 5-2.5-18.5 LF-MCG/0.5 SUSPENSION: Performed by: NURSE PRACTITIONER

## 2021-02-13 PROCEDURE — 70450 CT HEAD/BRAIN W/O DYE: CPT

## 2021-02-13 PROCEDURE — 90715 TDAP VACCINE 7 YRS/> IM: CPT | Performed by: NURSE PRACTITIONER

## 2021-02-13 PROCEDURE — 90471 IMMUNIZATION ADMIN: CPT | Performed by: NURSE PRACTITIONER

## 2021-02-13 RX ORDER — ACETAMINOPHEN 500 MG
1000 TABLET ORAL ONCE
Status: COMPLETED | OUTPATIENT
Start: 2021-02-13 | End: 2021-02-13

## 2021-02-13 RX ADMIN — TETANUS TOXOID, REDUCED DIPHTHERIA TOXOID AND ACELLULAR PERTUSSIS VACCINE, ADSORBED 0.5 ML: 5; 2.5; 8; 8; 2.5 SUSPENSION INTRAMUSCULAR at 20:15

## 2021-02-13 RX ADMIN — ACETAMINOPHEN 1000 MG: 500 TABLET, FILM COATED ORAL at 20:02

## 2021-02-13 NOTE — ED TRIAGE NOTES
All triage performed with this RN wearing appropriate PPE.  Pt placed in mask upon arrival to ED.  Patient slipped and fell on the ice hitting her head on a rock. She denies LOC or taking blood thinners. She has a laceration on the back of her head.

## 2021-02-14 NOTE — ED PROVIDER NOTES
EMERGENCY DEPARTMENT ENCOUNTER    Room Number:  08/08  Date seen:  2/13/2021  Time seen: 19:46 EST  PCP: J Carlos Dodson MD  Historian: patient  HPI:  Chief complaint: Scalp laceration  A complete HPI/ROS/PMH/PSH/SH/FH are unobtainable due to: Not applicable  Context:Carolin Kauffman is a 56 y.o. female who presents to the ED with c/o small occipital scalp plaque sustained after slipping on ice and hitting her head on a rock.  She states she was strongly urged by her adult children to come to the emergency room for evaluation.  She does have a mild headache and states that the headache is not made better or worse by anything.  She has no additional injuries specifically hand or wrist injuries and has no complaints of back pain.  She denies LOC and is not on any blood thinners.  She is unsure of her tetanus status.    Patient was placed in face mask in first look. Patient was wearing facemask when I entered the room and throughout our encounter. I wore full protective equipment throughout this patient encounter including a face mask, eye shield and gloves. Hand hygiene/washing of hands was performed before donning protective equipment and after removal when leaving the room.    MEDICAL RECORD REVIEW    ALLERGIES  Peanut oil and Palm oil [hydrogenated palm oil glycerides]    PAST MEDICAL HISTORY  Active Ambulatory Problems     Diagnosis Date Noted   • Lumbar radiculopathy 05/16/2016   • Asthma 03/07/2017   • High cholesterol 03/07/2017   • Essential hypertension 03/07/2017   • Family history of coronary artery disease in mother 03/07/2017   • Osteopenia of multiple sites 06/09/2020     Resolved Ambulatory Problems     Diagnosis Date Noted   • No Resolved Ambulatory Problems     Past Medical History:   Diagnosis Date   • Cataract two years ago-right eye   • History of medical problems currentely treating for ulnar nerve entrapment   • Hypertension    • Irregular menstrual bleeding    • Menometrorrhagia    • Ovarian cyst     • Ulnar nerve abnormality    • Visual impairment nearsightedness       PAST SURGICAL HISTORY  Past Surgical History:   Procedure Laterality Date   • BREAST BIOPSY Left    • CATARACT EXTRACTION     •  SECTION     •  SECTION     •  SECTION     • COLONOSCOPY  2015    KATHY Parmar MD   • D&C HYSTEROSCOPY         FAMILY HISTORY  Family History   Problem Relation Age of Onset   • Hypertension Mother    • Osteoporosis Mother    • Heart attack Mother 70        Second heart Attack 2017   • Stroke Mother    • Hyperlipidemia Mother    • Heart disease Mother    • Melanoma Maternal Grandmother    • Atrial fibrillation Father    • Prostate cancer Father    • Hearing loss Father    • Cancer Father         prostate   • No Known Problems Son    • No Known Problems Son    • No Known Problems Daughter        SOCIAL HISTORY  Social History     Socioeconomic History   • Marital status:      Spouse name: Not on file   • Number of children: Not on file   • Years of education: Not on file   • Highest education level: Not on file   Occupational History   • Occupation:    Tobacco Use   • Smoking status: Never Smoker   • Smokeless tobacco: Never Used   Substance and Sexual Activity   • Alcohol use: Yes     Alcohol/week: 2.0 standard drinks     Types: 2 Glasses of wine per week     Frequency: 2-3 times a week     Drinks per session: 1 or 2     Binge frequency: Never     Comment: Caffeine use 3 cups daily   • Drug use: No   • Sexual activity: Yes     Partners: Male     Birth control/protection: Post-menopausal       REVIEW OF SYSTEMS  Review of Systems    All systems reviewed and negative except for those discussed in HPI.     PHYSICAL EXAM    ED Triage Vitals   Temp Heart Rate Resp BP SpO2   21 1809 21 1809 21 1809 21 1818 21 1809   97.5 °F (36.4 °C) 70 16 171/97 90 %      Temp src Heart Rate Source Patient Position BP Location FiO2 (%)   21  1809 02/13/21 1809 02/13/21 1819 02/13/21 1819 --   Tympanic Monitor Sitting Right arm      Physical Exam   HENT:   Head: Normocephalic. Head is with abrasion, with contusion and with laceration.           I have reviewed the triage vital signs and nursing notes.      GENERAL: not distressed  HENT: nares patent, mm moist, see diagram for scalp laceration  EYES: no scleral icterus, PERRL, EOMI  NECK: no ROM limitations  CV: regular rhythm, rate  normal  RESPIRATORY: normal effort,   ABDOMEN: soft  : deferred  MUSCULOSKELETAL: no deformity, no thoracic or lumbar discomfort, no bilateral hand or wrist pain  NEURO: alert, moves all extremities, follows commands  SKIN: warm, dry    Laceration Repair    Date/Time: 2/13/2021 8:15 PM  Performed by: Susana Pineda APRN  Authorized by: Aguilar Cam MD     Consent:     Consent obtained:  Verbal    Consent given by:  Patient    Risks discussed:  Pain and poor cosmetic result    Alternatives discussed:  No treatment  Anesthesia (see MAR for exact dosages):     Anesthesia method:  None  Laceration details:     Location:  Scalp    Scalp location:  Occipital    Length (cm):  0.5  Repair type:     Repair type:  Simple  Pre-procedure details:     Preparation:  Patient was prepped and draped in usual sterile fashion and imaging obtained to evaluate for foreign bodies  Exploration:     Wound exploration: entire depth of wound probed and visualized      Wound extent: no muscle damage noted, no nerve damage noted, no underlying fracture noted and no vascular damage noted      Contaminated: no    Treatment:     Area cleansed with:  Sada-Clens    Amount of cleaning:  Extensive    Irrigation solution:  Sterile saline    Irrigation volume:  50    Irrigation method:  Syringe    Visualized foreign bodies/material removed: no    Skin repair:     Repair method:  Staples    Number of staples:  1  Approximation:     Approximation:  Close  Post-procedure details:     Dressing:  Antibiotic  ointment    Patient tolerance of procedure:  Tolerated well, no immediate complications            RADIOLOGY RESULTS  CT Head Without Contrast   Final Result         Electronically signed by Beau Dyer M.D. on 02-13-21 at 2032            PROGRESS, DATA ANALYSIS, CONSULTS AND MEDICAL DECISION MAKING  All labs have been independently reviewed by me.  All radiology studies have been reviewed by me and discussed with radiologist dictating the report.  EKG's independently viewed and interpreted by me unless stated otherwise. Discussion below represents my analysis of pertinent findings related to patient's condition, differential diagnosis, treatment plan and final disposition.      DDX:slip and fall on ice, occipital scalp hematoma, scalp laceration      MDM: CT brain nonacute.  Patient's tetanus status was updated.  Headache was treated with Tylenol.  Laceration was stapled and wound care instructions provided to the patient.     Reviewed pt's history and workup with Dr. Dumont.  After a bedside evaluation, Dr. Dumont agrees with the plan of care.    The patient's history, physical exam, and lab findings were discussed with the physician, who also performed a face to face history and physical exam.  I discussed all results and noted any abnormalities with patient.  Discussed absoute need to recheck abnormalities with their family physician.  I answered any of the patient's questions.  Discussed plan for discharge, as there is no emergent indication for admission.  Pt is agreeable and understands need for follow up and repeat testing.  Pt is aware that discharge does not mean that nothing is wrong but it indicates no emergency is present and they must continue care with their family physician.  Pt is discharged with instructions to follow up with primary care doctor to have their blood pressure rechecked.           Disposition vitals:  /91   Pulse 70   Temp 97.5 °F (36.4 °C) (Tympanic)   Resp 16   Ht  "157.5 cm (62\")   Wt 59.9 kg (132 lb)   LMP 08/01/2017   SpO2 100%   BMI 24.14 kg/m²       DIAGNOSIS  Final diagnoses:   Fall due to ice or snow, initial encounter   Occipital scalp laceration, initial encounter       FOLLOW UP   J Carlos Dodson MD  [address] [id number] [phone]    Schedule an appointment as soon as possible for a visit in 8 days  For staple removal         Susana Pineda, APRN  02/13/21 2104    "

## 2021-02-14 NOTE — ED PROVIDER NOTES
MD ATTESTATION NOTE    The HONEY and I have discussed this patient's history, physical exam, and treatment plan.  I have reviewed the documentation and personally had a face to face interaction with the patient. I affirm the documentation and agree with the treatment and plan.  The attached note describes my personal findings.    Very nice 56-year-old female who slipped and fell earlier today and hit the back of her head.  She had no loss of consciousness, she is awake and alert with a GCS of 15 on exam.  She has no neck tenderness, and she has a small superficial laceration on the occipital scalp which will benefit from some repair.  The nurse practitioner will repair the wound and she should be cleared for discharge home pending normal CT scan.     Aguilar Cam MD  02/13/21 1955

## 2021-02-14 NOTE — ED NOTES
Pt verbalized understanding to f/u with her pcp and to return to the ER if symptoms are worse.      Roseanna Hernandez RN  02/13/21 2048

## 2021-02-14 NOTE — DISCHARGE INSTRUCTIONS
It is ok to wash hair tonight or tomorrow in shower, use gentle motions when cleansing around the area of swelling and staple    Staple to be removed in 8 days per Dr. Dodson office    Apply a thin layer of over the counter triple antibiotic ointment to the stapled area    Please expect some soreness and headache.    Take Tylenol, Ibuprofen or Naproxen over the counter to help with

## 2021-02-23 ENCOUNTER — OFFICE VISIT (OUTPATIENT)
Dept: INTERNAL MEDICINE | Facility: CLINIC | Age: 56
End: 2021-02-23

## 2021-02-23 VITALS
OXYGEN SATURATION: 98 % | HEIGHT: 62 IN | WEIGHT: 136 LBS | HEART RATE: 66 BPM | RESPIRATION RATE: 16 BRPM | BODY MASS INDEX: 25.03 KG/M2 | SYSTOLIC BLOOD PRESSURE: 112 MMHG | TEMPERATURE: 96.8 F | DIASTOLIC BLOOD PRESSURE: 68 MMHG

## 2021-02-23 DIAGNOSIS — Z48.02 ENCOUNTER FOR REMOVAL OF SUTURES: Primary | ICD-10-CM

## 2021-02-23 PROCEDURE — 99213 OFFICE O/P EST LOW 20 MIN: CPT | Performed by: INTERNAL MEDICINE

## 2021-02-23 NOTE — PROGRESS NOTES
Subjective   Carolin Kauffman is a 56 y.o. female.     Chief Complaint   Patient presents with   • Suture / Staple Removal     Head suture x 1         Suture / Staple Removal  The sutures were placed 7 to 10 days ago. She tried nothing since the wound repair. The treatment provided significant relief. There has been no drainage from the wound. There is no redness present. There is no swelling present. There is no pain present.        The following portions of the patient's history were reviewed and updated as appropriate: allergies, current medications, past social history and problem list.    Outpatient Medications Marked as Taking for the 2/23/21 encounter (Office Visit) with J Carlos Dodson MD   Medication Sig Dispense Refill   • albuterol sulfate HFA (Ventolin HFA) 108 (90 Base) MCG/ACT inhaler Inhale 2 puffs 2 (Two) Times a Day. 18 g 5   • Cholecalciferol (VITAMIN D3) 50 MCG (2000 UT) capsule Take 2,000 Units by mouth Daily.     • Ibuprofen (ADVIL PO) Take  by mouth As Needed.     • Symbicort 160-4.5 MCG/ACT inhaler INHALE TWO PUFFS BY MOUTH TWICE A DAY 10.2 g 5   • valsartan (DIOVAN) 160 MG tablet TAKE ONE TABLET BY MOUTH DAILY 90 tablet 3       Review of Systems   Skin: Positive for wound.       Objective   Vitals:    02/23/21 0931   BP: 112/68   Pulse: 66   Resp: 16   Temp: 96.8 °F (36 °C)   SpO2: 98%      Wt Readings from Last 3 Encounters:   02/23/21 61.7 kg (136 lb)   02/13/21 59.9 kg (132 lb)   09/11/20 59.4 kg (131 lb)    Body mass index is 24.87 kg/m².      Physical Exam  Constitutional:       Appearance: Normal appearance.   Skin:     General: Skin is warm and dry.      Findings: No erythema or rash.   Neurological:      Mental Status: She is alert.           Problems Addressed this Visit     None      Visit Diagnoses     Encounter for removal of sutures    -  Primary      Diagnoses       Codes Comments    Encounter for removal of sutures    -  Primary ICD-10-CM: Z48.02  ICD-9-CM: V58.32          Assessment/Plan   Patient suffered a fall 10 days ago.  Small laceration to the occiput.  Got a single staple placed.  The wound is well-healed.  The staple was removed today.  Tolerated it well.  No further treatment will be needed.  TD updated 10 days ago.    PPE today include the face mask and eye shield.             Dragon disclaimer:   Much of this encounter note is an electronic transcription/translation of spoken language to printed text. The electronic translation of spoken language may permit erroneous, or at times, nonsensical words or phrases to be inadvertently transcribed; Although I have reviewed the note for such errors, some may still exist.

## 2021-02-25 ENCOUNTER — OFFICE VISIT (OUTPATIENT)
Dept: ORTHOPEDIC SURGERY | Facility: CLINIC | Age: 56
End: 2021-02-25

## 2021-02-25 VITALS — TEMPERATURE: 97.6 F | HEIGHT: 62 IN | BODY MASS INDEX: 24.66 KG/M2 | WEIGHT: 134 LBS

## 2021-02-25 DIAGNOSIS — R52 PAIN: Primary | ICD-10-CM

## 2021-02-25 PROCEDURE — 99214 OFFICE O/P EST MOD 30 MIN: CPT | Performed by: ORTHOPAEDIC SURGERY

## 2021-02-25 PROCEDURE — 73630 X-RAY EXAM OF FOOT: CPT | Performed by: ORTHOPAEDIC SURGERY

## 2021-02-25 NOTE — PROGRESS NOTES
New Patient Complaint      Patient: Carolin Kauffman  YOB: 1965 56 y.o. female  Medical Record Number: [id number]    Chief Complaints: My heel hurts    History of Present Illness:   Patient reports an approximate 6-week history of mild but moderate with running pain in the plantar aspect of her left heel with throbbing pain if she steps on something barefoot and is aching.  Is worse with standing and improved with rest.  She has start up pain in the morning and symptoms improved some after walking and occurs after arising from a seated position.    She is used Advil intermittently which helps but only takes it about once a week after running.    I saw her in May 2016 for her right foot numbness and she now sees  for spinal stenosis.  Have also treated her in the past for plantar fasciitis on the right..       HPI    Allergies:   Allergies   Allergen Reactions   • Peanut Oil Anaphylaxis     Palm oil wheezing and throat closure   • Palm Oil [Hydrogenated Palm Oil Glycerides] Rash       Medications:   Current Outpatient Medications on File Prior to Visit   Medication Sig   • albuterol sulfate HFA (Ventolin HFA) 108 (90 Base) MCG/ACT inhaler Inhale 2 puffs 2 (Two) Times a Day.   • Cholecalciferol (VITAMIN D3) 50 MCG (2000 UT) capsule Take 2,000 Units by mouth Daily.   • Ibuprofen (ADVIL PO) Take  by mouth As Needed.   • Symbicort 160-4.5 MCG/ACT inhaler INHALE TWO PUFFS BY MOUTH TWICE A DAY   • valsartan (DIOVAN) 160 MG tablet TAKE ONE TABLET BY MOUTH DAILY     No current facility-administered medications on file prior to visit.        Past Medical History:   Diagnosis Date   • Asthma    • Cataract two years ago-right eye   • Family history of coronary artery disease in mother 3/7/2017   • High cholesterol    • History of medical problems currentely treating for ulnar nerve entrapment   • Hypertension    • Irregular menstrual bleeding    • Menometrorrhagia    • Ovarian cyst    • Ulnar nerve  "abnormality    • Visual impairment nearsightedness     Past Surgical History:   Procedure Laterality Date   • BREAST BIOPSY Left    • CATARACT EXTRACTION  2017   •  SECTION     •  SECTION     •  SECTION     • COLONOSCOPY  2015    KATHY Parmar MD   • D&C HYSTEROSCOPY       Social History     Occupational History   • Occupation:    Tobacco Use   • Smoking status: Never Smoker   • Smokeless tobacco: Never Used   Substance and Sexual Activity   • Alcohol use: Yes     Alcohol/week: 2.0 standard drinks     Types: 2 Glasses of wine per week     Frequency: 2-3 times a week     Drinks per session: 1 or 2     Binge frequency: Never     Comment: Caffeine use 3 cups daily   • Drug use: No   • Sexual activity: Yes     Partners: Male     Birth control/protection: Post-menopausal      Social History     Social History Narrative   • Not on file     Family History   Problem Relation Age of Onset   • Hypertension Mother    • Osteoporosis Mother    • Heart attack Mother 70        Second heart Attack 2017   • Stroke Mother    • Hyperlipidemia Mother    • Heart disease Mother    • Melanoma Maternal Grandmother    • Atrial fibrillation Father    • Prostate cancer Father    • Hearing loss Father    • Cancer Father         prostate   • No Known Problems Son    • No Known Problems Son    • No Known Problems Daughter        Review of Systems: 14 point review of systems performed, positive pertinent findings identified in HPI. All remaining systems negative     Review of Systems      Physical Exam:   Vitals:    21 1344   Temp: 97.6 °F (36.4 °C)   Weight: 60.8 kg (134 lb)   Height: 157.5 cm (62\")   PainSc:   1     Physical Exam   Constitutional: pleasant, well developed   Eyes: sclera non icteric  Hearing : adequate for exam  Cardiovascular: palpable pulses in left foot, left calf/ thigh NT without sign of DVT  Respiratoy: breathing unlabored   Neurological: grossly sensate to LT " throughout left LE  Psychiatric: oriented with normal mood and affect.   Lymphatic: No palpable popliteal lymphadenopathy left LE  Skin: intact throughout left leg/foot  Musculoskeletal: On exam she has moderate discomfort to palpation at the insertion of the left plantar fascia she has neutral dorsiflexion of the heel cord and really no significant exacerbation of pain with medial lateral calcaneal compression.  She was nontender along the Achilles and had a negative Tinel's along the tibial nerve.  Physical Exam  Ortho Exam    Radiology:  3 views left foot ordered evaluate pain reviewed and no prior x-rays available for comparison there is significant plantar calcaneal spurring and prominence to the superior calcaneal tuberosity but no intratendinous calcifications along the Achilles.  Small os perineum and some mild degenerative change of the first MTP joint.  Maybe some mild degenerative change at the midfoot    Assessment/Plan: Left heel pain consistent with exacerbation of chronic plantar fasciitis.      Plantar fascitis etiology and  treatment protocol discussed with pt. Information sheet provided and reviewed. This with consist of a night splint that was provided and discussed etiology of use. Towel stretch prior to first step in morning. Avoid barefoot ambulation. Gastroc and soleus heel cord stretch 4-5 times throughout the day, for 2-1/2 minutes of each session.  instruction sheet provided and demonstrated for patient. Use of shoes with arch support and ice bottle massage for 15-20 minutes each night. Counseled that improvement may take several months. Additional treatment modalities may include injection, therapy, or immobilization. I do not have anything to offer regarding surgical treatment at this time.    She is also prescribed Pennsaid to apply the area twice daily and avoid impact activity and stick to low impact exercise only.  Pt will follow up in 6 weeks but if she is not at all improved in 3  weeks to let me know we will get an MRI of her right hindfoot prior to follow-up.

## 2021-02-25 NOTE — PATIENT INSTRUCTIONS
[facility] - Orthopedics    J Carlos Harvey MD    PLANTAR FASCIITIS / HEEL PAIN    Plantar Fasciitis is a very common condition that affects people of all ages.  Frequent symptoms include heel pain that is worse upon arising in the morning or when arising from prolonged sitting.  Occasionally it feels as if there is a nail driven into your heel or there is burning pain about the heel.  These symptoms may be present for months prior to being seen in the doctor’s office.      Certain conditions may aggravate this heel pain.  These conditions include recent weight gain, a change in activity, such as increasing mileage with running, running on different surfaces, a change in exercise shoes, or worn out shoes.     The heel pain is caused by inflammation of the plantar fascia.  This fascia attaches to the heel and occasionally a bone spur is seen on x-ray.  This bone spur is only associated with the inflammation and is not the cause of the pain.         Treatment is directed at decreasing the inflammation and protecting the heel.  Specific treatments we may use are listed below.    1. Stretching the Achilles tendon, hamstrings, and plantar fascia  2. Ice applied for 20 minutes in the evening daily  3. Shoes with supportive heels and cushioned mid soles  4. Night splinting  5. Nonsteroidal anti-inflammatory medications  6. Heel padding or heel cups  7. Orthotic devices    Approximately 90% of patients will have resolution of their symptoms with this treatment.  Very rarely will an injection or surgery be necessary.  Plantar fasciitis is like bursitis of the shoulder and it may take up to one year to resolve.  Like bursitis, it may flair up again and require additional treatment.     After your initial visit, if you have not improved, we will see you in 6-8 weeks to reassess our therapy.     Do not be discouraged.  You will get better, but it may take time.

## 2021-02-26 RX ORDER — DICLOFENAC SODIUM 20 MG/G
1 SOLUTION TOPICAL 2 TIMES DAILY
Qty: 112 G | Refills: 12 | Status: SHIPPED | OUTPATIENT
Start: 2021-02-26 | End: 2021-11-14

## 2021-03-24 ENCOUNTER — BULK ORDERING (OUTPATIENT)
Dept: CASE MANAGEMENT | Facility: OTHER | Age: 56
End: 2021-03-24

## 2021-03-24 DIAGNOSIS — Z23 IMMUNIZATION DUE: ICD-10-CM

## 2021-07-16 ENCOUNTER — OFFICE VISIT (OUTPATIENT)
Dept: INTERNAL MEDICINE | Facility: CLINIC | Age: 56
End: 2021-07-16

## 2021-07-16 DIAGNOSIS — Z00.00 ENCOUNTER FOR PREVENTIVE HEALTH EXAMINATION: Primary | ICD-10-CM

## 2021-07-16 DIAGNOSIS — Z13.21 ENCOUNTER FOR VITAMIN DEFICIENCY SCREENING: ICD-10-CM

## 2021-07-16 DIAGNOSIS — Z01.89 LABORATORY PROCEDURE: ICD-10-CM

## 2021-07-17 LAB
25(OH)D3+25(OH)D2 SERPL-MCNC: 38.9 NG/ML (ref 30–100)
ALBUMIN SERPL-MCNC: 4.4 G/DL (ref 3.5–5.2)
ALBUMIN/GLOB SERPL: 2.1 G/DL
ALP SERPL-CCNC: 95 U/L (ref 39–117)
ALT SERPL-CCNC: 15 U/L (ref 1–33)
AST SERPL-CCNC: 17 U/L (ref 1–32)
BASOPHILS # BLD AUTO: 0.06 10*3/MM3 (ref 0–0.2)
BASOPHILS NFR BLD AUTO: 1.3 % (ref 0–1.5)
BILIRUB SERPL-MCNC: 0.3 MG/DL (ref 0–1.2)
BUN SERPL-MCNC: 20 MG/DL (ref 6–20)
BUN/CREAT SERPL: 24.7 (ref 7–25)
CALCIUM SERPL-MCNC: 9.7 MG/DL (ref 8.6–10.5)
CHLORIDE SERPL-SCNC: 105 MMOL/L (ref 98–107)
CHOLEST SERPL-MCNC: 267 MG/DL (ref 0–200)
CHOLEST/HDLC SERPL: 4.31 {RATIO}
CO2 SERPL-SCNC: 25.6 MMOL/L (ref 22–29)
CREAT SERPL-MCNC: 0.81 MG/DL (ref 0.57–1)
EOSINOPHIL # BLD AUTO: 0.33 10*3/MM3 (ref 0–0.4)
EOSINOPHIL NFR BLD AUTO: 7 % (ref 0.3–6.2)
ERYTHROCYTE [DISTWIDTH] IN BLOOD BY AUTOMATED COUNT: 13.1 % (ref 12.3–15.4)
GLOBULIN SER CALC-MCNC: 2.1 GM/DL
GLUCOSE SERPL-MCNC: 92 MG/DL (ref 65–99)
HCT VFR BLD AUTO: 43.6 % (ref 34–46.6)
HDLC SERPL-MCNC: 62 MG/DL (ref 40–60)
HGB BLD-MCNC: 13.9 G/DL (ref 12–15.9)
IMM GRANULOCYTES # BLD AUTO: 0.03 10*3/MM3 (ref 0–0.05)
IMM GRANULOCYTES NFR BLD AUTO: 0.6 % (ref 0–0.5)
LDLC SERPL CALC-MCNC: 188 MG/DL (ref 0–100)
LYMPHOCYTES # BLD AUTO: 1.49 10*3/MM3 (ref 0.7–3.1)
LYMPHOCYTES NFR BLD AUTO: 31.4 % (ref 19.6–45.3)
MCH RBC QN AUTO: 29.3 PG (ref 26.6–33)
MCHC RBC AUTO-ENTMCNC: 31.9 G/DL (ref 31.5–35.7)
MCV RBC AUTO: 91.8 FL (ref 79–97)
MONOCYTES # BLD AUTO: 0.51 10*3/MM3 (ref 0.1–0.9)
MONOCYTES NFR BLD AUTO: 10.8 % (ref 5–12)
NEUTROPHILS # BLD AUTO: 2.32 10*3/MM3 (ref 1.7–7)
NEUTROPHILS NFR BLD AUTO: 48.9 % (ref 42.7–76)
NRBC BLD AUTO-RTO: 0 /100 WBC (ref 0–0.2)
PLATELET # BLD AUTO: 219 10*3/MM3 (ref 140–450)
POTASSIUM SERPL-SCNC: 4.5 MMOL/L (ref 3.5–5.2)
PROT SERPL-MCNC: 6.5 G/DL (ref 6–8.5)
RBC # BLD AUTO: 4.75 10*6/MM3 (ref 3.77–5.28)
SODIUM SERPL-SCNC: 140 MMOL/L (ref 136–145)
TRIGL SERPL-MCNC: 97 MG/DL (ref 0–150)
VLDLC SERPL CALC-MCNC: 17 MG/DL (ref 5–40)
WBC # BLD AUTO: 4.74 10*3/MM3 (ref 3.4–10.8)

## 2021-07-20 ENCOUNTER — OFFICE VISIT (OUTPATIENT)
Dept: INTERNAL MEDICINE | Facility: CLINIC | Age: 56
End: 2021-07-20

## 2021-07-20 VITALS
BODY MASS INDEX: 25.03 KG/M2 | HEART RATE: 72 BPM | TEMPERATURE: 96.6 F | OXYGEN SATURATION: 99 % | DIASTOLIC BLOOD PRESSURE: 80 MMHG | RESPIRATION RATE: 18 BRPM | WEIGHT: 136 LBS | HEIGHT: 62 IN | SYSTOLIC BLOOD PRESSURE: 128 MMHG

## 2021-07-20 DIAGNOSIS — E78.00 HIGH CHOLESTEROL: ICD-10-CM

## 2021-07-20 DIAGNOSIS — I10 ESSENTIAL HYPERTENSION: ICD-10-CM

## 2021-07-20 DIAGNOSIS — Z00.00 ENCOUNTER FOR PREVENTIVE HEALTH EXAMINATION: Primary | ICD-10-CM

## 2021-07-20 DIAGNOSIS — Z13.21 ENCOUNTER FOR VITAMIN DEFICIENCY SCREENING: ICD-10-CM

## 2021-07-20 LAB
GLUCOSE UR STRIP-MCNC: NEGATIVE MG/DL
PH UR: 6 [PH] (ref 5–8)
PROT UR STRIP-MCNC: NEGATIVE MG/DL
RBC # UR STRIP: NEGATIVE /UL
SP GR UR: 1.02 (ref 1–1.03)

## 2021-07-20 PROCEDURE — 81003 URINALYSIS AUTO W/O SCOPE: CPT | Performed by: INTERNAL MEDICINE

## 2021-07-20 PROCEDURE — 90471 IMMUNIZATION ADMIN: CPT | Performed by: INTERNAL MEDICINE

## 2021-07-20 PROCEDURE — 99396 PREV VISIT EST AGE 40-64: CPT | Performed by: INTERNAL MEDICINE

## 2021-07-20 PROCEDURE — 90732 PPSV23 VACC 2 YRS+ SUBQ/IM: CPT | Performed by: INTERNAL MEDICINE

## 2021-07-20 NOTE — PROGRESS NOTES
Subjective   Carolin Kauffman is a 56 y.o. female.     Chief Complaint   Patient presents with   • Annual Exam         In for annual preventive exam.  Sleeps 6 to 7 hours per night.  Exercises 3-4 days/week.  Energy is good.  Diet is well-balanced.  She has lifelong asthma that is been under pretty good control with medication.    Hypertension  This is a new problem. The current episode started more than 1 year ago. The problem is unchanged. Pertinent negatives include no chest pain, headaches, palpitations or shortness of breath. There are no associated agents to hypertension. Risk factors for coronary artery disease include family history and post-menopausal state. Past treatments include nothing. There is no history of angina, kidney disease, CAD/MI, CVA or heart failure.        The following portions of the patient's history were reviewed and updated as appropriate: allergies, current medications, past social history and problem list.    Outpatient Medications Marked as Taking for the 7/20/21 encounter (Office Visit) with J Carlos Dodson MD   Medication Sig Dispense Refill   • albuterol sulfate HFA (Ventolin HFA) 108 (90 Base) MCG/ACT inhaler Inhale 2 puffs 2 (Two) Times a Day. 18 g 5   • Cholecalciferol (VITAMIN D3) 50 MCG (2000 UT) capsule Take 2,000 Units by mouth Daily.     • Diclofenac Sodium 2 % solution Apply 1 Pump topically 2 (Two) Times a Day. 112 g 12   • Ibuprofen (ADVIL PO) Take  by mouth As Needed.     • Symbicort 160-4.5 MCG/ACT inhaler INHALE TWO PUFFS BY MOUTH TWICE A DAY 10.2 g 5   • valsartan (DIOVAN) 160 MG tablet TAKE ONE TABLET BY MOUTH DAILY 90 tablet 3       Review of Systems   Constitutional: Negative for appetite change, chills, diaphoresis, fatigue, fever and unexpected weight change.   Respiratory: Positive for wheezing. Negative for cough, chest tightness and shortness of breath.    Cardiovascular: Negative for chest pain, palpitations and leg swelling.   Gastrointestinal: Negative  for abdominal pain, anal bleeding, blood in stool, constipation, diarrhea, nausea, rectal pain and vomiting.   Genitourinary: Negative for difficulty urinating, dysuria, flank pain, frequency, hematuria and urgency.   Musculoskeletal: Positive for arthralgias ( hands) and back pain. Negative for myalgias.   Allergic/Immunologic: Negative for environmental allergies.   Neurological: Negative for dizziness, syncope, speech difficulty, weakness, light-headedness, numbness and headaches.   Hematological: Does not bruise/bleed easily.   Psychiatric/Behavioral: Negative for agitation, confusion, dysphoric mood and sleep disturbance. The patient is not nervous/anxious.        Objective   Vitals:    07/20/21 1507   BP: 128/80   Pulse: 72   Resp: 18   Temp: 96.6 °F (35.9 °C)   SpO2: 99%      Wt Readings from Last 3 Encounters:   07/20/21 61.7 kg (136 lb)   02/25/21 60.8 kg (134 lb)   02/23/21 61.7 kg (136 lb)    Body mass index is 24.87 kg/m².      Physical Exam   Constitutional: She is oriented to person, place, and time. She appears well-developed.   HENT:   Head: Normocephalic and atraumatic.   Right Ear: External ear normal.   Left Ear: External ear normal.   Nose: Nose normal.   Eyes: Pupils are equal, round, and reactive to light. Conjunctivae are normal.   Neck: No JVD present. No thyromegaly present.   Cardiovascular: Normal rate, regular rhythm and normal heart sounds. Exam reveals no gallop.   No murmur heard.  Pulmonary/Chest: Effort normal and breath sounds normal. No respiratory distress. She has no wheezes. She has no rales.   Abdominal: Soft. Bowel sounds are normal. She exhibits no distension and no mass. There is no abdominal tenderness. There is no guarding. No hernia.   Musculoskeletal: Normal range of motion.   Lymphadenopathy:     She has no cervical adenopathy.   Neurological: She is alert and oriented to person, place, and time. She displays normal reflexes. No cranial nerve deficit. Coordination  normal.   Skin: Skin is warm and dry.   Psychiatric: Her behavior is normal. Judgment and thought content normal.   Nursing note and vitals reviewed.        Problems Addressed this Visit        Cardiac and Vasculature    High cholesterol    Essential hypertension      Other Visit Diagnoses     Encounter for preventive health examination    -  Primary    Relevant Orders    POC Urinalysis Dipstick, Automated (Completed)      Diagnoses       Codes Comments    Encounter for preventive health examination    -  Primary ICD-10-CM: Z00.00  ICD-9-CM: V70.0     Essential hypertension     ICD-10-CM: I10  ICD-9-CM: 401.9     High cholesterol     ICD-10-CM: E78.00  ICD-9-CM: 272.0         Assessment/Plan   In for annual preventive exam.  Overall health is excellent.  She has a history of lifelong asthma that is under good control.  Cholesterol runs little high but she has an excellent HDL cholesterol.  Coronary risk is very low.  Blood pressure is under excellent control.  She is due for annual lab work today which will include CBC, CMP, lipids, UA.  Vitamin D level today.  She is due for Shingrix vaccine.  She worries about osteoporosis being on an inhaled steroid.  She is only taking 1 puff a day of Symbicort 160.  I think the risk is very high low at that dosage range.  Advised patient that although the concern is real it is unlikely to be of clinical significance for her.  We can always try an 80 mcg dose and since she is switch her Symbicort every other day I think that would be reasonable.  She has osteopenia on a recent DEXA scan and is now taking some additional calcium.  Follow-up preventive exam in 1 year.  We will update Pneumovax today.      Prevention counseling was performed today. The counseling performed was routine health maintenance topics.    The above information was reviewed again today 07/20/21.  It continues to be accurate as reflected above and is unchanged.  History, physical and review of systems all  reviewed and are unchanged.  Medications were reviewed today and continue the current dosing.    PPE today includes face mask and eye shield.        The 10-year ASCVD risk score (Rhinelanderaristides CISNEROS Jr., et al., 2013) is: 3.9%    Values used to calculate the score:      Age: 56 years      Sex: Female      Is Non- : No      Diabetic: No      Tobacco smoker: No      Systolic Blood Pressure: 128 mmHg      Is BP treated: Yes      HDL Cholesterol: 62 mg/dL      Total Cholesterol: 267 mg/dL           Dragon disclaimer:   Much of this encounter note is an electronic transcription/translation of spoken language to printed text. The electronic translation of spoken language may permit erroneous, or at times, nonsensical words or phrases to be inadvertently transcribed; Although I have reviewed the note for such errors, some may still exist.

## 2021-08-27 RX ORDER — VALSARTAN 160 MG/1
TABLET ORAL
Qty: 90 TABLET | Refills: 3 | Status: SHIPPED | OUTPATIENT
Start: 2021-08-27 | End: 2022-08-23

## 2021-10-07 ENCOUNTER — TRANSCRIBE ORDERS (OUTPATIENT)
Dept: ADMINISTRATIVE | Facility: HOSPITAL | Age: 56
End: 2021-10-07

## 2021-10-07 DIAGNOSIS — Z12.31 VISIT FOR SCREENING MAMMOGRAM: Primary | ICD-10-CM

## 2021-10-12 ENCOUNTER — APPOINTMENT (OUTPATIENT)
Dept: MAMMOGRAPHY | Facility: HOSPITAL | Age: 56
End: 2021-10-12

## 2021-11-17 ENCOUNTER — TELEMEDICINE (OUTPATIENT)
Dept: INTERNAL MEDICINE | Facility: CLINIC | Age: 56
End: 2021-11-17

## 2021-11-17 DIAGNOSIS — J06.9 VIRAL UPPER RESPIRATORY TRACT INFECTION: Primary | ICD-10-CM

## 2021-11-17 PROCEDURE — 99213 OFFICE O/P EST LOW 20 MIN: CPT | Performed by: INTERNAL MEDICINE

## 2021-11-17 RX ORDER — AZITHROMYCIN 250 MG/1
TABLET, FILM COATED ORAL
Qty: 6 TABLET | Refills: 0 | Status: SHIPPED | OUTPATIENT
Start: 2021-11-17 | End: 2021-12-27

## 2021-11-17 NOTE — PROGRESS NOTES
Subjective   Carolin Kauffman is a 56 y.o. female.     Chief Complaint   Patient presents with   • Sore Throat   • Conjunctivitis   • Nasal Congestion         Sore Throat   This is a new problem. The current episode started 1 to 4 weeks ago. The problem has been unchanged. Pertinent negatives include no coughing.   Conjunctivitis   Associated symptoms include rhinorrhea, sore throat and eye redness. Pertinent negatives include no fever and no cough.        The following portions of the patient's history were reviewed and updated as appropriate: allergies, current medications, past social history and problem list.    Outpatient Medications Marked as Taking for the 11/17/21 encounter (Telemedicine) with J Carlos Dodson MD   Medication Sig Dispense Refill   • albuterol sulfate HFA (Ventolin HFA) 108 (90 Base) MCG/ACT inhaler Inhale 2 puffs 2 (Two) Times a Day. 18 g 5   • Chlorcyclizine-Pseudoephed (Stahist AD) 25-60 MG tablet Take 1 tablet by mouth Every 8 (Eight) Hours As Needed (congestion). 30 tablet 0   • Cholecalciferol (VITAMIN D3) 50 MCG (2000 UT) capsule Take 2,000 Units by mouth Daily.     • Ibuprofen (ADVIL PO) Take  by mouth As Needed.     • Symbicort 160-4.5 MCG/ACT inhaler INHALE TWO PUFFS BY MOUTH TWICE A DAY 10.2 g 5   • tobramycin 0.3 % solution ophthalmic solution Administer 2 drops into the left eye Every 6 (Six) Hours for 7 days. 5 mL 0   • valsartan (DIOVAN) 160 MG tablet TAKE ONE TABLET BY MOUTH DAILY 90 tablet 3       Review of Systems   Constitutional: Negative for chills and fever.   HENT: Positive for rhinorrhea and sore throat.    Eyes: Positive for redness.   Respiratory: Negative for cough.        Objective   There were no vitals filed for this visit.   Wt Readings from Last 3 Encounters:   11/14/21 59.9 kg (132 lb)   11/10/21 59.9 kg (132 lb)   07/20/21 61.7 kg (136 lb)    There is no height or weight on file to calculate BMI.      Physical Exam  Constitutional:       Appearance:  Normal appearance.   Pulmonary:      Effort: Pulmonary effort is normal.   Neurological:      Mental Status: She is alert.   Psychiatric:         Mood and Affect: Mood normal.         Behavior: Behavior normal.         Thought Content: Thought content normal.         Judgment: Judgment normal.           Problems Addressed this Visit     None      Visit Diagnoses     Viral upper respiratory tract infection    -  Primary      Diagnoses       Codes Comments    Viral upper respiratory tract infection    -  Primary ICD-10-CM: J06.9  ICD-9-CM: 465.9         Assessment/Plan   Began 9 days ago with sore throat.  2 days later she went to immediate care center where a rapid strep screen was negative.  Treated with Advil and Tylenol.  The throat has never improved.  Has developed some head congestion.  Scant cough.  About 4 days ago she developed pinkeye.  Left eye.  Went back to the immediate care center was given Tobramycin eyedrops.  Also given some Stahist for the sore throat.  She is maybe a little better.  She is just having lingering symptoms at this point.  She is doing everything she can at this point and not having much progress.  May be nonspecific pharyngitis involved.  We will begin a trial of Zithromax.  Spent 12 minutes with patient on the visit today.  [employer] platform used.    The above information was reviewed again today 11/17/21.  It continues to be accurate as reflected above and is unchanged.  History, physical and review of systems all reviewed and are unchanged.  Medications were reviewed today and continue the current dosing.    PPE today includes face mask and eye shield.             Dragon disclaimer:   Much of this encounter note is an electronic transcription/translation of spoken language to printed text. The electronic translation of spoken language may permit erroneous, or at times, nonsensical words or phrases to be inadvertently transcribed; Although I have reviewed the note for such errors,  some may still exist.

## 2021-12-02 ENCOUNTER — HOSPITAL ENCOUNTER (OUTPATIENT)
Dept: MAMMOGRAPHY | Facility: HOSPITAL | Age: 56
Discharge: HOME | End: 2021-12-02
Admitting: OBSTETRICS & GYNECOLOGY

## 2021-12-02 DIAGNOSIS — Z12.31 VISIT FOR SCREENING MAMMOGRAM: ICD-10-CM

## 2021-12-02 PROCEDURE — 77067 SCR MAMMO BI INCL CAD: CPT

## 2021-12-02 PROCEDURE — 77063 BREAST TOMOSYNTHESIS BI: CPT

## 2021-12-09 ENCOUNTER — TELEMEDICINE (OUTPATIENT)
Dept: INTERNAL MEDICINE | Facility: CLINIC | Age: 56
End: 2021-12-09

## 2021-12-09 DIAGNOSIS — J06.9 VIRAL UPPER RESPIRATORY TRACT INFECTION: Primary | ICD-10-CM

## 2021-12-09 PROCEDURE — 99213 OFFICE O/P EST LOW 20 MIN: CPT | Performed by: INTERNAL MEDICINE

## 2021-12-09 RX ORDER — CLOTRIMAZOLE 10 MG/1
10 LOZENGE ORAL; TOPICAL 4 TIMES DAILY
Qty: 35 TABLET | Refills: 1 | Status: SHIPPED | OUTPATIENT
Start: 2021-12-09 | End: 2021-12-27

## 2021-12-09 NOTE — PROGRESS NOTES
Subjective   Carolin Kauffman is a 56 y.o. female.     Chief Complaint   Patient presents with   • Sore Throat     yellow spots         Sore Throat   This is a new problem. The current episode started 1 to 4 weeks ago. The problem has been unchanged. There has been no fever. The patient is experiencing no pain.        The following portions of the patient's history were reviewed and updated as appropriate: allergies, current medications, past social history and problem list.    Outpatient Medications Marked as Taking for the 12/9/21 encounter (Telemedicine) with J Carlos Dodson MD   Medication Sig Dispense Refill   • albuterol sulfate HFA (Ventolin HFA) 108 (90 Base) MCG/ACT inhaler Inhale 2 puffs 2 (Two) Times a Day. 18 g 5   • Cholecalciferol (VITAMIN D3) 50 MCG (2000 UT) capsule Take 2,000 Units by mouth Daily.     • Symbicort 160-4.5 MCG/ACT inhaler INHALE TWO PUFFS BY MOUTH TWICE A DAY 10.2 g 5   • valsartan (DIOVAN) 160 MG tablet TAKE ONE TABLET BY MOUTH DAILY 90 tablet 3       Review of Systems   HENT: Positive for sore throat.        Objective   There were no vitals filed for this visit.   Wt Readings from Last 3 Encounters:   11/14/21 59.9 kg (132 lb)   11/10/21 59.9 kg (132 lb)   07/20/21 61.7 kg (136 lb)    There is no height or weight on file to calculate BMI.      Physical Exam  Constitutional:       Appearance: Normal appearance.   Pulmonary:      Effort: Pulmonary effort is normal.   Neurological:      Mental Status: She is alert.   Psychiatric:         Mood and Affect: Mood normal.         Behavior: Behavior normal.         Thought Content: Thought content normal.         Judgment: Judgment normal.           Problems Addressed this Visit     None      Visit Diagnoses     Viral upper respiratory tract infection    -  Primary      Diagnoses       Codes Comments    Viral upper respiratory tract infection    -  Primary ICD-10-CM: J06.9  ICD-9-CM: 465.9         Assessment/Plan   Video visit due to  Covid pandemic.  On about 11/8/2021 she began with sore throat and congestion.  Was seen at [facility] and had a negative Covid screen.  Was given decongestants.  We visited by video on 11/17/2021 and added a Z-Carlos.  She was better for perhaps 1 week or so.  About 9 days ago she began again with some throat soreness.  There is seem to be some bumps on the back of her throat which were there originally.  Actually her symptoms are resolved at this point other than the bumps on her throat.  The current symptoms are most suspicious for some reactive patches of lymph tissue in the throat.  It is highly unlikely she needs any treatment at this point.  Just observation.  Given her use of Symbicort will empirically place her on Mycelex troches 4 times daily for 7 to 10 days.  Spent 14 minutes with patient on the visit today.  [employer] platform used.    The above information was reviewed again today 12/09/21.  It continues to be accurate as reflected above and is unchanged.  History, physical and review of systems all reviewed and are unchanged.  Medications were reviewed today and continue the current dosing.             Chan disclaimer:   Much of this encounter note is an electronic transcription/translation of spoken language to printed text. The electronic translation of spoken language may permit erroneous, or at times, nonsensical words or phrases to be inadvertently transcribed; Although I have reviewed the note for such errors, some may still exist.

## 2021-12-27 ENCOUNTER — OFFICE VISIT (OUTPATIENT)
Dept: INTERNAL MEDICINE | Facility: CLINIC | Age: 56
End: 2021-12-27

## 2021-12-27 VITALS
OXYGEN SATURATION: 95 % | DIASTOLIC BLOOD PRESSURE: 70 MMHG | BODY MASS INDEX: 25.21 KG/M2 | SYSTOLIC BLOOD PRESSURE: 122 MMHG | RESPIRATION RATE: 18 BRPM | HEART RATE: 86 BPM | HEIGHT: 62 IN | WEIGHT: 137 LBS | TEMPERATURE: 97.7 F

## 2021-12-27 DIAGNOSIS — J02.9 PHARYNGITIS, UNSPECIFIED ETIOLOGY: Primary | ICD-10-CM

## 2021-12-27 PROCEDURE — 99213 OFFICE O/P EST LOW 20 MIN: CPT | Performed by: INTERNAL MEDICINE

## 2021-12-27 NOTE — PROGRESS NOTES
Subjective   Carolin Kauffman is a 56 y.o. female.     Chief Complaint   Patient presents with   • Spots on back of throat   • Sort Throat     11/9/21         Began about 2 months ago with a sore throat.  Was seen in [facility] care center and given nothing.  Strep testing was negative.  They did think her throat looked funny.  We did a video visit a month or so ago and gave her an antibiotic which seemed to help.  She called back a few weeks later and still thought her throat looked funny.  We wondered about yeast infection and gave her a round of Mycelex.  That did very little.  She really has little in the way of symptoms at this point.  Just a weird looking throat exam.       The following portions of the patient's history were reviewed and updated as appropriate: allergies, current medications, past social history and problem list.    Outpatient Medications Marked as Taking for the 12/27/21 encounter (Office Visit) with J Carlos Dodson MD   Medication Sig Dispense Refill   • albuterol sulfate HFA (Ventolin HFA) 108 (90 Base) MCG/ACT inhaler Inhale 2 puffs 2 (Two) Times a Day. 18 g 5   • Ibuprofen (ADVIL PO) Take  by mouth As Needed.     • Symbicort 160-4.5 MCG/ACT inhaler INHALE TWO PUFFS BY MOUTH TWICE A DAY 10.2 g 5   • valsartan (DIOVAN) 160 MG tablet TAKE ONE TABLET BY MOUTH DAILY 90 tablet 3       Review of Systems   Constitutional: Negative for chills and fever.   HENT: Positive for sore throat. Negative for postnasal drip and rhinorrhea.    Respiratory: Negative for cough.    Allergic/Immunologic: Positive for environmental allergies.       Objective   Vitals:    12/27/21 1108   BP: 122/70   Pulse: 86   Resp: 18   Temp: 97.7 °F (36.5 °C)   SpO2: 95%      Wt Readings from Last 3 Encounters:   12/27/21 62.1 kg (137 lb)   11/14/21 59.9 kg (132 lb)   11/10/21 59.9 kg (132 lb)    Body mass index is 25.47 kg/m².      Physical Exam  Constitutional:       Appearance: Normal appearance.   HENT:      Right  Ear: Tympanic membrane normal.      Left Ear: Tympanic membrane normal.      Mouth/Throat:      Mouth: Mucous membranes are moist.      Pharynx: Oropharynx is clear. No oropharyngeal exudate or posterior oropharyngeal erythema.   Neurological:      Mental Status: She is alert.           Problems Addressed this Visit     None      Visit Diagnoses     Pharyngitis, unspecified etiology    -  Primary      Diagnoses       Codes Comments    Pharyngitis, unspecified etiology    -  Primary ICD-10-CM: J02.9  ICD-9-CM: 462         Assessment/Plan   Developed a sore throat about 2 months ago.  Nonspecific pharyngitis.  The big concern right now is a persistent abnormal throat exam.  She feels pretty good at the present time.  Chronic allergies.  She has a small whitish discoloration that almost looks denuded on the left posterior pharynx.  2 or 3 little spots that look like hypertrophic lymph tissue nearby.  I do not think these are really pathologic at this point.  Patient reassured.  There is nothing going on from an infectious standpoint right now.  Taking the Mycelex was probably a good idea at the time given her chronic Symbicort use.  She is also been suffering more recently with some GERD.  That has not been a problem previously.  We discussed various dietary interventions as well as the as needed use of Pepcid.    The above information was reviewed again today 12/27/21.  It continues to be accurate as reflected above and is unchanged.  History, physical and review of systems all reviewed and are unchanged.  Medications were reviewed today and continue the current dosing.    PPE today includes face mask and eye shield.             Dragon disclaimer:   Much of this encounter note is an electronic transcription/translation of spoken language to printed text. The electronic translation of spoken language may permit erroneous, or at times, nonsensical words or phrases to be inadvertently transcribed; Although I have  reviewed the note for such errors, some may still exist.

## 2022-01-21 ENCOUNTER — PROCEDURE VISIT (OUTPATIENT)
Dept: OBSTETRICS AND GYNECOLOGY | Age: 57
End: 2022-01-21

## 2022-01-21 DIAGNOSIS — Z78.0 POST-MENOPAUSAL: Primary | ICD-10-CM

## 2022-01-21 PROCEDURE — 77063 BREAST TOMOSYNTHESIS BI: CPT | Performed by: OBSTETRICS & GYNECOLOGY

## 2022-01-21 PROCEDURE — 77080 DXA BONE DENSITY AXIAL: CPT | Performed by: OBSTETRICS & GYNECOLOGY

## 2022-01-21 PROCEDURE — 77067 SCR MAMMO BI INCL CAD: CPT | Performed by: OBSTETRICS & GYNECOLOGY

## 2022-03-03 ENCOUNTER — OFFICE VISIT (OUTPATIENT)
Dept: OBSTETRICS AND GYNECOLOGY | Age: 57
End: 2022-03-03

## 2022-03-03 VITALS
WEIGHT: 134 LBS | BODY MASS INDEX: 24.66 KG/M2 | DIASTOLIC BLOOD PRESSURE: 76 MMHG | HEIGHT: 62 IN | SYSTOLIC BLOOD PRESSURE: 118 MMHG

## 2022-03-03 DIAGNOSIS — Z12.4 SCREENING FOR MALIGNANT NEOPLASM OF THE CERVIX: Primary | ICD-10-CM

## 2022-03-03 DIAGNOSIS — M85.89 OSTEOPENIA OF MULTIPLE SITES: ICD-10-CM

## 2022-03-03 DIAGNOSIS — Z01.419 ENCOUNTER FOR GYNECOLOGICAL EXAMINATION WITHOUT ABNORMAL FINDING: ICD-10-CM

## 2022-03-03 DIAGNOSIS — Z11.51 SPECIAL SCREENING EXAMINATION FOR HUMAN PAPILLOMAVIRUS (HPV): ICD-10-CM

## 2022-03-03 PROCEDURE — 99396 PREV VISIT EST AGE 40-64: CPT | Performed by: OBSTETRICS & GYNECOLOGY

## 2022-03-03 NOTE — PROGRESS NOTES
"Subjective     Chief Complaint   Patient presents with   • Gynecologic Exam     AC, Pt had MG 2021.        History of Present Illness    Carolin Kauffman is a 57 y.o.  who presents for annual exam.  Patient has had some throat discomfort.  She is seen her primary care doctor.  She is exercising regularly.  No vaginal bleeding or pelvic pain.  Patient works as a .    Obstetric History:  OB History        3    Para   3    Term   3            AB        Living           SAB        IAB        Ectopic        Molar        Multiple        Live Births                   Menstrual History:     Patient's last menstrual period was 2017.           History of abnormal Pap smear: no  Received Gardasil immunization: no  Perform regular self breast exam : no   Family history of uterine or ovarian cancer: no  Family History of colon cancer: no  Family history of breast cancer: no    Mammogram: up to date.  Colonoscopy: up to date. Age 49 q 10 yrs   DEXA: up to date.  Osteopenia Frax OK     Exercise: exercises 4 times a week  Calcium/Vitamin D: adequate intake and uses supplements    The following portions of the patient's history were reviewed and updated as appropriate: allergies, current medications, past family history, past medical history, past social history, past surgical history and problem list.    Review of Systems    Review of Systems   Constitutional: Negative for fatigue.   Respiratory: Negative for shortness of breath.    Gastrointestinal: Negative for abdominal pain.   Genitourinary: Negative for dysuria.   Neurological: Negative for headaches.   Psychiatric/Behavioral: Negative for dysphoric mood.     Objective   Physical Exam    /76   Ht 156.2 cm (61.5\")   Wt 60.8 kg (134 lb)   LMP 2017   Breastfeeding No   BMI 24.91 kg/m²     General:   alert, appears stated age and cooperative   Neck: thyroid normal to palpation   Heart: regular rate and rhythm   Lungs: " clear to auscultation bilaterally   Abdomen: soft, non-tender, without masses or organomegaly   Breast: inspection negative, no nipple discharge or bleeding, no masses or nodularity palpable   Vulva: normal, Bartholin's, Urethra, Hortense's normal   Vagina: normal mucosa, normal discharge   Cervix: no cervical motion tenderness and no lesions   Uterus: non-tender, normal shape and consistency   Adnexa: no mass, fullness, tenderness   Rectal: normal rectal, no masses     Assessment/Plan   Diagnoses and all orders for this visit:    1. Screening for malignant neoplasm of the cervix (Primary)  -     IGP, Aptima HPV, Rfx 16 / 18,45    2. Special screening examination for human papillomavirus (HPV)  -     IGP, Aptima HPV, Rfx 16 / 18,45    3. Osteopenia of multiple sites    4. Encounter for gynecological examination without abnormal finding      For throat discomfort recommend patient see Dr. Bullock  Pap was done today  We reviewed the patient's recent DEXA which shows osteopenia with normal FRAX.  Repeat bone density in 2024  Patient's cancer screening was positive due to 3 prostate cancers in the family.  Patient declines genetic testing.    All questions answered.  Breast self exam technique reviewed and patient encouraged to perform self-exam monthly.  Discussed healthy lifestyle modifications.  Recommended 30 minutes of aerobic exercise five times per week.  Discussed calcium needs to prevent osteoporosis.

## 2022-05-31 RX ORDER — ALBUTEROL SULFATE 90 UG/1
AEROSOL, METERED RESPIRATORY (INHALATION)
Qty: 18 G | Refills: 5 | Status: SHIPPED | OUTPATIENT
Start: 2022-05-31

## 2022-05-31 RX ORDER — BUDESONIDE AND FORMOTEROL FUMARATE DIHYDRATE 160; 4.5 UG/1; UG/1
AEROSOL RESPIRATORY (INHALATION)
Qty: 10.2 G | Refills: 5 | Status: SHIPPED | OUTPATIENT
Start: 2022-05-31

## 2022-07-22 ENCOUNTER — OFFICE VISIT (OUTPATIENT)
Dept: INTERNAL MEDICINE | Facility: CLINIC | Age: 57
End: 2022-07-22

## 2022-07-22 VITALS
RESPIRATION RATE: 16 BRPM | HEIGHT: 61 IN | OXYGEN SATURATION: 99 % | BODY MASS INDEX: 24.13 KG/M2 | TEMPERATURE: 97.3 F | DIASTOLIC BLOOD PRESSURE: 60 MMHG | SYSTOLIC BLOOD PRESSURE: 122 MMHG | HEART RATE: 67 BPM | WEIGHT: 127.8 LBS

## 2022-07-22 DIAGNOSIS — I10 ESSENTIAL HYPERTENSION: Chronic | ICD-10-CM

## 2022-07-22 DIAGNOSIS — Z00.00 ENCOUNTER FOR PREVENTIVE HEALTH EXAMINATION: Primary | ICD-10-CM

## 2022-07-22 DIAGNOSIS — E78.00 HIGH CHOLESTEROL: Chronic | ICD-10-CM

## 2022-07-22 PROBLEM — E55.9 VITAMIN D DEFICIENCY: Status: ACTIVE | Noted: 2022-07-22

## 2022-07-22 PROBLEM — J45.909 ASTHMA: Chronic | Status: ACTIVE | Noted: 2017-03-07

## 2022-07-22 PROBLEM — M85.89 OSTEOPENIA OF MULTIPLE SITES: Chronic | Status: ACTIVE | Noted: 2020-06-09

## 2022-07-22 PROCEDURE — 99396 PREV VISIT EST AGE 40-64: CPT | Performed by: INTERNAL MEDICINE

## 2022-07-22 RX ORDER — ATORVASTATIN CALCIUM 40 MG/1
40 TABLET, FILM COATED ORAL DAILY
Qty: 90 TABLET | Refills: 1 | Status: SHIPPED | OUTPATIENT
Start: 2022-07-22 | End: 2023-01-20

## 2022-07-22 NOTE — PROGRESS NOTES
Subjective   Carolin Kauffman is a 57 y.o. female.     Chief Complaint   Patient presents with   • Annual Exam   • Asthma   • Hypertension         In for annual preventive exam.  Sleeps 6 to 7 hours per night.  Exercises 3-4 days/week.  Energy is good.  Diet is not as good lately, previously well-balanced.  She has lifelong asthma that is been under pretty good control with medication.    Asthma  She complains of wheezing. There is no cough or shortness of breath. Pertinent negatives include no appetite change, chest pain, fever, headaches or myalgias. Her past medical history is significant for asthma.   Hypertension  This is a new problem. The current episode started more than 1 year ago. The problem is unchanged. Pertinent negatives include no chest pain, headaches, palpitations or shortness of breath. There are no associated agents to hypertension. Risk factors for coronary artery disease include family history and post-menopausal state. Past treatments include nothing. There is no history of angina, kidney disease, CAD/MI, CVA or heart failure.        The following portions of the patient's history were reviewed and updated as appropriate: allergies, current medications, past social history and problem list.    Outpatient Medications Marked as Taking for the 7/22/22 encounter (Office Visit) with J Carlos Dodson MD   Medication Sig Dispense Refill   • albuterol sulfate  (90 Base) MCG/ACT inhaler INHALE TWO PUFFS BY MOUTH TWICE A DAY 18 g 5   • Cholecalciferol (VITAMIN D3) 50 MCG (2000 UT) capsule Take 2,000 Units by mouth Daily.     • Ibuprofen (ADVIL PO) Take  by mouth As Needed.     • Symbicort 160-4.5 MCG/ACT inhaler INHALE TWO PUFFS BY MOUTH TWICE A DAY 10.2 g 5   • valsartan (DIOVAN) 160 MG tablet TAKE ONE TABLET BY MOUTH DAILY 90 tablet 3       Review of Systems   Constitutional: Negative for appetite change, chills, diaphoresis, fatigue, fever and unexpected weight change.   Respiratory: Positive  for wheezing. Negative for cough, chest tightness and shortness of breath.    Cardiovascular: Negative for chest pain, palpitations and leg swelling.   Gastrointestinal: Negative for abdominal pain, anal bleeding, blood in stool, constipation, diarrhea, nausea, rectal pain and vomiting.   Endocrine: Negative for cold intolerance and heat intolerance.   Genitourinary: Negative for difficulty urinating, dysuria, flank pain, frequency, hematuria and urgency.   Musculoskeletal: Positive for arthralgias ( hands) and back pain. Negative for myalgias.   Allergic/Immunologic: Negative for environmental allergies.   Neurological: Negative for dizziness, syncope, speech difficulty, weakness, light-headedness, numbness and headaches.   Hematological: Does not bruise/bleed easily.   Psychiatric/Behavioral: Negative for agitation, confusion, dysphoric mood and sleep disturbance. The patient is nervous/anxious.        Objective   Vitals:    07/22/22 1032   BP: 122/60   Pulse: 67   Resp: 16   Temp: 97.3 °F (36.3 °C)   SpO2: 99%      Wt Readings from Last 3 Encounters:   07/22/22 58 kg (127 lb 12.8 oz)   03/03/22 60.8 kg (134 lb)   12/27/21 62.1 kg (137 lb)    Body mass index is 23.76 kg/m².      Physical Exam   Constitutional: She is oriented to person, place, and time. She appears well-developed.   HENT:   Head: Normocephalic and atraumatic.   Right Ear: External ear normal.   Left Ear: External ear normal.   Nose: Nose normal.   Eyes: Pupils are equal, round, and reactive to light. Conjunctivae are normal.   Neck: No JVD present. No thyromegaly present.   Cardiovascular: Normal rate, regular rhythm and normal heart sounds. Exam reveals no gallop.   No murmur heard.  Pulmonary/Chest: Effort normal and breath sounds normal. No respiratory distress. She has no wheezes. She has no rales.   Abdominal: Soft. Normal appearance and bowel sounds are normal. She exhibits no distension and no mass. There is no abdominal tenderness. There  is no guarding. No hernia.   Musculoskeletal: Normal range of motion.   Lymphadenopathy:     She has no cervical adenopathy.   Neurological: She is alert and oriented to person, place, and time. She displays normal reflexes. No cranial nerve deficit. Coordination normal.   Skin: Skin is warm and dry.   Psychiatric: Her behavior is normal. Mood, judgment and thought content normal.   Nursing note and vitals reviewed.        Problems Addressed this Visit        Cardiac and Vasculature    High cholesterol (Chronic)    Essential hypertension (Chronic)      Other Visit Diagnoses     Encounter for preventive health examination    -  Primary      Diagnoses       Codes Comments    Encounter for preventive health examination    -  Primary ICD-10-CM: Z00.00  ICD-9-CM: V70.0     Essential hypertension     ICD-10-CM: I10  ICD-9-CM: 401.9     High cholesterol     ICD-10-CM: E78.00  ICD-9-CM: 272.0         Assessment & Plan   In for annual preventive exam.  Overall health is excellent.  She has a history of lifelong asthma that is under good control.  Cholesterol runs little high but she has an excellent HDL cholesterol.  Coronary risk is very low.  Blood pressure is under excellent control.  She is due for annual lab work today which will include CBC, CMP, lipids, UA.  Vitamin D level today.  Vitamin D is slightly low so we will add vitamin D3 2000 units daily given her osteopenia.  She has osteopenia on a recent DEXA scan and is now taking some additional calcium.  Follow-up preventive exam in 1 year.  We will update Prevnar 20 when supply comes in.  She has grave concerns about cardiac risk given her mother having had multiple heart attacks and her cholesterol which is quite high despite her good HDL.  CT calcium score was 0 in 2017.  We will go ahead and begin on Lipitor 40 mg daily and see how she does.  Recheck blood pressure and lipids in 6 months.    Prevention counseling was performed today. The counseling performed was  routine health maintenance topics including BMI and exercise.    The above information was reviewed again today 07/22/22.  It continues to be accurate as reflected above and is unchanged.  History, physical and review of systems all reviewed and are unchanged.  Medications were reviewed today and continue the current dosing.    PPE today includes face mask and eye shield.          The 10-year ASCVD risk score (Jun CISNEROS Jr., et al., 2013) is: 4.1%    Values used to calculate the score:      Age: 57 years      Sex: Female      Is Non- : No      Diabetic: No      Tobacco smoker: No      Systolic Blood Pressure: 122 mmHg      Is BP treated: Yes      HDL Cholesterol: 70 mg/dL      Total Cholesterol: 320 mg/dL           Chan disclaimer:   Much of this encounter note is an electronic transcription/translation of spoken language to printed text. The electronic translation of spoken language may permit erroneous, or at times, nonsensical words or phrases to be inadvertently transcribed; Although I have reviewed the note for such errors, some may still exist.

## 2022-08-23 RX ORDER — VALSARTAN 160 MG/1
TABLET ORAL
Qty: 90 TABLET | Refills: 3 | Status: SHIPPED | OUTPATIENT
Start: 2022-08-23

## 2022-09-23 ENCOUNTER — TRANSCRIBE ORDERS (OUTPATIENT)
Dept: ADMINISTRATIVE | Facility: HOSPITAL | Age: 57
End: 2022-09-23

## 2022-09-23 DIAGNOSIS — Z86.79 HISTORY OF HEART DISEASE: Primary | ICD-10-CM

## 2022-10-12 ENCOUNTER — TELEPHONE (OUTPATIENT)
Dept: INTERNAL MEDICINE | Facility: CLINIC | Age: 57
End: 2022-10-12

## 2022-10-12 ENCOUNTER — TELEMEDICINE (OUTPATIENT)
Dept: INTERNAL MEDICINE | Facility: CLINIC | Age: 57
End: 2022-10-12

## 2022-10-12 DIAGNOSIS — U07.1 COVID-19 VIRUS INFECTION: Primary | ICD-10-CM

## 2022-10-12 PROCEDURE — 99213 OFFICE O/P EST LOW 20 MIN: CPT | Performed by: INTERNAL MEDICINE

## 2022-10-12 NOTE — PROGRESS NOTES
Mode of Visit: Video  Location of patient: home  [place] [facility] [place]  You have chosen to receive care through a telehealth visit.  Does the patient consent to use a video/audio connection for your medical care today? Yes  The visit included audio and video interaction. No technical issues occurred during this visit.

## 2022-10-12 NOTE — PROGRESS NOTES
Subjective   Carolin Kauffman is a 57 y.o. female.     Chief Complaint   Patient presents with   • COVID-19+     10/11/22   • Cough   • Nasal Congestion     10/10/22 sCRATCHY THROAT BEGAN         Cough  Associated symptoms include a fever and headaches. Pertinent negatives include no chills, myalgias or sore throat.        The following portions of the patient's history were reviewed and updated as appropriate: allergies, current medications, past social history and problem list.    Outpatient Medications Marked as Taking for the 10/12/22 encounter (Telemedicine) with J Carlos Dodson MD   Medication Sig Dispense Refill   • albuterol sulfate  (90 Base) MCG/ACT inhaler INHALE TWO PUFFS BY MOUTH TWICE A DAY 18 g 5   • atorvastatin (Lipitor) 40 MG tablet Take 1 tablet by mouth Daily. 90 tablet 1   • Cholecalciferol (VITAMIN D3) 50 MCG (2000 UT) capsule Take 2,000 Units by mouth Daily.     • Ibuprofen (ADVIL PO) Take  by mouth As Needed.     • Symbicort 160-4.5 MCG/ACT inhaler INHALE TWO PUFFS BY MOUTH TWICE A DAY 10.2 g 5   • valsartan (DIOVAN) 160 MG tablet TAKE ONE TABLET BY MOUTH DAILY 90 tablet 3       Review of Systems   Constitutional: Positive for fever. Negative for chills.   HENT: Negative for congestion and sore throat.    Respiratory: Positive for cough.    Gastrointestinal: Negative for diarrhea.   Musculoskeletal: Negative for myalgias.   Neurological: Positive for headaches.       Objective   There were no vitals filed for this visit.   Wt Readings from Last 3 Encounters:   07/22/22 58 kg (127 lb 12.8 oz)   03/03/22 60.8 kg (134 lb)   12/27/21 62.1 kg (137 lb)    There is no height or weight on file to calculate BMI.      Physical Exam  Constitutional:       Appearance: Normal appearance.   Pulmonary:      Effort: Pulmonary effort is normal.   Neurological:      Mental Status: She is alert.   Psychiatric:         Mood and Affect: Mood normal.         Behavior: Behavior normal.         Thought  Content: Thought content normal.         Judgment: Judgment normal.           Problems Addressed this Visit        Other    RESOLVED: COVID-19 virus infection - Primary   Diagnoses       Codes Comments    COVID-19 virus infection    -  Primary ICD-10-CM: U07.1  ICD-9-CM: 079.89         Assessment & Plan   Video visit today due to COVID pandemic.  She is got a 2-day illness.  Tested positive for COVID yesterday.  Mainly head congestion.  Slight cough.  She is felt a little feverish.  Mild headache.  She does have a history of asthma.  Her Symbicort dose is down to about 1 puff twice weekly.  Advised that she go ahead and boost the Symbicort back to 1 puff twice daily until she is feeling better.  Robitussin-DM as needed.  Antihistamines as needed.  Tylenol or Advil as needed.  We discussed proper quarantining to include 5 days with no fever at the end of 5 days and masking for additional 5 days.  To get an oxygen monitor and measure oxygen levels to help with any decision making about going to the emergency room.  [employer] platform used for the visit today.    The above information was reviewed again today 10/12/22.  It continues to be accurate as reflected above and is unchanged.  History, physical and review of systems all reviewed and are unchanged.  Medications were reviewed today and continue the current dosing.             Chan disclaimer:   Much of this encounter note is an electronic transcription/translation of spoken language to printed text. The electronic translation of spoken language may permit erroneous, or at times, nonsensical words or phrases to be inadvertently transcribed; Although I have reviewed the note for such errors, some may still exist.

## 2022-10-12 NOTE — TELEPHONE ENCOUNTER
Caller: Carolin Kauffman    Relationship to patient: Self    Best call back number:      [phone] (Mobile)         Date of exposure:    Date of positive COVID19 test:  10-11-22    Date if possible COVID19 exposure:  COVID19 symptoms: SCRATCHY THROAT, RUNNY NOSE     Date of initial quarantine:     Additional information or concerns: *WANTED TO KNOW IF SOMEONE COULD CALL HER AND DISCUSS OPTIONS WITH HER        What is the patients preferred pharmacy:      [facility] [id number] [place] [id number] [address] AT [facility] [address] - [phone]  - [phone]   [phone]

## 2022-10-17 ENCOUNTER — APPOINTMENT (OUTPATIENT)
Dept: CT IMAGING | Facility: HOSPITAL | Age: 57
End: 2022-10-17

## 2022-11-11 ENCOUNTER — HOSPITAL ENCOUNTER (OUTPATIENT)
Dept: CT IMAGING | Facility: HOSPITAL | Age: 57
Discharge: HOME | End: 2022-11-11
Admitting: INTERNAL MEDICINE

## 2022-11-11 DIAGNOSIS — Z86.79 HISTORY OF HEART DISEASE: ICD-10-CM

## 2022-11-11 PROCEDURE — 75571 CT HRT W/O DYE W/CA TEST: CPT

## 2023-01-19 ENCOUNTER — OFFICE VISIT (OUTPATIENT)
Dept: INTERNAL MEDICINE | Facility: CLINIC | Age: 58
End: 2023-01-19
Payer: COMMERCIAL

## 2023-01-19 VITALS
RESPIRATION RATE: 18 BRPM | BODY MASS INDEX: 24.11 KG/M2 | HEIGHT: 62 IN | DIASTOLIC BLOOD PRESSURE: 62 MMHG | SYSTOLIC BLOOD PRESSURE: 110 MMHG | WEIGHT: 131 LBS | OXYGEN SATURATION: 99 % | TEMPERATURE: 97.8 F | HEART RATE: 73 BPM

## 2023-01-19 DIAGNOSIS — Z23 NEED FOR VACCINATION: Primary | ICD-10-CM

## 2023-01-19 DIAGNOSIS — E78.00 HIGH CHOLESTEROL: Chronic | ICD-10-CM

## 2023-01-19 DIAGNOSIS — I10 ESSENTIAL HYPERTENSION: Chronic | ICD-10-CM

## 2023-01-19 DIAGNOSIS — Z79.899 MEDICATION MANAGEMENT: ICD-10-CM

## 2023-01-19 LAB
CHOLEST SERPL-MCNC: 174 MG/DL (ref 0–200)
CHOLEST/HDLC SERPL: 2.45 {RATIO}
HDLC SERPL-MCNC: 71 MG/DL (ref 40–60)
LDLC SERPL CALC-MCNC: 89 MG/DL (ref 0–100)
TRIGL SERPL-MCNC: 76 MG/DL (ref 0–150)
VLDLC SERPL CALC-MCNC: 14 MG/DL (ref 5–40)

## 2023-01-19 PROCEDURE — 91312 COVID-19 (PFIZER) BIVALENT BOOSTER 12+YRS: CPT | Performed by: INTERNAL MEDICINE

## 2023-01-19 PROCEDURE — 99214 OFFICE O/P EST MOD 30 MIN: CPT | Performed by: INTERNAL MEDICINE

## 2023-01-19 PROCEDURE — 0124A COVID-19 (PFIZER) BIVALENT BOOSTER 12+YRS: CPT | Performed by: INTERNAL MEDICINE

## 2023-01-19 NOTE — PROGRESS NOTES
Subjective   Carolin Kauffman is a 57 y.o. female.     Chief Complaint   Patient presents with   • Hyperlipidemia   • Hypertension         Hyperlipidemia  This is a new problem. The current episode started more than 1 month ago. Pertinent negatives include no chest pain or shortness of breath. Current antihyperlipidemic treatment includes statins.   Hypertension  This is a chronic problem. The current episode started more than 1 year ago. The problem is unchanged. Pertinent negatives include no chest pain, headaches, palpitations or shortness of breath.        The following portions of the patient's history were reviewed and updated as appropriate: allergies, current medications, past social history and problem list.    Outpatient Medications Marked as Taking for the 1/19/23 encounter (Office Visit) with J Carlos Dodson MD   Medication Sig Dispense Refill   • albuterol sulfate  (90 Base) MCG/ACT inhaler INHALE TWO PUFFS BY MOUTH TWICE A DAY 18 g 5   • atorvastatin (Lipitor) 40 MG tablet Take 1 tablet by mouth Daily. 90 tablet 1   • Cholecalciferol (VITAMIN D3) 50 MCG (2000 UT) capsule Take 2,000 Units by mouth Daily.     • Ibuprofen (ADVIL PO) Take  by mouth As Needed.     • Symbicort 160-4.5 MCG/ACT inhaler INHALE TWO PUFFS BY MOUTH TWICE A DAY 10.2 g 5   • valsartan (DIOVAN) 160 MG tablet TAKE ONE TABLET BY MOUTH DAILY 90 tablet 3       Review of Systems   Respiratory: Negative for cough, shortness of breath and wheezing.    Cardiovascular: Negative for chest pain, palpitations and leg swelling.   Gastrointestinal: Negative for abdominal pain, constipation and diarrhea.   Neurological: Negative for headaches.       Objective   Vitals:    01/19/23 0804   BP: 110/62   Pulse: 73   Resp: 18   Temp: 97.8 °F (36.6 °C)   SpO2: 99%      Wt Readings from Last 3 Encounters:   01/19/23 59.4 kg (131 lb)   07/22/22 58 kg (127 lb 12.8 oz)   03/03/22 60.8 kg (134 lb)    Body mass index is 24.35 kg/m².      Physical  Exam  Constitutional:       Appearance: Normal appearance. She is well-developed.   Neck:      Thyroid: No thyromegaly.   Cardiovascular:      Rate and Rhythm: Normal rate and regular rhythm.      Heart sounds: Normal heart sounds. No murmur heard.    No gallop.   Pulmonary:      Effort: Pulmonary effort is normal. No respiratory distress.      Breath sounds: Normal breath sounds. No wheezing or rales.   Neurological:      Mental Status: She is alert.           Problems Addressed this Visit        Cardiac and Vasculature    High cholesterol (Chronic)    Essential hypertension (Chronic)   Other Visit Diagnoses     Need for vaccination    -  Primary    Relevant Orders    COVID-19 Bivalent Booster (Pfizer) 12+yrs (Completed)      Diagnoses       Codes Comments    Need for vaccination    -  Primary ICD-10-CM: Z23  ICD-9-CM: V05.9     Essential hypertension     ICD-10-CM: I10  ICD-9-CM: 401.9     High cholesterol     ICD-10-CM: E78.00  ICD-9-CM: 272.0         Assessment & Plan   In today for recheck of hypertension and hyperlipidemia.  Began on Lipitor in July 2022.  She is asymptomatic at the present time.  Remains on Lipitor 40 mg daily and Diovan 160 mg daily for hypertension those are reviewed today.  We will recheck lipid profile today.  Annual preventive exam will be July 2023    The above information was reviewed again today 01/19/23.  It continues to be accurate as reflected above and is unchanged.  History, physical and review of systems all reviewed and are unchanged.  Medications were reviewed today and continue the current dosing.    PPE today includes face mask and eye shield.               Dragon disclaimer:   Part of this note may be an electronic transcription/translation of spoken language to printed text using the Dragon Dictation System.

## 2023-01-20 RX ORDER — ATORVASTATIN CALCIUM 40 MG/1
TABLET, FILM COATED ORAL
Qty: 90 TABLET | Refills: 1 | Status: SHIPPED | OUTPATIENT
Start: 2023-01-20

## 2023-03-23 ENCOUNTER — PROCEDURE VISIT (OUTPATIENT)
Dept: OBSTETRICS AND GYNECOLOGY | Age: 58
End: 2023-03-23
Payer: COMMERCIAL

## 2023-03-23 ENCOUNTER — OFFICE VISIT (OUTPATIENT)
Dept: OBSTETRICS AND GYNECOLOGY | Age: 58
End: 2023-03-23
Payer: COMMERCIAL

## 2023-03-23 VITALS — BODY MASS INDEX: 24.35 KG/M2 | HEIGHT: 62 IN

## 2023-03-23 DIAGNOSIS — Z12.31 VISIT FOR SCREENING MAMMOGRAM: Primary | ICD-10-CM

## 2023-03-23 DIAGNOSIS — Z01.419 ENCOUNTER FOR GYNECOLOGICAL EXAMINATION WITHOUT ABNORMAL FINDING: Primary | ICD-10-CM

## 2023-03-23 PROCEDURE — 99396 PREV VISIT EST AGE 40-64: CPT | Performed by: OBSTETRICS & GYNECOLOGY

## 2023-03-23 NOTE — PROGRESS NOTES
Subjective     Chief Complaint   Patient presents with   • Gynecologic Exam     AC & MG       History of Present Illness    Carolin Kauffman is a 58 y.o.  who presents for annual exam.  The patient is doing well.  She is running and plans to run the [place] races.  She did have a respiratory illness back several months ago and had to take about 3 months off.  She has no pelvic pain.  She is sexually active.  No vaginal bleeding.  Her mother had compression fractures in her 60s.  Patient's bone density last year showed osteopenia with normal FRAX.      Obstetric History:  OB History        3    Para   3    Term   3            AB        Living           SAB        IAB        Ectopic        Molar        Multiple        Live Births                   Menstrual History:     Patient's last menstrual period was 2017.         Current contraception: post menopausal status  History of abnormal Pap smear: no  Received Gardasil immunization: no  Perform regular self breast exam : yes  Family history of uterine or ovarian cancer: no  Family History of colon cancer: no  Family history of breast cancer: no    Mammogram: done today.  Colonoscopy: up to date.  Done at age 49.  Repeat every 10 years  DEXA: up to date.  Osteopenia in  plan to repeat in     Exercise: exercises 4 times a week runs and bikes   Calcium/Vitamin D: adequate intake and uses supplements    The following portions of the patient's history were reviewed and updated as appropriate: allergies, current medications, past family history, past medical history, past social history, past surgical history and problem list.    Review of Systems    Review of Systems   Constitutional: Negative for fatigue.   Respiratory: Negative for shortness of breath.    Gastrointestinal: Negative for abdominal pain.   Genitourinary: Negative for dysuria.   Neurological: Negative for headaches.   Psychiatric/Behavioral: Negative for dysphoric mood.  "    Objective   Physical Exam    Ht 156.2 cm (61.5\")   LMP 08/01/2017   BMI 24.35 kg/m²     General:   alert, appears stated age and cooperative   Neck: thyroid normal to palpation   Heart: regular rate and rhythm   Lungs: clear to auscultation bilaterally   Abdomen: soft, non-tender, without masses or organomegaly   Breast: inspection negative, no nipple discharge or bleeding, no masses or nodularity palpable   Vulva: normal, Bartholin's, Urethra, Merrifield's normal   Vagina:  Small vagina with atrophy and pallor noted.  No lesions seen   Cervix: no cervical motion tenderness and no lesions   Uterus: non-tender, normal shape and consistency   Adnexa: no mass, fullness, tenderness   Rectal: normal rectal, no masses     Assessment & Plan   Diagnoses and all orders for this visit:    1. Encounter for gynecological examination without abnormal finding (Primary)    Osteopenia-patient has excellent exercise habits and good intake of calcium and vitamin D.  Encouraged patient to continue.  We will recheck bone density next year.    All questions answered.  Breast self exam technique reviewed and patient encouraged to perform self-exam monthly.  Discussed healthy lifestyle modifications.  Recommended 30 minutes of aerobic exercise five times per week.  Discussed calcium needs to prevent osteoporosis.                     "

## 2023-04-05 ENCOUNTER — OFFICE VISIT (OUTPATIENT)
Dept: ORTHOPEDIC SURGERY | Facility: CLINIC | Age: 58
End: 2023-04-05
Payer: COMMERCIAL

## 2023-04-05 VITALS — TEMPERATURE: 97.6 F | HEIGHT: 61 IN | WEIGHT: 135 LBS | BODY MASS INDEX: 25.49 KG/M2

## 2023-04-05 DIAGNOSIS — R52 PAIN: ICD-10-CM

## 2023-04-05 DIAGNOSIS — M43.16 SPONDYLOLISTHESIS OF LUMBAR REGION: ICD-10-CM

## 2023-04-05 DIAGNOSIS — M54.16 LUMBAR RADICULOPATHY: Primary | ICD-10-CM

## 2023-04-05 PROCEDURE — 99213 OFFICE O/P EST LOW 20 MIN: CPT | Performed by: NURSE PRACTITIONER

## 2023-04-05 PROCEDURE — 72100 X-RAY EXAM L-S SPINE 2/3 VWS: CPT | Performed by: NURSE PRACTITIONER

## 2023-04-05 NOTE — PROGRESS NOTES
Patient Name: Carolin Kauffman   YOB: 1965  Referring Primary Care Physician: J Carlos Dodson MD      Chief Complaint:    Chief Complaint   Patient presents with   • Lumbar Spine - Initial Evaluation, Pain        HPI:  Carolin Kauffman is a 58 y.o. female who [facility] of right low back and hip pain which occasionally refers to the right lateral thigh and rarely into the calf.  Previously seen by Dr. Villarreal for similar complaints and was referred to physical therapy.  She has been doing fairly well since that time.  Currently flared up since last weekend when she tried to run a half marathon.  She says during the run she had to stop because of the flareup of pain.  No bowel or bladder dysfunction or saddle anesthesia.  Established patient to the practice, new to me.  Has also seen Dr. Harvey for plantar fasciitis and Jenny Ray for hip pain.  Prior pertinent records were reviewed.    PFSH:  See attached    ROS: As per HPI, otherwise negative    Objective:    Vitals:    04/05/23 0854   Temp: 97.6 °F (36.4 °C)     Body mass index is 25.51 kg/m².      Neurologic Exam   Right Hip Exam     Tenderness   The patient is experiencing no tenderness.     Tests   PATITO: negative    Other   Erythema: absent  Sensation: normal      Back Exam     Tenderness   The patient is experiencing no tenderness.     Muscle Strength   The patient has normal back strength.    Tests   Straight leg raise right: positive at 90 deg  Straight leg raise left: negative    Reflexes   Patellar: 1/4  Achilles: 1/4    Other   Gait: normal   Erythema: no back redness          Physical Exam  Musculoskeletal:      Lumbar back: Positive right straight leg raise test. Negative left straight leg raise test.   Skin:     General: Skin is warm and dry.           IMAGING:     Indication: pain related symptoms,  Views: 2V AP&LAT lumbar AP and lateral and  flexion-extension  Findings: Reviewed and reveals 5.7 mm anterolisthesis L4 on L5 on lateral view, increases to 10 mm in flexion, mild dextroscoliosis appears similar to 8/18/2020        Assessment:           Diagnoses and all orders for this visit:    1. Lumbar radiculopathy (Primary)  -     Ambulatory Referral to Physical Therapy Evaluate and treat    2. Pain  -     XR Spine Lumbar AP & Lateral    3. Spondylolisthesis of lumbar region  -     Ambulatory Referral to Physical Therapy Evaluate and treat             Plan:  Similar complaints to last visit in 2020.  She has remained active with running, but states she increased her distance over the past couple weeks and then tried to run a half marathon this weekend which has her currently flared up with the right low back, hip and leg pain.  No loss of nerve function on exam.  She did well with physical therapy in the past and would like to try that again.  We will refer her back to [facility] where she went before.  We discussed epidural injections as the next level of treatment, but will hold off to see how therapy goes first.  She will call if she does not get significant relief from physical therapy and at that point depending on residual symptoms, will either update lumbar MRI or refer to pain management for epidurals.  Also asked physical therapy to focus on the hip and SI joint.  She will follow-up as needed.  Call with any questions or concerns.    Return if symptoms worsen or fail to improve.

## 2023-04-19 ENCOUNTER — OFFICE VISIT (OUTPATIENT)
Dept: INTERNAL MEDICINE | Facility: CLINIC | Age: 58
End: 2023-04-19
Payer: COMMERCIAL

## 2023-04-19 VITALS
DIASTOLIC BLOOD PRESSURE: 62 MMHG | TEMPERATURE: 93.3 F | HEIGHT: 61 IN | RESPIRATION RATE: 16 BRPM | BODY MASS INDEX: 26.06 KG/M2 | OXYGEN SATURATION: 97 % | HEART RATE: 79 BPM | SYSTOLIC BLOOD PRESSURE: 102 MMHG | WEIGHT: 138 LBS

## 2023-04-19 DIAGNOSIS — M54.16 LUMBAR RADICULOPATHY: Primary | ICD-10-CM

## 2023-04-19 PROCEDURE — 99213 OFFICE O/P EST LOW 20 MIN: CPT | Performed by: INTERNAL MEDICINE

## 2023-04-19 RX ORDER — GABAPENTIN 100 MG/1
200 CAPSULE ORAL NIGHTLY
Qty: 60 CAPSULE | Refills: 0 | Status: SHIPPED | OUTPATIENT
Start: 2023-04-19

## 2023-04-19 NOTE — PROGRESS NOTES
Subjective   Carolin Kauffman is a 58 y.o. female.     Chief Complaint   Patient presents with   • Back Pain         Back Pain  This is a recurrent problem. The problem has been waxing and waning since onset. The pain is present in the gluteal and lumbar spine. The quality of the pain is described as burning and cramping. Pertinent negatives include no fever, numbness or weakness.        The following portions of the patient's history were reviewed and updated as appropriate: allergies, current medications, past social history and problem list.    Outpatient Medications Marked as Taking for the 4/19/23 encounter (Office Visit) with J Carlos Dodson MD   Medication Sig Dispense Refill   • albuterol sulfate  (90 Base) MCG/ACT inhaler INHALE TWO PUFFS BY MOUTH TWICE A DAY 18 g 5   • atorvastatin (LIPITOR) 40 MG tablet TAKE ONE TABLET BY MOUTH DAILY 90 tablet 1   • Cholecalciferol (VITAMIN D3) 50 MCG (2000 UT) capsule Take 1 capsule by mouth Daily.     • Ibuprofen (ADVIL PO) Take  by mouth As Needed.     • Symbicort 160-4.5 MCG/ACT inhaler INHALE TWO PUFFS BY MOUTH TWICE A DAY 10.2 g 5   • valsartan (DIOVAN) 160 MG tablet TAKE ONE TABLET BY MOUTH DAILY 90 tablet 3       Review of Systems   Constitutional: Negative for chills and fever.   Genitourinary: Negative for difficulty urinating.   Musculoskeletal: Positive for back pain. Negative for arthralgias.   Neurological: Negative for weakness and numbness.       Objective   Vitals:    04/19/23 0856   BP: 102/62   Pulse: 79   Resp: 16   Temp: 93.3 °F (34.1 °C)   SpO2: 97%      Wt Readings from Last 3 Encounters:   04/19/23 62.6 kg (138 lb)   04/05/23 61.2 kg (135 lb)   01/19/23 59.4 kg (131 lb)    Body mass index is 26.09 kg/m².      Physical Exam  Constitutional:       Appearance: Normal appearance. She is well-developed.   Neurological:      General: No focal deficit present.      Mental Status: She is alert.      Motor: No weakness or abnormal muscle tone.       Deep Tendon Reflexes: Reflexes are normal and symmetric. Reflexes normal.           Problems Addressed this Visit        Neuro    Lumbar radiculopathy - Primary   Diagnoses       Codes Comments    Lumbar radiculopathy    -  Primary ICD-10-CM: M54.16  ICD-9-CM: 724.4         Assessment & Plan   Began with right hip pain going down the right groin and right leg anteriorly during a race on April 1, 2023.  Had to quit the race.  She had some sciatic symptoms 3 years ago and an MRI at that time showed some spondylolisthesis but otherwise did not look too bad.  Symptoms seem to clear up on their own with physical therapy at that time.  She has not been doing much physical therapy lately because it seems to make her worse instead of better when she does her stretches and so forth.  She has no red flag symptoms.  She saw a nurse practitioner at Dr. Villarreal's office and neurosurgical referral was offered since Dr. Villarreal has retired.  She has not gone back to physical therapy yet due to the experiences with her stretches.  We will add a 12-day Pred pack today and see how she does with it.  We will add gabapentin 200 mg at bedtime and see if that helps as well.  Okay to try the physical therapy again.  She had plain films done 2 weeks ago.  No need for another MRI just yet.  Her symptoms seem to change from 3 years ago.  They seem to be low lumbar last time and higher lumbar this time.  She should seriously consider giving up running.    The above information was reviewed again today 04/19/23.  It continues to be accurate as reflected above and is unchanged.  History, physical and review of systems all reviewed and are unchanged.  Medications were reviewed today and continue the current dosing.    PPE today includes face mask and eye shield.               Dragon disclaimer:   Part of this note may be an electronic transcription/translation of spoken language to printed text using the Dragon Dictation System.

## 2023-04-20 ENCOUNTER — TELEPHONE (OUTPATIENT)
Dept: INTERNAL MEDICINE | Facility: CLINIC | Age: 58
End: 2023-04-20
Payer: COMMERCIAL

## 2023-04-20 DIAGNOSIS — M54.16 LUMBAR RADICULOPATHY: Primary | ICD-10-CM

## 2023-04-20 RX ORDER — PREDNISONE 1 MG/1
TABLET ORAL
Qty: 42 TABLET | Refills: 0 | Status: SHIPPED | OUTPATIENT
Start: 2023-04-20

## 2023-04-20 NOTE — TELEPHONE ENCOUNTER
PATIENT CALLED IN REGARDS OF YESTERDAYS VISIT. SHE STATES SHE THOUGHT DR CALZADA WAS GOING TO PRESCRIBE  A STEROID FOR HER BACK ISSUES. SHE HAS RECEIVED THE GABAPENTIN.     [facility] [id number] - [place] [address] AT [address] - [phone]  - [phone] FX  [phone]      SHE FORGOT AT THE VISIT TO ASK DR. CALZADA ABOUT HE TOE FUNGUS. IS THERE SOMETHING HE CAN SUGGEST ABOUT THAT. SHE HAS FUNGUS ON RIGHT FOOT SECOND TOE. HE DID NOT LOOK AT HER TOE.    PLEASE CALL [phone]

## 2023-05-03 ENCOUNTER — TELEMEDICINE (OUTPATIENT)
Dept: INTERNAL MEDICINE | Facility: CLINIC | Age: 58
End: 2023-05-03
Payer: COMMERCIAL

## 2023-05-03 VITALS — RESPIRATION RATE: 16 BRPM | BODY MASS INDEX: 26.06 KG/M2 | WEIGHT: 138 LBS | HEIGHT: 61 IN

## 2023-05-03 DIAGNOSIS — J01.10 ACUTE NON-RECURRENT FRONTAL SINUSITIS: Primary | ICD-10-CM

## 2023-05-03 PROCEDURE — 99213 OFFICE O/P EST LOW 20 MIN: CPT | Performed by: INTERNAL MEDICINE

## 2023-05-03 RX ORDER — AMOXICILLIN AND CLAVULANATE POTASSIUM 875; 125 MG/1; MG/1
1 TABLET, FILM COATED ORAL EVERY 12 HOURS SCHEDULED
Qty: 20 TABLET | Refills: 0 | Status: SHIPPED | OUTPATIENT
Start: 2023-05-03

## 2023-05-03 NOTE — PROGRESS NOTES
Subjective   Carolin Kauffman is a 58 y.o. female.     Chief Complaint   Patient presents with   • Nasal Congestion   • Headache   • Facial Pain         Facial Pain  This is a new problem. The current episode started 1 to 4 weeks ago. The problem occurs constantly. Associated symptoms include congestion, coughing and headaches. Pertinent negatives include no fever or sore throat.        The following portions of the patient's history were reviewed and updated as appropriate: allergies, current medications, past social history and problem list.    Outpatient Medications Marked as Taking for the 5/3/23 encounter (Telemedicine) with J Carlos Dodson MD   Medication Sig Dispense Refill   • albuterol sulfate  (90 Base) MCG/ACT inhaler INHALE TWO PUFFS BY MOUTH TWICE A DAY 18 g 5   • atorvastatin (LIPITOR) 40 MG tablet TAKE ONE TABLET BY MOUTH DAILY 90 tablet 1   • Cholecalciferol (VITAMIN D3) 50 MCG (2000 UT) capsule Take 1 capsule by mouth Daily.     • gabapentin (NEURONTIN) 100 MG capsule Take 2 capsules by mouth Every Night. 60 capsule 0   • Ibuprofen (ADVIL PO) Take  by mouth As Needed.     • predniSONE (DELTASONE) 5 MG tablet 6 tabs for 2 days, then 5 tabs, 4 tabs, 3 tabs, 2 tabs, 1 tab for 2 days each. 42 tablet 0   • Symbicort 160-4.5 MCG/ACT inhaler INHALE TWO PUFFS BY MOUTH TWICE A DAY 10.2 g 5   • valsartan (DIOVAN) 160 MG tablet TAKE ONE TABLET BY MOUTH DAILY 90 tablet 3       Review of Systems   Constitutional: Negative for fever.   HENT: Positive for congestion. Negative for sore throat.    Respiratory: Positive for cough.    Neurological: Positive for headaches.       Objective   Vitals:    05/03/23 0810   Resp: 16      Wt Readings from Last 3 Encounters:   05/03/23 62.6 kg (138 lb)   04/19/23 62.6 kg (138 lb)   04/05/23 61.2 kg (135 lb)    Body mass index is 26.09 kg/m².      Physical Exam  Constitutional:       Appearance: Normal appearance.   Pulmonary:      Effort: Pulmonary effort is  normal.   Neurological:      Mental Status: She is alert.   Psychiatric:         Mood and Affect: Mood normal.         Behavior: Behavior normal.         Thought Content: Thought content normal.         Judgment: Judgment normal.           Problems Addressed this Visit    None  Visit Diagnoses     Acute non-recurrent frontal sinusitis    -  Primary      Diagnoses       Codes Comments    Acute non-recurrent frontal sinusitis    -  Primary ICD-10-CM: J01.10  ICD-9-CM: 461.1         Assessment & Plan   Video visit today due to COVID pandemic.  After last visit she developed head congestion and postnasal drip.  She has some facial pain and frontal headaches.  Vague teeth ache.  She has been taking Claritin-D without success.  Just completed a round of prednisone yesterday for her back.  We will add Augmentin 875 twice daily for 10 days.  Continue the Claritin-D.  She could try a Tiff pot.  [employer] platform used for the visit today.    The above information was reviewed again today 05/03/23.  It continues to be accurate as reflected above and is unchanged.  History, physical and review of systems all reviewed and are unchanged.  Medications were reviewed today and continue the current dosing.               Dragon disclaimer:   Part of this note may be an electronic transcription/translation of spoken language to printed text using the Dragon Dictation System.

## 2023-05-03 NOTE — PROGRESS NOTES
Mode of Visit: Video  Location of patient: home  Location of provider: [facility] [place] [facility]  You have chosen to receive care through a telehealth visit.  Does the patient consent to use a video/audio connection for your medical care today? Yes  The visit included audio and video interaction. No technical issues occurred during this visit.

## 2023-05-15 DIAGNOSIS — M54.16 LUMBAR RADICULOPATHY: ICD-10-CM

## 2023-05-15 RX ORDER — GABAPENTIN 100 MG/1
CAPSULE ORAL
Qty: 180 CAPSULE | Refills: 0 | Status: SHIPPED | OUTPATIENT
Start: 2023-05-15

## 2023-05-25 ENCOUNTER — OFFICE VISIT (OUTPATIENT)
Dept: ORTHOPEDIC SURGERY | Facility: CLINIC | Age: 58
End: 2023-05-25
Payer: COMMERCIAL

## 2023-05-25 VITALS — WEIGHT: 138 LBS | HEIGHT: 61 IN | TEMPERATURE: 97.3 F | RESPIRATION RATE: 16 BRPM | BODY MASS INDEX: 26.06 KG/M2

## 2023-05-25 DIAGNOSIS — M54.16 LUMBAR RADICULOPATHY: Primary | ICD-10-CM

## 2023-05-25 DIAGNOSIS — M43.16 SPONDYLOLISTHESIS OF LUMBAR REGION: ICD-10-CM

## 2023-05-25 DIAGNOSIS — M25.551 RIGHT HIP PAIN: ICD-10-CM

## 2023-05-25 NOTE — PROGRESS NOTES
Patient Name: Carolin Kauffman   YOB: 1965  Referring Primary Care Physician: J Carlos Dodson MD  BMI: Body mass index is 26.09 kg/m².    Chief Complaint:    Chief Complaint   Patient presents with   • Right Hip - Follow-up        HPI:     Carolin Kauffman is a 58 y.o. female who presents today for evaluation of   Chief Complaint   Patient presents with   • Right Hip - Follow-up   .  Patient is a 58-year-old  who enjoys recreational running.  He says she is always tried to run at uneven slope pace.  She says beginning of April she was in a triple crown race coming down [place].  He said she felt pain in her right buttock and hip.  That radiated somewhat down her leg.  She said it went all the way down the leg.  On April 5 she saw Sandra Munoz and Sandra evaluated her and recommended physical therapy which she has been doing.  It does help when she does it.  Her PCP also gave her a Medrol Dosepak as well as some Neurontin but she did not want to take the Neurontin but did take the Medrol and feels like it got better.  Says the physical therapist was advised and gradually get back to getting back into running starting about 1/2 mile building up as her body allowed.  She really not been taking anti-inflammatories etc.  She denies any contraindications to them.  She wanted to get checked because she was having hip pain      Subjective   Medications:   Home Medications:  Current Outpatient Medications on File Prior to Visit   Medication Sig   • albuterol sulfate  (90 Base) MCG/ACT inhaler INHALE TWO PUFFS BY MOUTH TWICE A DAY   • amoxicillin-clavulanate (Augmentin) 875-125 MG per tablet Take 1 tablet by mouth Every 12 (Twelve) Hours.   • atorvastatin (LIPITOR) 40 MG tablet TAKE ONE TABLET BY MOUTH DAILY   • Cholecalciferol (VITAMIN D3) 50 MCG (2000 UT) capsule Take 1 capsule by mouth Daily.   • gabapentin (NEURONTIN) 100 MG capsule TAKE TWO CAPSULES BY MOUTH ONCE NIGHTLY   • Ibuprofen  (ADVIL PO) Take  by mouth As Needed.   • predniSONE (DELTASONE) 5 MG tablet 6 tabs for 2 days, then 5 tabs, 4 tabs, 3 tabs, 2 tabs, 1 tab for 2 days each.   • Symbicort 160-4.5 MCG/ACT inhaler INHALE TWO PUFFS BY MOUTH TWICE A DAY   • valsartan (DIOVAN) 160 MG tablet TAKE ONE TABLET BY MOUTH DAILY     No current facility-administered medications on file prior to visit.     Current Medications:  Scheduled Meds:  Continuous Infusions:No current facility-administered medications for this visit.    PRN Meds:.    I have reviewed the patient's medical history in detail and updated the computerized patient record.  Review and summarization of old records includes:    Past Medical History:   Diagnosis Date   • Asthma    • Cataract two years ago-right eye   • Family history of coronary artery disease in mother 3/7/2017   • High cholesterol    • History of medical problems currentely treating for ulnar nerve entrapment   • Hypertension    • Irregular menstrual bleeding    • Menometrorrhagia    • Ovarian cyst    • Ulnar nerve abnormality    • Visual impairment nearsightedness        Past Surgical History:   Procedure Laterality Date   • BREAST BIOPSY Left    • CATARACT EXTRACTION     •  SECTION     •  SECTION     •  SECTION     • COLONOSCOPY  2015    KATHY Parmar MD   • D & C HYSTEROSCOPY          Social History     Occupational History   • Occupation:    Tobacco Use   • Smoking status: Never   • Smokeless tobacco: Never   Vaping Use   • Vaping Use: Never used   Substance and Sexual Activity   • Alcohol use: Yes     Alcohol/week: 2.0 standard drinks     Types: 2 Glasses of wine per week     Comment: Caffeine use 3 cups daily   • Drug use: No   • Sexual activity: Yes     Partners: Male     Birth control/protection: Post-menopausal      Social History     Social History Narrative   • Not on file        Family History   Problem Relation Age of Onset   • Atrial fibrillation  "Father    • Prostate cancer Father    • Hearing loss Father    • Cancer Father         prostate   • Melanoma Father    • Hypertension Mother    • Osteoporosis Mother    • Heart attack Mother 70        Second heart Attack 2017   • Stroke Mother    • Hyperlipidemia Mother    • Heart disease Mother    • Dementia Mother    • No Known Problems Son    • No Known Problems Son    • No Known Problems Daughter    • Melanoma Maternal Grandmother    • Prostate cancer Paternal Uncle    • Prostate cancer Paternal Uncle        ROS: 14 point review of systems was performed and all other systems were reviewed and are negative except for documented findings in HPI and today's encounter.     Allergies:   Allergies   Allergen Reactions   • Peanut Oil Anaphylaxis     Palm oil wheezing and throat closure   • Palm Oil [Hydrogenated Palm Oil Glycerides] Rash     Constitutional:  Denies fever, shaking or chills   Eyes:  Denies change in visual acuity   HENT:  Denies nasal congestion or sore throat   Respiratory:  Denies cough or shortness of breath   Cardiovascular:  Denies chest pain or severe LE edema   GI:  Denies abdominal pain, nausea, vomiting, bloody stools or diarrhea   Musculoskeletal:  Numbness, tingling, pain, or loss of motor function only as noted above in history of present illness.  : Denies painful urination or hematuria  Integument:  Denies rash, lesion or ulceration   Neurologic:  Denies headache or focal weakness  Endocrine:  Denies lymphadenopathy  Psych:  Denies confusion or change in mental status   Hem:  Denies active bleeding    OBJECTIVE:  Physical Exam: 58 y.o. female  Wt Readings from Last 3 Encounters:   05/25/23 62.6 kg (138 lb)   05/03/23 62.6 kg (138 lb)   04/19/23 62.6 kg (138 lb)     Ht Readings from Last 1 Encounters:   05/25/23 154.9 cm (60.98\")     Body mass index is 26.09 kg/m².  Vitals:    05/25/23 0839   Resp: 16   Temp: 97.3 °F (36.3 °C)     Vital signs reviewed.     General Appearance:    Alert, " cooperative, in no acute distress                  Eyes: conjunctiva clear  ENT: external ears and nose atraumatic  CV: no peripheral edema  Resp: normal respiratory effort  Skin: no rashes or wounds; normal turgor  Psych: mood and affect appropriate  Lymph: no nodes appreciated  Neuro: gross sensation intact  Vascular:  Palpable peripheral pulse in noted extremity  Musculoskeletal Extremities: Exam today shows very pleasant woman her Stinchfield is negative on the right she does have decreased internal rotation on the right versus the left but it does not recreate symptoms.  Flexion add duction and internal rotation does not increase her symptoms diffuse tenderness laterally over the hip and lower back and sacroiliac region.  She is able to transfer and walk well.    Radiology: Indication: pain related symptoms,  Views: 2V AP&LAT lumbar AP and lateral and flexion-extension  Findings: Reviewed and reveals 5.7 mm anterolisthesis L4 on L5 on lateral view, increases to 10 mm in flexion, mild dextroscoliosis appears similar to 8/18/2020  She did have AP of the hip lateral right hip taken the office today for complaints of pain without comparison views that showed mild degenerative changes made in in the inferior portion of her hips but good weightbearing cartilage in the superior region.  She has evidence of degenerative disease in her spine.  I did review her lumbar x-rays including flexion and extension that Sandra Munoz had ordered in the office and agree with the report as above        Assessment:     ICD-10-CM ICD-9-CM   1. Lumbar radiculopathy  M54.16 724.4   2. Spondylolisthesis of lumbar region  M43.16 738.4   3. Right hip pain  M25.551 719.45        MDM/Plan:   The diagnosis(es), natural history, pathophysiology and treatment for diagnosis(es) were discussed. Opportunity given and questions answered.  Biomechanics of pertinent body areas discussed.  When appropriate, the use of ambulatory aids  discussed.    Biomechanics of pertinent body areas discussed.  When appropriate, the use of ambulatory aids discussed.  BMI:  The concept of BMI body mass index and its importance and implications discussed.    EXERCISES:  Advice on benefits of, and types of regular/moderate exercise pertaining to orthopedic diagnosis(es).  Inflammation/pain control; with cold, heat, elevation and/or liniments discussed as appropriate  HOME EXERCISE/PT program encouraged  MEDICAL RECORDS reviewed from other provider(s) for past and current medical history pertinent to this complaint.  It appears that majority of her pain is coming from her spondylolisthesis and lumbar conditions at this time.  Exam certainly reflects that.  Talked her about medication dosing and precautions and warnings home exercise program and gradually get back to running as needed.  If she has more pain be happy to see her but if it is this type of pain is getting worse she could see Sandra Mcknight or Toni    5/25/2023    Dictated utilizing Dragon dictation

## 2023-06-14 RX ORDER — ALBUTEROL SULFATE 90 UG/1
AEROSOL, METERED RESPIRATORY (INHALATION)
Qty: 18 G | Refills: 5 | Status: SHIPPED | OUTPATIENT
Start: 2023-06-14

## 2023-06-14 RX ORDER — BUDESONIDE AND FORMOTEROL FUMARATE DIHYDRATE 160; 4.5 UG/1; UG/1
AEROSOL RESPIRATORY (INHALATION)
Qty: 10.2 G | Refills: 5 | Status: SHIPPED | OUTPATIENT
Start: 2023-06-14

## 2023-07-24 ENCOUNTER — PATIENT ROUNDING (BHMG ONLY) (OUTPATIENT)
Dept: INTERNAL MEDICINE | Facility: CLINIC | Age: 58
End: 2023-07-24
Payer: COMMERCIAL

## 2023-07-24 NOTE — PROGRESS NOTES
July 24, 2023    Hello, may I speak with Carolin Kauffman?    My name is Gloria    I am  [facility]  [address] [place], [place] [address] [address] [address] [phone].    Before we get started may I verify your date of birth? 1965    I am calling to officially welcome you to our practice and ask about your recent visit. Is this a good time to talk? yes    Tell me about your visit with us. What things went well?  My visit was good, the ladies in the office were helpful too.        We're always looking for ways to make our patients' experiences even better. Do you have recommendations on ways we may improve?  no    Overall were you satisfied with your first visit to our practice? yes       I appreciate you taking the time to speak with me today. Is there anything else I can do for you? no      Thank you, and have a great day.

## 2023-08-22 ENCOUNTER — TELEPHONE (OUTPATIENT)
Dept: INTERNAL MEDICINE | Facility: CLINIC | Age: 58
End: 2023-08-22
Payer: COMMERCIAL

## 2023-08-22 RX ORDER — METHYLPREDNISOLONE 4 MG/1
TABLET ORAL
Qty: 21 TABLET | Refills: 0 | Status: SHIPPED | OUTPATIENT
Start: 2023-08-22

## 2023-08-22 NOTE — TELEPHONE ENCOUNTER
Caller: Carolin Kauffman    Relationship: Self    Best call back number: [phone]    What medication are you requesting: STEROID     What are your current symptoms: BACK PAIN FLAIR UP     How long have you been experiencing symptoms: 08/20     Have you had these symptoms before:    [x] Yes  [] No    Have you been treated for these symptoms before:   [x] Yes  [] No    If a prescription is needed, what is your preferred pharmacy and phone number: [facility] [id number] - [place] [address] AT [facility] [facility] [address]  - [phone] [facility] [phone] FX     Additional notes: PATIENT STATES THAT SHE HAS BEEN TRYING TO MANAGE THIS WITH [facility] AND [facility] BUT IT IS STILL FL AIRED UP . PATIENT STATES THAT SHE IS GOING TO BE TRAVELING TO [place] TOMORROW NIGHT AND WOULD LIKE TO HAVE SOMETHING FOR THE PAIN BEFORE THEN. PLEASE ADVISE PATIENT ASAP. SHE HAS RECEIVED THIS STEROID FROM DR. CALZADA BEFORE. DR. CALZADA'S NEXT OFFICE VISIT WAS 08/31.

## 2023-08-22 NOTE — TELEPHONE ENCOUNTER
Juan refill of Medrol Dosepak.  #1 . as directed.  I looked over the chart and could not find any visits regarding back pain.  She is a fairly new patient in the practice.

## 2023-09-05 RX ORDER — VALSARTAN 160 MG/1
TABLET ORAL
Qty: 90 TABLET | Refills: 3 | Status: SHIPPED | OUTPATIENT
Start: 2023-09-05

## 2024-01-22 RX ORDER — ATORVASTATIN CALCIUM 40 MG/1
40 TABLET, FILM COATED ORAL DAILY
Qty: 90 TABLET | Refills: 1 | Status: SHIPPED | OUTPATIENT
Start: 2024-01-22

## 2024-02-05 DIAGNOSIS — Z78.0 POST-MENOPAUSAL: Primary | ICD-10-CM

## 2024-04-12 ENCOUNTER — HOSPITAL ENCOUNTER (OUTPATIENT)
Facility: HOSPITAL | Age: 59
Discharge: HOME | End: 2024-04-12
Payer: COMMERCIAL

## 2024-04-12 ENCOUNTER — OFFICE VISIT (OUTPATIENT)
Dept: OBSTETRICS AND GYNECOLOGY | Age: 59
End: 2024-04-12
Payer: COMMERCIAL

## 2024-04-12 VITALS
BODY MASS INDEX: 25.4 KG/M2 | DIASTOLIC BLOOD PRESSURE: 72 MMHG | WEIGHT: 138 LBS | SYSTOLIC BLOOD PRESSURE: 108 MMHG | HEIGHT: 62 IN

## 2024-04-12 DIAGNOSIS — Z12.4 SCREENING FOR MALIGNANT NEOPLASM OF CERVIX: ICD-10-CM

## 2024-04-12 DIAGNOSIS — Z01.419 ENCOUNTER FOR GYNECOLOGICAL EXAMINATION WITHOUT ABNORMAL FINDING: Primary | ICD-10-CM

## 2024-04-12 DIAGNOSIS — Z12.31 SCREENING MAMMOGRAM FOR BREAST CANCER: ICD-10-CM

## 2024-04-12 DIAGNOSIS — Z11.51 SCREENING FOR HUMAN PAPILLOMAVIRUS (HPV): ICD-10-CM

## 2024-04-12 DIAGNOSIS — Z78.0 POST-MENOPAUSAL: ICD-10-CM

## 2024-04-12 DIAGNOSIS — Z12.31 VISIT FOR SCREENING MAMMOGRAM: ICD-10-CM

## 2024-04-12 DIAGNOSIS — N95.2 VAGINAL ATROPHY: ICD-10-CM

## 2024-04-12 DIAGNOSIS — Z01.419 WELL FEMALE EXAM WITH ROUTINE GYNECOLOGICAL EXAM: ICD-10-CM

## 2024-04-12 PROCEDURE — 77080 DXA BONE DENSITY AXIAL: CPT

## 2024-04-12 PROCEDURE — 77063 BREAST TOMOSYNTHESIS BI: CPT

## 2024-04-12 PROCEDURE — 77067 SCR MAMMO BI INCL CAD: CPT

## 2024-04-12 RX ORDER — ESTRADIOL 0.1 MG/G
CREAM VAGINAL
Qty: 1 EACH | Refills: 11 | Status: SHIPPED | OUTPATIENT
Start: 2024-04-15

## 2024-04-12 NOTE — PROGRESS NOTES
"Subjective     Chief Complaint   Patient presents with    Gynecologic Exam     AC & MG & DEXA       History of Present Illness    Carolin Kauffman is a 59 y.o.  who presents for annual exam.  The patient is still been running some but her mother passed away with vascular disease.  Her father is at [place] home.  Patient is occasionally sexually active.  She does notice a lot of dryness and wanted to try something.  No vaginal bleeding or pelvic pain.  No bladder problems.  Bone density was done today.  Obstetric History:  OB History          3    Para   3    Term   3            AB        Living             SAB        IAB        Ectopic        Molar        Multiple        Live Births                   Menstrual History:     Patient's last menstrual period was 2017.         Current contraception: post menopausal status  History of abnormal Pap smear: no  Received Gardasil immunization: no  Perform regular self breast exam : no   Family history of uterine or ovarian cancer: no  Family History of colon cancer: no  Family history of breast cancer: no    Mammogram: done today.  Colonoscopy:  q1   DEXA: done today.  History of osteopenia with normal FRAX.    Exercise: runs on and off   and bike  ran 10 K   Calcium/Vitamin D: adequate intake and uses supplements    The following portions of the patient's history were reviewed and updated as appropriate: allergies, current medications, past family history, past medical history, past social history, past surgical history, and problem list.    Review of Systems    Review of Systems   Constitutional: Negative for fatigue.   Respiratory: Negative for shortness of breath.    Gastrointestinal: Negative for abdominal pain.   Genitourinary: Negative for dysuria.   Neurological: Negative for headaches.   Psychiatric/Behavioral: Negative for dysphoric mood.     Objective   Physical Exam    /72   Ht 157.5 cm (62\")   Wt 62.6 kg (138 lb)   LMP " 08/01/2017   BMI 25.24 kg/m²     General:   alert, appears stated age and cooperative   Neck: thyroid normal to palpation   Heart: regular rate and rhythm   Lungs: clear to auscultation bilaterally   Abdomen: soft, non-tender, without masses or organomegaly   Breast: inspection negative, no nipple discharge or bleeding, no masses or nodularity palpable   Vulva: normal, Bartholin's, Urethra, [place]'s normal   Vagina: Small introitus, atrophic pale mucosa.  No abnormal discharge.   Cervix: no cervical motion tenderness and no lesions   Uterus: non-tender, normal shape and consistency   Adnexa: no mass, fullness, tenderness   Rectal: normal rectal, no masses     Assessment & Plan   Diagnoses and all orders for this visit:    1. Encounter for gynecological examination without abnormal finding (Primary)    2. Vaginal atrophy    3. Well female exam with routine gynecological exam  -     IGP, Apt HPV,rfx 16 / 18,45    4. Screening for human papillomavirus (HPV)  -     IGP, Apt HPV,rfx 16 / 18,45    5. Screening for malignant neoplasm of cervix  -     IGP, Apt HPV,rfx 16 / 18,45    6. Screening mammogram for breast cancer  -     Mammo Screening Digital Tomosynthesis Bilateral With CAD; Future    Other orders  -     estradiol (ESTRACE VAGINAL) 0.1 MG/GM vaginal cream; Apply blueberry sized amount to labia 2 times weekly  Dispense: 1 each; Refill: 11    Patient will start Estrace cream twice weekly for vaginal atrophy.  She can also use lubricants.  She will call if she has any vaginal bleeding  Pap smear done today  Bone density and mammogram done today.  I will call patient with results of her bone density.    All questions answered.  Breast self exam technique reviewed and patient encouraged to perform self-exam monthly.  Discussed healthy lifestyle modifications.  Recommended 30 minutes of aerobic exercise five times per week.  Discussed calcium needs to prevent osteoporosis.

## 2024-04-15 PROBLEM — M81.6 LOCALIZED OSTEOPOROSIS WITHOUT CURRENT PATHOLOGICAL FRACTURE: Status: ACTIVE | Noted: 2024-04-15

## 2024-04-15 RX ORDER — ALENDRONATE SODIUM 70 MG/1
70 TABLET ORAL
Qty: 12 TABLET | Refills: 3 | Status: SHIPPED | OUTPATIENT
Start: 2024-04-15

## 2024-04-16 ENCOUNTER — TELEMEDICINE (OUTPATIENT)
Dept: INTERNAL MEDICINE | Facility: CLINIC | Age: 59
End: 2024-04-16
Payer: COMMERCIAL

## 2024-04-16 DIAGNOSIS — J30.1 SEASONAL ALLERGIC RHINITIS DUE TO POLLEN: Primary | ICD-10-CM

## 2024-04-16 PROCEDURE — 99213 OFFICE O/P EST LOW 20 MIN: CPT | Performed by: INTERNAL MEDICINE

## 2024-04-16 NOTE — PROGRESS NOTES
Mode of Visit: Video  Location of patient: home  Location of provider: [facility]  You have chosen to receive care through a telehealth visit.           Subjective   Carolin Kauffman is a 59 y.o. female.     Chief Complaint   Patient presents with    URI         History of Present Illness  Video visit today.  She has had 2 months of congestion in the right nostril.  Hard to breathe out of that side.  Greenish secretions have developed.  She has tried Claritin-D without success.  Went to urgent care center 1 week ago and was told this was likely allergies.  They suggested Flonase which patient has declined because of fear that it would interact with her inhaled steroid.       The following portions of the patient's history were reviewed and updated as appropriate: allergies, current medications, past social history and problem list.    Outpatient Medications Marked as Taking for the 4/16/24 encounter (Telemedicine) with J Carlos Dodson MD   Medication Sig Dispense Refill    albuterol sulfate  (90 Base) MCG/ACT inhaler INHALE TWO PUFFS BY MOUTH TWICE A DAY 18 g 5    alendronate (Fosamax) 70 MG tablet Take 1 tablet by mouth Every 7 (Seven) Days. 12 tablet 3    atorvastatin (LIPITOR) 40 MG tablet TAKE 1 TABLET BY MOUTH DAILY 90 tablet 1    budesonide-formoterol (SYMBICORT) 160-4.5 MCG/ACT inhaler INHALE TWO PUFFS BY MOUTH TWICE A DAY 10.2 g 5    Cholecalciferol (VITAMIN D3) 50 MCG (2000 UT) capsule Take 1 capsule by mouth Daily.      estradiol (ESTRACE VAGINAL) 0.1 MG/GM vaginal cream Apply blueberry sized amount to labia 2 times weekly 1 each 11    valsartan (DIOVAN) 160 MG tablet TAKE ONE TABLET BY MOUTH DAILY 90 tablet 3       Review of Systems   Constitutional:  Negative for chills and fever.   HENT:  Positive for postnasal drip and rhinorrhea.    Respiratory:  Negative for cough and shortness of breath.        Objective   There were no vitals filed for this visit.   Wt Readings from Last 3 Encounters:    04/12/24 62.6 kg (138 lb)   04/08/24 61.2 kg (135 lb)   07/20/23 63.4 kg (139 lb 12.8 oz)    There is no height or weight on file to calculate BMI.      Physical Exam  Constitutional:       Appearance: Normal appearance.   Pulmonary:      Effort: Pulmonary effort is normal.   Neurological:      Mental Status: She is alert.   Psychiatric:         Mood and Affect: Mood normal.         Behavior: Behavior normal.         Thought Content: Thought content normal.         Judgment: Judgment normal.           Problems Addressed this Visit    None  Visit Diagnoses       Seasonal allergic rhinitis due to pollen    -  Primary          Diagnoses         Codes Comments    Seasonal allergic rhinitis due to pollen    -  Primary ICD-10-CM: J30.1  ICD-9-CM: 477.0           Assessment & Plan   Video visit today.  She has right nostril obstructive symptoms and greenish discharge for about 7 weeks now.  Claritin-D without success.  I am going to have her stop in for an office visit so we can do a nasal exam.  She could have a nasal polyp or otherwise nasal pathology.  Alternatively this may be a right-sided sinus infection.  It is weird that she is having no symptoms on the left side which would be a point against seasonal allergies.  Will make further recommendations after we do a face-to-face exam today.  If not improving over the next week or 2 we will consider a round of Augmentin 875 twice daily for 10 days possible sinusitis.  Tenantrex platform used for the visit today.    She later came into the office and nasal exam is unremarkable other than congestion on the right side.  I see no polyp or obstruction.  Hard to even appreciate septal deviation.  Will begin with a Kane pot for nasal rinses along with some Flonase following the rinses.  Twice daily.    The above information was reviewed again today 04/16/24.  It continues to be accurate as reflected above and is unchanged.  History, physical and review of systems all reviewed  and are unchanged.  Medications were reviewed today and continue the current dosing.             Dragon disclaimer:   Part of this note may be an electronic transcription/translation of spoken language to printed text using the Dragon Dictation System.

## 2024-04-17 LAB
CYTOLOGIST CVX/VAG CYTO: NORMAL
CYTOLOGY CVX/VAG DOC CYTO: NORMAL
CYTOLOGY CVX/VAG DOC THIN PREP: NORMAL
DX ICD CODE: NORMAL
HPV I/H RISK 4 DNA CVX QL PROBE+SIG AMP: NEGATIVE
Lab: NORMAL
OTHER STN SPEC: NORMAL
STAT OF ADQ CVX/VAG CYTO-IMP: NORMAL

## 2024-04-19 DIAGNOSIS — N60.99 ATYPICAL DUCTAL HYPERPLASIA, BREAST: Primary | ICD-10-CM

## 2024-04-29 ENCOUNTER — TELEPHONE (OUTPATIENT)
Dept: INTERNAL MEDICINE | Facility: CLINIC | Age: 59
End: 2024-04-29
Payer: COMMERCIAL

## 2024-04-29 DIAGNOSIS — J30.1 SEASONAL ALLERGIC RHINITIS DUE TO POLLEN: Primary | ICD-10-CM

## 2024-04-29 RX ORDER — AMOXICILLIN AND CLAVULANATE POTASSIUM 875; 125 MG/1; MG/1
1 TABLET, FILM COATED ORAL 2 TIMES DAILY
Qty: 20 TABLET | Refills: 0 | Status: SHIPPED | OUTPATIENT
Start: 2024-04-29

## 2024-04-29 NOTE — TELEPHONE ENCOUNTER
Caller: Carolin Kauffman    Relationship: Self    Best call back number: [phone]    What is the best time to reach you: ANYTIME     Who are you requesting to speak with (clinical staff, provider,  specific staff member): CLINICAL STAFF     Do you know the name of the person who called: SELF     What was the call regarding: PATIENT CALLED AND STATED THE FLONASE IS NOT WORKING PLEASE CALL IN ANTIBIOTIC AS YOU SUGGESTED TO MARY AT [facility].    Is it okay if the provider responds through MyChart: YES

## 2024-05-09 ENCOUNTER — CONSULT (OUTPATIENT)
Dept: ONCOLOGY | Facility: CLINIC | Age: 59
End: 2024-05-09
Payer: COMMERCIAL

## 2024-05-09 ENCOUNTER — LAB (OUTPATIENT)
Dept: LAB | Facility: HOSPITAL | Age: 59
End: 2024-05-09
Payer: COMMERCIAL

## 2024-05-09 VITALS
DIASTOLIC BLOOD PRESSURE: 82 MMHG | TEMPERATURE: 97.8 F | SYSTOLIC BLOOD PRESSURE: 130 MMHG | HEART RATE: 75 BPM | HEIGHT: 62 IN | WEIGHT: 139.1 LBS | BODY MASS INDEX: 25.6 KG/M2 | OXYGEN SATURATION: 98 % | RESPIRATION RATE: 18 BRPM

## 2024-05-09 DIAGNOSIS — C50.919 MALIGNANT NEOPLASM OF FEMALE BREAST, UNSPECIFIED ESTROGEN RECEPTOR STATUS, UNSPECIFIED LATERALITY, UNSPECIFIED SITE OF BREAST: Primary | ICD-10-CM

## 2024-05-09 DIAGNOSIS — Z80.3 FAMILY HISTORY OF BREAST CANCER: Primary | ICD-10-CM

## 2024-05-09 LAB
BASOPHILS # BLD AUTO: 0.08 10*3/MM3 (ref 0–0.2)
BASOPHILS NFR BLD AUTO: 1 % (ref 0–1.5)
DEPRECATED RDW RBC AUTO: 38.3 FL (ref 37–54)
EOSINOPHIL # BLD AUTO: 0.97 10*3/MM3 (ref 0–0.4)
EOSINOPHIL NFR BLD AUTO: 12 % (ref 0.3–6.2)
ERYTHROCYTE [DISTWIDTH] IN BLOOD BY AUTOMATED COUNT: 12.4 % (ref 12.3–15.4)
HCT VFR BLD AUTO: 41 % (ref 34–46.6)
HGB BLD-MCNC: 14.3 G/DL (ref 12–15.9)
IMM GRANULOCYTES # BLD AUTO: 0.12 10*3/MM3 (ref 0–0.05)
IMM GRANULOCYTES NFR BLD AUTO: 1.5 % (ref 0–0.5)
LYMPHOCYTES # BLD AUTO: 2.27 10*3/MM3 (ref 0.7–3.1)
LYMPHOCYTES NFR BLD AUTO: 28 % (ref 19.6–45.3)
MCH RBC QN AUTO: 29.7 PG (ref 26.6–33)
MCHC RBC AUTO-ENTMCNC: 34.9 G/DL (ref 31.5–35.7)
MCV RBC AUTO: 85.2 FL (ref 79–97)
MONOCYTES # BLD AUTO: 0.87 10*3/MM3 (ref 0.1–0.9)
MONOCYTES NFR BLD AUTO: 10.7 % (ref 5–12)
NEUTROPHILS NFR BLD AUTO: 3.8 10*3/MM3 (ref 1.7–7)
NEUTROPHILS NFR BLD AUTO: 46.8 % (ref 42.7–76)
NRBC BLD AUTO-RTO: 0 /100 WBC (ref 0–0.2)
PLATELET # BLD AUTO: 274 10*3/MM3 (ref 140–450)
PMV BLD AUTO: 10.5 FL (ref 6–12)
RBC # BLD AUTO: 4.81 10*6/MM3 (ref 3.77–5.28)
WBC NRBC COR # BLD AUTO: 8.11 10*3/MM3 (ref 3.4–10.8)

## 2024-05-09 PROCEDURE — 85025 COMPLETE CBC W/AUTO DIFF WBC: CPT

## 2024-05-09 PROCEDURE — 36415 COLL VENOUS BLD VENIPUNCTURE: CPT

## 2024-06-05 ENCOUNTER — TELEPHONE (OUTPATIENT)
Dept: ONCOLOGY | Facility: CLINIC | Age: 59
End: 2024-06-05
Payer: COMMERCIAL

## 2024-06-20 ENCOUNTER — OFFICE VISIT (OUTPATIENT)
Dept: ONCOLOGY | Facility: CLINIC | Age: 59
End: 2024-06-20
Payer: COMMERCIAL

## 2024-06-20 ENCOUNTER — LAB (OUTPATIENT)
Dept: LAB | Facility: HOSPITAL | Age: 59
End: 2024-06-20
Payer: COMMERCIAL

## 2024-06-20 VITALS
RESPIRATION RATE: 18 BRPM | HEIGHT: 62 IN | TEMPERATURE: 98 F | DIASTOLIC BLOOD PRESSURE: 78 MMHG | OXYGEN SATURATION: 96 % | BODY MASS INDEX: 25.3 KG/M2 | SYSTOLIC BLOOD PRESSURE: 123 MMHG | WEIGHT: 137.5 LBS | HEART RATE: 70 BPM

## 2024-06-20 DIAGNOSIS — Z80.3 FAMILY HISTORY OF BREAST CANCER: Primary | ICD-10-CM

## 2024-06-20 DIAGNOSIS — N60.99 ATYPICAL DUCTAL HYPERPLASIA OF BREAST: ICD-10-CM

## 2024-06-20 DIAGNOSIS — Z80.3 FAMILY HISTORY OF BREAST CANCER: ICD-10-CM

## 2024-06-20 LAB
ALBUMIN SERPL-MCNC: 4.3 G/DL (ref 3.5–5.2)
ALBUMIN/GLOB SERPL: 1.5 G/DL
ALP SERPL-CCNC: 87 U/L (ref 39–117)
ALT SERPL W P-5'-P-CCNC: 18 U/L (ref 1–33)
ANION GAP SERPL CALCULATED.3IONS-SCNC: 12 MMOL/L (ref 5–15)
AST SERPL-CCNC: 25 U/L (ref 1–32)
BASOPHILS # BLD AUTO: 0.06 10*3/MM3 (ref 0–0.2)
BASOPHILS NFR BLD AUTO: 0.9 % (ref 0–1.5)
BILIRUB SERPL-MCNC: 0.4 MG/DL (ref 0–1.2)
BUN SERPL-MCNC: 18 MG/DL (ref 8–23)
BUN/CREAT SERPL: 21.2 (ref 7–25)
CALCIUM SPEC-SCNC: 9.5 MG/DL (ref 8.6–10.5)
CHLORIDE SERPL-SCNC: 102 MMOL/L (ref 98–107)
CO2 SERPL-SCNC: 24 MMOL/L (ref 22–29)
CREAT SERPL-MCNC: 0.85 MG/DL (ref 0.57–1)
DEPRECATED RDW RBC AUTO: 41.1 FL (ref 37–54)
EGFRCR SERPLBLD CKD-EPI 2021: 78.5 ML/MIN/1.73
EOSINOPHIL # BLD AUTO: 0.76 10*3/MM3 (ref 0–0.4)
EOSINOPHIL NFR BLD AUTO: 11.1 % (ref 0.3–6.2)
ERYTHROCYTE [DISTWIDTH] IN BLOOD BY AUTOMATED COUNT: 12.7 % (ref 12.3–15.4)
GLOBULIN UR ELPH-MCNC: 2.9 GM/DL
GLUCOSE SERPL-MCNC: 97 MG/DL (ref 65–99)
HCT VFR BLD AUTO: 39.8 % (ref 34–46.6)
HGB BLD-MCNC: 13.6 G/DL (ref 12–15.9)
IMM GRANULOCYTES # BLD AUTO: 0.03 10*3/MM3 (ref 0–0.05)
IMM GRANULOCYTES NFR BLD AUTO: 0.4 % (ref 0–0.5)
LYMPHOCYTES # BLD AUTO: 2.16 10*3/MM3 (ref 0.7–3.1)
LYMPHOCYTES NFR BLD AUTO: 31.6 % (ref 19.6–45.3)
MCH RBC QN AUTO: 30.2 PG (ref 26.6–33)
MCHC RBC AUTO-ENTMCNC: 34.2 G/DL (ref 31.5–35.7)
MCV RBC AUTO: 88.4 FL (ref 79–97)
MONOCYTES # BLD AUTO: 0.76 10*3/MM3 (ref 0.1–0.9)
MONOCYTES NFR BLD AUTO: 11.1 % (ref 5–12)
NEUTROPHILS NFR BLD AUTO: 3.07 10*3/MM3 (ref 1.7–7)
NEUTROPHILS NFR BLD AUTO: 44.9 % (ref 42.7–76)
NRBC BLD AUTO-RTO: 0 /100 WBC (ref 0–0.2)
PLATELET # BLD AUTO: 253 10*3/MM3 (ref 140–450)
PMV BLD AUTO: 10.5 FL (ref 6–12)
POTASSIUM SERPL-SCNC: 3.8 MMOL/L (ref 3.5–5.2)
PROT SERPL-MCNC: 7.2 G/DL (ref 6–8.5)
RBC # BLD AUTO: 4.5 10*6/MM3 (ref 3.77–5.28)
SODIUM SERPL-SCNC: 138 MMOL/L (ref 136–145)
WBC NRBC COR # BLD AUTO: 6.84 10*3/MM3 (ref 3.4–10.8)

## 2024-06-20 PROCEDURE — 85025 COMPLETE CBC W/AUTO DIFF WBC: CPT

## 2024-06-20 PROCEDURE — 99214 OFFICE O/P EST MOD 30 MIN: CPT | Performed by: INTERNAL MEDICINE

## 2024-06-20 PROCEDURE — 36415 COLL VENOUS BLD VENIPUNCTURE: CPT

## 2024-06-20 PROCEDURE — 80053 COMPREHEN METABOLIC PANEL: CPT

## 2024-06-20 NOTE — PROGRESS NOTES
"  Subjective     REASON FOR follow-up:   High risk for breast cancer  Left breast biopsy at 6 o'clock position on May 11, 2006 shows  Final Diagnosis   \"LEFT BREAST BIOPSY  6 O' CLOCK\":        MULTI- FOCAL DUCT HYPERPLASIA FOCALLY ATYPICAL.        BACKGROUND OF FIBROCYSTIC CHANGES WITH SCLEROSING ADENOSIS, APOCRINE              METAPLASIA, AND COLUMNAR CELL CHANGE.        MICROCALCIFICATIONS PRESENT.       HISTORY OF PRESENT ILLNESS:    Patient is a 59-year-old female who had a left breast biopsy in 2006 which was consistent with atypical ductal hyperplasia focally positive and she was referred here by Dr. Bipin irene for consideration of chemoprophylaxis for high risk for breast cancer.  Patient tells me that she does not have any family history of breast cancer.  She did have breast biopsy in 2006 which showed focal atypical duct hyperplasia.  We had a lengthy discussion about chemoprophylaxis with the patient.  Her Tyrer-Cuzick risk was greater than 20% and as a result we discussed consideration of MRI of the breast alternating with mammogram.  We also discussed tamoxifen 5 mg daily for 3 years versus raloxifene.  Given it was very minimal focal atypical ductal hyperplasia and no family history and given the side effects patient was concerned about taking any chemoprophylaxis.    We did refer her to Dr. Lopez to follow-up in the high-risk clinic.  Patient has appointment in his clinic on July 24, 2024.    Patient also has osteoporosis for which she is on alendronate.  Last mammogram was April 12, 2024 and currently the screening mammogram is negative.        Atypical ductal hyperplasia history:    Patient is a 59-year-old female who has been referred here by her OB/GYN Dr. Bipin lang for evaluation of high risk breast cancer.  She has had previous 2 biopsies on the breast 1 biopsy on the right and 1 on the left many years ago.  1 biopsy was in May 2006 on the left breast 6 o'clock position which showed " multifocal duct hyperplasia focally atypical.  She had a second biopsy the result of which I do not have .  I did the Maria Elena risk model and her 5-year 5-year risk of developing breast cancer is 4.6% compared to the average risk of 1.8%.  Lifetime risk is 21.8% compared to the 9.1%.    Patient had menstrual period starting at age 11, menopause in her 50s.  She is  2 para 2 no miscarriages and she had first child born at age 26.  She has an aunt who had breast cancer in her 40s.  But her mother and siblings have not had any breast cancer.    Her family history is consistent with prostate cancer in her dad in the late 60s and a paternal uncle.  Maternal grandmother had melanoma.  On calculating Tyrer-Cuzick, her lifetime risk is around 20% and patient is scheduled for MRI of the breast by Dr. Bipin Rios     We did discuss about consideration of chemoprophylaxis with tamoxifen versus raloxifene.  Patient is not too keen on medications.  She understands the side effects of tamoxifen in length and prefers not to take medications.  Certainly she can alternate mammogram with MRI of the breast.  I told her that if she does not want to consider any medications we can refer her to the high-risk breast clinic with Dr. Lopez and she is willing to consider that.      Past Medical History:   Diagnosis Date    Arthritis     Asthma     Cataract two years ago-right eye    Family history of coronary artery disease in mother 2017    H/O Breast calcification, left     High cholesterol     History of medical problems currentely treating for ulnar nerve entrapment    History of snoring     Hypertension     Irregular menstrual bleeding     Menometrorrhagia     Ovarian cyst     Ulnar nerve abnormality     Visual impairment nearsightedness        Past Surgical History:   Procedure Laterality Date    BREAST BIOPSY Left     CATARACT EXTRACTION  2017     SECTION       SECTION       SECTION       COLONOSCOPY  09/11/2015    KATHY Parmar MD    [facility] HYSTEROSCOPY          Current Outpatient Medications on File Prior to Visit   Medication Sig Dispense Refill    albuterol sulfate  (90 Base) MCG/ACT inhaler INHALE TWO PUFFS BY MOUTH TWICE A DAY 18 g 5    alendronate (Fosamax) 70 MG tablet Take 1 tablet by mouth Every 7 (Seven) Days. 12 tablet 3    atorvastatin (LIPITOR) 40 MG tablet TAKE 1 TABLET BY MOUTH DAILY 90 tablet 1    budesonide-formoterol (SYMBICORT) 160-4.5 MCG/ACT inhaler INHALE TWO PUFFS BY MOUTH TWICE A DAY 10.2 g 5    Cholecalciferol (VITAMIN D3) 50 MCG (2000 UT) capsule Take 1 capsule by mouth Daily.      estradiol (ESTRACE VAGINAL) 0.1 MG/GM vaginal cream Apply blueberry sized amount to labia 2 times weekly 1 each 11    valsartan (DIOVAN) 160 MG tablet TAKE ONE TABLET BY MOUTH DAILY 90 tablet 3     No current facility-administered medications on file prior to visit.        ALLERGIES:    Allergies   Allergen Reactions    Peanut Oil Anaphylaxis     Palm oil wheezing and throat closure    Palm Oil [Hydrogenated Palm Oil Glycerides] Rash        Social History     Socioeconomic History    Marital status:      Spouse name: Lane    Number of children: 3   Tobacco Use    Smoking status: Never     Passive exposure: Never    Smokeless tobacco: Never   Vaping Use    Vaping status: Never Used   Substance and Sexual Activity    Alcohol use: Yes     Alcohol/week: 2.0 standard drinks of alcohol     Types: 2 Glasses of wine per week     Comment: Caffeine use 3 cups daily    Drug use: No    Sexual activity: Yes     Partners: Male     Birth control/protection: Post-menopausal        Family History   Problem Relation Age of Onset    Hypertension Mother     Osteoporosis Mother         compression fractures    Heart attack Mother 70        Second heart Attack 2017    Stroke Mother     Hyperlipidemia Mother     Heart disease Mother     Dementia Mother     Hypertension Father     Atrial fibrillation  "Father     Prostate cancer Father     Hearing loss Father     Cancer Father         prostate    Melanoma Father     Parkinsonism Father     No Known Problems Daughter     Diabetes Son         Type 1    No Known Problems Son     Prostate cancer Paternal Uncle     Prostate cancer Paternal Uncle     Melanoma Maternal Grandmother         Review of Systems   Constitutional:  Negative for appetite change, chills, diaphoresis, fatigue, fever and unexpected weight change.   HENT:  Negative for hearing loss, sore throat and trouble swallowing.    Respiratory:  Negative for cough, chest tightness, shortness of breath and wheezing.    Cardiovascular:  Negative for chest pain, palpitations and leg swelling.   Gastrointestinal:  Negative for abdominal distention, abdominal pain, constipation, diarrhea, nausea and vomiting.   Genitourinary:  Negative for dysuria, frequency, hematuria and urgency.   Musculoskeletal:  Negative for joint swelling.        No muscle weakness.   Skin:  Negative for rash and wound.   Neurological:  Negative for seizures, syncope, speech difficulty, weakness, numbness and headaches.   Hematological:  Negative for adenopathy. Does not bruise/bleed easily.   Psychiatric/Behavioral:  Negative for behavioral problems, confusion and suicidal ideas.         Objective     Vitals:    06/20/24 1557   BP: 123/78   Pulse: 70   Resp: 18   Temp: 98 °F (36.7 °C)   TempSrc: Oral   SpO2: 96%   Weight: 62.4 kg (137 lb 8 oz)   Height: 157.5 cm (62.01\")   PainSc: 0-No pain           6/20/2024     3:52 PM   Current Status   ECOG score 0       Physical Exam      CONSTITUTIONAL:  Vital signs reviewed.  No distress, looks comfortable.  RESPIRATORY:  Normal respiratory effort.  Lungs clear to auscultation bilaterally.  CARDIOVASCULAR:  Normal S1, S2.  No murmurs rubs or gallops.  No significant lower extremity edema.  BREAST: Right breast: No skin changes, no evidence of breast mass, no nipple discharge, no evidence of any " "right axillary adenopathy or right supraclavicular adenopathy  Left breast: No evidence of any skin changes, no evidence of any left breast mass and no evidence of left nipple discharge as well as no left axillary adenopathy or left supraclavicular adenopathy.   GASTROINTESTINAL: Abdomen appears unremarkable.  Nontender.  No hepatomegaly.  No splenomegaly.  LYMPHATIC:  No cervical, supraclavicular, axillary lymphadenopathy.  SKIN:  Warm.  No rashes.  PSYCHIATRIC:  Normal judgment and insight.  Normal mood and affect.    RECENT LABS:  Hematology WBC   Date Value Ref Range Status   06/20/2024 6.84 3.40 - 10.80 10*3/mm3 Final   07/20/2022 4.6 3.4 - 10.8 x10E3/uL Final     RBC   Date Value Ref Range Status   06/20/2024 4.50 3.77 - 5.28 10*6/mm3 Final   07/20/2022 4.65 3.77 - 5.28 x10E6/uL Final     Hemoglobin   Date Value Ref Range Status   06/20/2024 13.6 12.0 - 15.9 g/dL Final     Hematocrit   Date Value Ref Range Status   06/20/2024 39.8 34.0 - 46.6 % Final     Platelets   Date Value Ref Range Status   06/20/2024 253 140 - 450 10*3/mm3 Final          Assessment & Plan     #.  High risk for breast cancer  Patient is a 59-year-old female who has been referred here by her OB/GYN Dr. Bipin lang for evaluation of high risk breast cancer.  She has had previous 2 biopsies on the breast 1 biopsy on the right and 1 on the left many years ago.  1 biopsy was in May 2006 on the left breast 6 o'clock position which showed multifocal duct hyperplasia focally atypical.  She had a second biopsy the result of which I do not have .  I did the Maria Elena risk model and her 5-year 5-year risk of developing breast cancer is 4.6% compared to the average risk of 1.8%.  Lifetime risk is 21.8% compared to the 9.1%.  LEFT BREAST BIOPSY  6 O' CLOCK\": This was done in 2006.       MULTI- FOCAL DUCT HYPERPLASIA FOCALLY ATYPICAL.        BACKGROUND OF FIBROCYSTIC CHANGES WITH SCLEROSING ADENOSIS, APOCRINE              METAPLASIA, AND COLUMNAR CELL CHANGE. "        MICROCALCIFICATIONS PRESENT.   Do not have any path report for the right breast biopsy.  Reviewed mammogram from 2024 and it is negative postsurgical scars are present in both breasts and radiology is recommending MRI of the breast    Patient had menstrual period starting at age 11, menopause in her 50s.  She is  2 para 2 no miscarriages and she had first child born at age 26.  She has an aunt who had breast cancer in her 40s.  But her mother and siblings have not had any breast cancer.    Her family history is consistent with prostate cancer in her dad in the late 60s and a paternal uncle.  Maternal grandmother had melanoma.  On calculating Tyrer-Cuzick, her lifetime risk is around 20% and patient is scheduled for MRI of the breast by Dr. Bipin Rios     We did discuss about consideration of chemoprophylaxis with tamoxifen versus raloxifene.  Patient is not too keen on medications.  She understands the side effects of tamoxifen in length and prefers not to take medications.  Certainly she can alternate mammogram with MRI of the breast.  I told her that if she does not want to consider any medications we can refer her to the high-risk breast clinic with Dr. Lopez and she is willing to consider that.  2024: Patient is currently doing well.  She had DEXA scan on 2024 which shows osteoporosis and screening mammogram 2024 which is negative.  Given patient prefers not to go on chemoprophylaxis we will refer her to the high-risk breast clinic with Dr. Lopez    #.  Vaginal dryness, patient placed on estradiol 0.1 mg/g vaginal cream but patient does not use it.  Discussed with patient that estrogen scan certainly have systemic absorption even though there local and cannot certain their affect on developing new breast cancers    Plan  Reviewed mammogram from 2024 which is negative  Reviewed pathology of the previous breast biopsy in  which shows  multifocal ductal hyperplasia focal atypical  The patient states she has no family history of breast cancer  Patient discussed with her dad and initially had thought her maternal aunt had breast cancer but that is not true.  Given history of atypical ductal hyperplasia we had discussed tamoxifen 5 mg daily for 3 years versus raloxifene but patient given side effects would prefer to follow with MRI alternating with mammogram  Reviewed screening mammogram from April 2024 which is negative  Patient will be followed in the high-risk breast clinic with Dr. Lopez, has appointment July 24, 2024 with   Will release her from our office      I spent 30 total minutes, face-to-face, caring for Carolin today. Greater than 50% of this time involved counseling and/or coordination of care as documented within this note.    MD Dr. Bipin Rodriguez Dr.

## 2024-07-22 RX ORDER — ATORVASTATIN CALCIUM 40 MG/1
40 TABLET, FILM COATED ORAL DAILY
Qty: 30 TABLET | Refills: 0 | Status: SHIPPED | OUTPATIENT
Start: 2024-07-22

## 2024-07-23 DIAGNOSIS — I10 ESSENTIAL HYPERTENSION: Primary | ICD-10-CM

## 2024-07-23 DIAGNOSIS — E78.00 HIGH CHOLESTEROL: ICD-10-CM

## 2024-07-23 DIAGNOSIS — E55.9 VITAMIN D DEFICIENCY: ICD-10-CM

## 2024-07-24 ENCOUNTER — CLINICAL SUPPORT (OUTPATIENT)
Dept: GENETICS | Facility: HOSPITAL | Age: 59
End: 2024-07-24
Payer: COMMERCIAL

## 2024-07-24 ENCOUNTER — LAB (OUTPATIENT)
Dept: LAB | Facility: HOSPITAL | Age: 59
End: 2024-07-24
Payer: COMMERCIAL

## 2024-07-24 ENCOUNTER — OFFICE VISIT (OUTPATIENT)
Dept: MAMMOGRAPHY | Facility: CLINIC | Age: 59
End: 2024-07-24
Payer: COMMERCIAL

## 2024-07-24 VITALS
SYSTOLIC BLOOD PRESSURE: 141 MMHG | DIASTOLIC BLOOD PRESSURE: 80 MMHG | WEIGHT: 135 LBS | HEIGHT: 61 IN | OXYGEN SATURATION: 97 % | BODY MASS INDEX: 25.49 KG/M2 | HEART RATE: 66 BPM

## 2024-07-24 DIAGNOSIS — Z91.89 AT HIGH RISK FOR BREAST CANCER: Primary | ICD-10-CM

## 2024-07-24 DIAGNOSIS — Z80.42 FAMILY HISTORY OF PROSTATE CANCER: ICD-10-CM

## 2024-07-24 DIAGNOSIS — Z13.79 GENETIC TESTING: Primary | ICD-10-CM

## 2024-07-24 DIAGNOSIS — N60.99 ATYPICAL DUCTAL HYPERPLASIA OF BREAST: ICD-10-CM

## 2024-07-24 DIAGNOSIS — Z80.8 FAMILY HISTORY OF MELANOMA: ICD-10-CM

## 2024-07-24 LAB
25(OH)D3 SERPL-MCNC: 34.8 NG/ML (ref 30–100)
ALBUMIN SERPL-MCNC: 4.4 G/DL (ref 3.5–5.2)
ALBUMIN/GLOB SERPL: 1.7 G/DL
ALP SERPL-CCNC: 73 U/L (ref 39–117)
ALT SERPL W P-5'-P-CCNC: 19 U/L (ref 1–33)
ANION GAP SERPL CALCULATED.3IONS-SCNC: 11 MMOL/L (ref 5–15)
AST SERPL-CCNC: 22 U/L (ref 1–32)
BASOPHILS # BLD AUTO: 0.05 10*3/MM3 (ref 0–0.2)
BASOPHILS NFR BLD AUTO: 0.9 % (ref 0–1.5)
BILIRUB SERPL-MCNC: 0.6 MG/DL (ref 0–1.2)
BUN SERPL-MCNC: 16 MG/DL (ref 8–23)
BUN/CREAT SERPL: 16.2 (ref 7–25)
CALCIUM SPEC-SCNC: 9.7 MG/DL (ref 8.6–10.5)
CHLORIDE SERPL-SCNC: 102 MMOL/L (ref 98–107)
CHOLEST SERPL-MCNC: 167 MG/DL (ref 0–200)
CO2 SERPL-SCNC: 25 MMOL/L (ref 22–29)
CREAT SERPL-MCNC: 0.99 MG/DL (ref 0.57–1)
DEPRECATED RDW RBC AUTO: 41.6 FL (ref 37–54)
EGFRCR SERPLBLD CKD-EPI 2021: 65.4 ML/MIN/1.73
EOSINOPHIL # BLD AUTO: 0.55 10*3/MM3 (ref 0–0.4)
EOSINOPHIL NFR BLD AUTO: 9.6 % (ref 0.3–6.2)
ERYTHROCYTE [DISTWIDTH] IN BLOOD BY AUTOMATED COUNT: 12.7 % (ref 12.3–15.4)
GLOBULIN UR ELPH-MCNC: 2.6 GM/DL
GLUCOSE SERPL-MCNC: 89 MG/DL (ref 65–99)
HCT VFR BLD AUTO: 43.3 % (ref 34–46.6)
HDLC SERPL QL: 2.42
HDLC SERPL-MCNC: 69 MG/DL (ref 40–60)
HGB BLD-MCNC: 14.1 G/DL (ref 12–15.9)
IMM GRANULOCYTES # BLD AUTO: 0.02 10*3/MM3 (ref 0–0.05)
IMM GRANULOCYTES NFR BLD AUTO: 0.3 % (ref 0–0.5)
LDLC SERPL CALC-MCNC: 84 MG/DL (ref 0–100)
LYMPHOCYTES # BLD AUTO: 1.88 10*3/MM3 (ref 0.7–3.1)
LYMPHOCYTES NFR BLD AUTO: 32.9 % (ref 19.6–45.3)
MCH RBC QN AUTO: 29.2 PG (ref 26.6–33)
MCHC RBC AUTO-ENTMCNC: 32.6 G/DL (ref 31.5–35.7)
MCV RBC AUTO: 89.6 FL (ref 79–97)
MONOCYTES # BLD AUTO: 0.63 10*3/MM3 (ref 0.1–0.9)
MONOCYTES NFR BLD AUTO: 11 % (ref 5–12)
NEUTROPHILS NFR BLD AUTO: 2.59 10*3/MM3 (ref 1.7–7)
NEUTROPHILS NFR BLD AUTO: 45.3 % (ref 42.7–76)
NRBC BLD AUTO-RTO: 0 /100 WBC (ref 0–0.2)
PLATELET # BLD AUTO: 257 10*3/MM3 (ref 140–450)
PMV BLD AUTO: 10.7 FL (ref 6–12)
POTASSIUM SERPL-SCNC: 4.7 MMOL/L (ref 3.5–5.2)
PROT SERPL-MCNC: 7 G/DL (ref 6–8.5)
RBC # BLD AUTO: 4.83 10*6/MM3 (ref 3.77–5.28)
SODIUM SERPL-SCNC: 138 MMOL/L (ref 136–145)
TRIGL SERPL-MCNC: 77 MG/DL (ref 0–150)
VLDLC SERPL-MCNC: 14 MG/DL (ref 5–40)
WBC NRBC COR # BLD AUTO: 5.72 10*3/MM3 (ref 3.4–10.8)

## 2024-07-24 PROCEDURE — 80053 COMPREHEN METABOLIC PANEL: CPT | Performed by: INTERNAL MEDICINE

## 2024-07-24 PROCEDURE — 82306 VITAMIN D 25 HYDROXY: CPT | Performed by: INTERNAL MEDICINE

## 2024-07-24 PROCEDURE — 85025 COMPLETE CBC W/AUTO DIFF WBC: CPT | Performed by: INTERNAL MEDICINE

## 2024-07-24 PROCEDURE — 36415 COLL VENOUS BLD VENIPUNCTURE: CPT | Performed by: INTERNAL MEDICINE

## 2024-07-24 PROCEDURE — 80061 LIPID PANEL: CPT | Performed by: INTERNAL MEDICINE

## 2024-07-24 PROCEDURE — 96040: CPT | Performed by: GENETIC COUNSELOR, MS

## 2024-07-24 PROCEDURE — 99204 OFFICE O/P NEW MOD 45 MIN: CPT | Performed by: SURGERY

## 2024-07-24 NOTE — PROGRESS NOTES
Carolin Kauffman, a 59-year-old female, was referred for genetic counseling due to a family history of prostate cancer. Ms. Kauffman has no personal history of cancer. She was 11 years old at menarche and had her first child at age 26. She is postmenopausal and retains her uterus and ovaries. Ms. Kauffman's most recent mammogram was in April and showed heterogeneously dense breast tissue but was otherwise normal. She does have a history of atypical ductal hyperplasia that was diagnosed in 2010. She has been referred to have a breast MRI and then will be alternating every 6 months between a mammogram and breast MRI. Ms. Kauffman had a colonoscopy 9 years ago and reports no history of polyps. She was interested in discussing her risk for a hereditary cancer syndrome. Ms. Kauffman decided to pursue comprehensive genetic testing to evaluate her risk of cancer, therefore the Common Hereditary Cancers Panel was ordered through [employer] which analyzes 48 genes associated with an increased cancer risk. Results are expected in 2-3 weeks.     PERTINENT FAMILY HISTORY: (See attached pedigree)   Father:   Prostate cancer     Melanoma  Pat Uncle 1:  Prostate cancer  Pat Uncle 2:  Prostate cancer  Mat Grandmother: Melanoma    We do not have medical records regarding any of these diagnoses.       RISK ASSESSMENT:  Ms. Kauffman's family history of breast cancer raises the question of a hereditary cancer syndrome. NCCN guidelines for genetic testing for BRCA1/2 states that individuals with 3 or more close relatives with prostate cancer at any age may consider genetic testing. With Ms. Kauffman's father and two paternal uncles' diagnoses, she would clearly meet this criteria. This risk assessment is based on the family history information provided at the time of the appointment. The assessment could change in the future should new information be obtained.    GENETIC COUNSELING (30 minutes):  We reviewed the family history information in  detail. Cases of cancer follow three general patterns: sporadic, familial, and hereditary. While most cancer is sporadic, some cases appear to occur in family clusters. These cases are said to be familial and account for 10-20% of cancer cases. Familial cases may be due to a combination of shared genes and environmental factors among family members. In even fewer cases, the risk for cancer is inherited, and the genes responsible for the increased cancer risk are known.       Family histories typical of hereditary cancer syndromes usually include multiple first- and second-degree relatives diagnosed with cancer types that define a syndrome. These cases tend to be diagnosed at younger-than-expected ages and can be bilateral or multifocal. The cancer in these families follows an autosomal dominant inheritance pattern, which indicates the likely presence of a mutation in a cancer susceptibility gene. Children and siblings of an individual believed to carry this mutation have a 50% chance of inheriting that mutation, thereby inheriting the increased risk to develop cancer. These mutations can be passed down from the maternal or the paternal lineage.     Based on Ms. Kauffman's family history of prostate cancer, we discussed the BRCA1 and BRCA2 genes. Mutations in these genes confer an increased risk for breast cancer, ovarian cancer, male breast cancer, prostate cancer, and pancreatic cancer. There are other genes that have been associated with prostate cancer, including CHEK2, DARIO, HOXB13, as well as the Gambino syndrome genes. The standard approach to genetic testing is via a multigene panel. Genes included on these panels have varying degrees of risk associated, and management and screening guidelines vary based on the specific gene. Hereditary cancer syndromes can demonstrate incomplete penetrance and variable expression within families.  There are other hereditary cancer syndromes as well. Based on Ms. Kauffman's family  history and her desire to get more information regarding her personal risks, testing was pursued through a multigene panel evaluating several other genes known to increase the risk for cancer.    GENETIC TESTING:  The risks, benefits, and limitations of genetic testing and implications for clinical management following testing were reviewed. DNA test results can influence decisions regarding screening and prevention. Genetic testing can have significant psychological implications for both individuals and families. Also discussed was the possibility of employment and insurance discrimination based on genetic test results and the laws in place to prevent this, as well as the limitations of these laws.       We discussed panel testing, which would involve testing 48 genes associated with increased cancer risk. The implications of a positive or negative test result were discussed. We also discussed the importance of testing an affected relative and how a negative result for Ms. Kauffman wouldn't necessarily mean the cancers presenting in her family weren't due to a genetic mutation that Ms. Kauffman did not inherit. In general, a negative genetic test result is most informative if a mutation has first been established in an affected member of the family. In cases where an affected individual is not available or interested in testing, it is appropriate to offer testing to an unaffected individual. We discussed the possibility that, in some cases, genetic test results may be ambiguous due to the identification of a genetic variant. These variants may or may not be associated with an increased cancer risk. With multigene panel testing, it is not uncommon for a variant of uncertain significance (VUS) to be identified. If a VUS is identified, testing family members is typically not recommended and screening recommendations are made based on the family history. The laboratories that perform genetic testing work to reclassify  the VUS and send out an amended report if and when a VUS is reclassified. The majority of variant findings are ultimately reclassified to a negative result. Given Ms. Kauffman's family history, a negative test result does not eliminate all cancer risk, although the risk would not be as high as it would with positive genetic testing.     PLAN: Genetic testing was ordered via the Common Hereditary Cancers Panel through [facility]. She provided a buccal sample today. Results are expected in 2-3 weeks and will be called out to Ms. Kauffman. If she has any questions in the meantime, she is welcome to call me at [phone].      Ronit Molina MS, [facility] [place]  Licensed Certified Genetic Counselor

## 2024-07-24 NOTE — PROGRESS NOTES
Chief Complaint: Carolin Kauffman is a 59 y.o.. female here today for Consult        History of Present Illness:  Patient presents with management of breast cancer risk.   She is a nice 59-year-old white female noted to be at high risk for developing breast cancer.  Her past history is significant and that she has had a biopsy in both breast.  In 2010 she had a breast biopsy which revealed some focal atypical duct hyperplasia.  Since that time she has done pretty well with no significant imaging abnormalities and no further biopsies.    There is no family history for breast cancer but she does have a father and 2 paternal uncles with prostate cancer.    The patient has not palpated anything of concern.    Her last mammograms were performed in April 2024.  I have personally reviewed those imaging studies along with the reports.  She does have markers denoting the scar in each breast.  She has some postsurgical changes beneath them.  There are no worrisome calcifications, masses, or areas of architectural distortion.  The breast tissue is heterogeneously dense.    Review of Systems:  Review of Systems   Skin:         The patient denies any noticeable changes to the skin of the breast.   All other systems reviewed and are negative.     I have reviewed the ROS as documented by the MA/LPN/RN Wilmar Lopez MD      Past Medical and Surgical History:  Breast Biopsy History:  Patient has had the following breast biopsies:2  Breast Cancer HIstory:  Patient does not have a past medical history of breast cancer.  Breast Operations, and year:  0  Social History     Tobacco Use   Smoking Status Never    Passive exposure: Never   Smokeless Tobacco Never     Obstetric History:  Patient is postmenopausal, entered menopause naturally at age: 50   Number of pregnancies:3  Number of live births: 3  Number of abortions or miscarriages: 0  Age of delivery of first child: 26  Patient breast fed, for the following lenth of time:18  months  Length of time taking birth control pills:no  Patient has never taken hormone replacement    Past Surgical History:   Procedure Laterality Date    BREAST BIOPSY Left     CATARACT EXTRACTION  2017     SECTION       SECTION       SECTION      COLONOSCOPY  2015    KATHY Parmar MD    D & C HYSTEROSCOPY         Past Medical History:   Diagnosis Date    Arthritis     Asthma     Cataract two years ago-right eye    Family history of coronary artery disease in mother 2017    H/O Breast calcification, left     High cholesterol     History of medical problems currentely treating for ulnar nerve entrapment    History of snoring     Hypertension     Irregular menstrual bleeding     Menometrorrhagia     Ovarian cyst     Ulnar nerve abnormality     Visual impairment nearsightedness       Prior Hospitalizations, other than for surgery or childbirth, and year:  None    Social History:  Patient is .  Patient has 2 sons. And 1 Daughter    Family History:  Family History   Problem Relation Age of Onset    Hypertension Mother     Osteoporosis Mother         compression fractures    Heart attack Mother 70        Second heart Attack 2017    Stroke Mother     Hyperlipidemia Mother     Heart disease Mother     Dementia Mother     Hypertension Father     Atrial fibrillation Father     Prostate cancer Father     Hearing loss Father     Cancer Father         prostate    Melanoma Father     Parkinsonism Father     No Known Problems Daughter     Diabetes Son         Type 1    No Known Problems Son     Prostate cancer Paternal Uncle     Prostate cancer Paternal Uncle     Melanoma Maternal Grandmother        Vital Signs:  Vitals:    24 1457   BP: 141/80   Pulse: 66   SpO2: 97%       Medications:    Current Outpatient Prescriptions:     Current Outpatient Medications:     albuterol sulfate  (90 Base) MCG/ACT inhaler, INHALE TWO PUFFS BY MOUTH TWICE A DAY, Disp: 18 g,  Rfl: 5    alendronate (Fosamax) 70 MG tablet, Take 1 tablet by mouth Every 7 (Seven) Days., Disp: 12 tablet, Rfl: 3    atorvastatin (LIPITOR) 40 MG tablet, TAKE 1 TABLET BY MOUTH DAILY, Disp: 30 tablet, Rfl: 0    budesonide-formoterol (SYMBICORT) 160-4.5 MCG/ACT inhaler, INHALE TWO PUFFS BY MOUTH TWICE A DAY, Disp: 10.2 g, Rfl: 5    Cholecalciferol (VITAMIN D3) 50 MCG (2000 UT) capsule, Take 1 capsule by mouth Daily., Disp: , Rfl:     estradiol (ESTRACE VAGINAL) 0.1 MG/GM vaginal cream, Apply blueberry sized amount to labia 2 times weekly, Disp: 1 each, Rfl: 11    valsartan (DIOVAN) 160 MG tablet, TAKE ONE TABLET BY MOUTH DAILY, Disp: 90 tablet, Rfl: 3    Physical Examination:  General Appearance:   Patient is in no distress.  She is well kept and has a BMI of 25.5.  Psychiatric:  Patient with appropriate mood and affect. Alert and oriented to self, time, and place.    Breast, RIGHT:  medium sized, symmetric with the contralateral side.  Breast skin is without erythema, edema, rashes.  She has a radial scar in the upper part of the breast with some keloid formation.  There are no visible abnormalities upon inspection during the arm-raising maneuver or with hands on hips in the sitting position. There is no nipple retraction, discharge or nipple/areolar skin changes.There are no masses palpable in the sitting or supine positions.    Breast, LEFT:  medium sized, symmetric with the contralateral side.  Breast skin is without erythema, edema, rashes.  There is a radial scar in the upper part of the breast which has formed a mild keloid scar.  There are no visible abnormalities upon inspection during the arm-raising maneuver or with hands on hips in the sitting position. There is no nipple retraction, discharge or nipple/areolar skin changes.There are no masses palpable in the sitting or supine positions.    Lymphatic:  There is no axillary, cervical, infraclavicular, or supraclavicular adenopathy  bilaterally.    Gastrointestinal:  Abdomen is soft, nondistended, and nontender.  There was no obvious hepatosplenomegaly or abdominal mass.  There are  scars from previous  surgery.    Musculoskeletal:  Good strength in all 4 extremities.   There is good range of motion in both shoulders.        Assessment:  1. At high risk for breast cancer    2. Atypical ductal hyperplasia of breast    The patient is aware that high risk breast cancer patients are typically identified because of a strong family history for breast cancer, genetic mutation, atypical hyperplasia, LCIS, or history of radiation to the chest wall under the age of 30.  The patient does meet criteria for genetic testing based on her strong prostate cancer family history.  She was interested in genetic testing and referred there today.  Our risk assessment models of estimated her lifetime risk between 34 and 38%.  Her 5-year risk is estimated at 4.6%.  The patient has already seen Dr. Burr to consider chemoprevention.  She is not a good candidate for aromatase inhibitors because of her decrease in bone density.  There was some consideration given to tamoxifen or raloxifene but it was felt that she would likely be fine without taking chemoprevention and that is what the patient prefers.    She is also aware of the benefits of exercise, maintaining an ideal body weight, and limiting alcohol intake.      Plan:  1.  The patient already has orders in place for an MRI and I will follow-up on that and call her.  2.  The patient was sent for genetic testing today.  3.  I would like to see her back in the office in roughly 13 months so that my visits will be  from her visits with Dr. Donald by approximately 6 months.      CPT coding:    Next Appointment:  No follow-ups on file.            EMR Dragon/transcription disclaimer:    Much of this encounter note is an electronic transcription/translocation of spoken language to printed text.  The  electronic translation of spoken language may permit erroneous, or at times, nonsensical words or phrases to be inadvertently transcribed.  Although I have reviewed the note from such areas, some may still exist.

## 2024-07-24 NOTE — LETTER
July 24, 2024     Bipin Donald MD  [address]  [address]  [facility] [address]    Patient: Carolin Kauffman   YOB: 1965   Date of Visit: 7/24/2024     Dear Bipin Donald MD:       Thank you for referring Carolin Kauffman to me for evaluation. Below are the relevant portions of my assessment and plan of care.    Assessment:  1. At high risk for breast cancer    2. Atypical ductal hyperplasia of breast    The patient is aware that high risk breast cancer patients are typically identified because of a strong family history for breast cancer, genetic mutation, atypical hyperplasia, LCIS, or history of radiation to the chest wall under the age of 30.  The patient does meet criteria for genetic testing based on her strong prostate cancer family history.  She was interested in genetic testing and referred there today.  Our risk assessment models of estimated her lifetime risk between 34 and 38%.  Her 5-year risk is estimated at 4.6%.  The patient has already seen Dr. Burr to consider chemoprevention.  She is not a good candidate for aromatase inhibitors because of her decrease in bone density.  There was some consideration given to tamoxifen or raloxifene but it was felt that she would likely be fine without taking chemoprevention and that is what the patient prefers.    She is also aware of the benefits of exercise, maintaining an ideal body weight, and limiting alcohol intake.      Plan:  1.  The patient already has orders in place for an MRI and I will follow-up on that and call her.  2.  The patient was sent for genetic testing today.  3.  I would like to see her back in the office in roughly 13 months so that my visits will be  from her visits with Dr. Donald by approximately 6 months.    If you have questions, please do not hesitate to call me. I look forward to following Carolin along with you.         Sincerely,        Wilmar Lopez MD        CC: MD John Phelps,  Wilmar OLMSTEAD MD  07/24/24 1530  Sign when Signing Visit  Chief Complaint: Carolin Kauffman is a 59 y.o.. female here today for Consult        History of Present Illness:  Patient presents with management of breast cancer risk.   She is a nice 59-year-old white female noted to be at high risk for developing breast cancer.  Her past history is significant and that she has had a biopsy in both breast.  In 2010 she had a breast biopsy which revealed some focal atypical duct hyperplasia.  Since that time she has done pretty well with no significant imaging abnormalities and no further biopsies.    There is no family history for breast cancer but she does have a father and 2 paternal uncles with prostate cancer.    The patient has not palpated anything of concern.    Her last mammograms were performed in April 2024.  I have personally reviewed those imaging studies along with the reports.  She does have markers denoting the scar in each breast.  She has some postsurgical changes beneath them.  There are no worrisome calcifications, masses, or areas of architectural distortion.  The breast tissue is heterogeneously dense.    Review of Systems:  Review of Systems   Skin:         The patient denies any noticeable changes to the skin of the breast.   All other systems reviewed and are negative.     I have reviewed the ROS as documented by the MA/LPN/RN Wilmar Lopez MD      Past Medical and Surgical History:  Breast Biopsy History:  Patient has had the following breast biopsies:2  Breast Cancer HIstory:  Patient does not have a past medical history of breast cancer.  Breast Operations, and year:  0  Social History     Tobacco Use   Smoking Status Never   • Passive exposure: Never   Smokeless Tobacco Never     Obstetric History:  Patient is postmenopausal, entered menopause naturally at age: 50   Number of pregnancies:3  Number of live births: 3  Number of abortions or miscarriages: 0  Age of delivery of first child:  26  Patient breast fed, for the following lenth of time:18 months  Length of time taking birth control pills:no  Patient has never taken hormone replacement    Past Surgical History:   Procedure Laterality Date   • BREAST BIOPSY Left    • CATARACT EXTRACTION     •  SECTION     •  SECTION     •  SECTION     • COLONOSCOPY  2015    KATHY Parmar MD   • D & C HYSTEROSCOPY         Past Medical History:   Diagnosis Date   • Arthritis    • Asthma    • Cataract two years ago-right eye   • Family history of coronary artery disease in mother 2017   • H/O Breast calcification, left    • High cholesterol    • History of medical problems currentely treating for ulnar nerve entrapment   • History of snoring    • Hypertension    • Irregular menstrual bleeding    • Menometrorrhagia    • Ovarian cyst    • Ulnar nerve abnormality    • Visual impairment nearsightedness       Prior Hospitalizations, other than for surgery or childbirth, and year:  None    Social History:  Patient is .  Patient has 2 sons. And 1 Daughter    Family History:  Family History   Problem Relation Age of Onset   • Hypertension Mother    • Osteoporosis Mother         compression fractures   • Heart attack Mother 70        Second heart Attack    • Stroke Mother    • Hyperlipidemia Mother    • Heart disease Mother    • Dementia Mother    • Hypertension Father    • Atrial fibrillation Father    • Prostate cancer Father    • Hearing loss Father    • Cancer Father         prostate   • Melanoma Father    • Parkinsonism Father    • No Known Problems Daughter    • Diabetes Son         Type 1   • No Known Problems Son    • Prostate cancer Paternal Uncle    • Prostate cancer Paternal Uncle    • Melanoma Maternal Grandmother        Vital Signs:  Vitals:    24 1457   BP: 141/80   Pulse: 66   SpO2: 97%       Medications:    Current Outpatient Prescriptions:     Current Outpatient Medications:   •   albuterol sulfate  (90 Base) MCG/ACT inhaler, INHALE TWO PUFFS BY MOUTH TWICE A DAY, Disp: 18 g, Rfl: 5  •  alendronate (Fosamax) 70 MG tablet, Take 1 tablet by mouth Every 7 (Seven) Days., Disp: 12 tablet, Rfl: 3  •  atorvastatin (LIPITOR) 40 MG tablet, TAKE 1 TABLET BY MOUTH DAILY, Disp: 30 tablet, Rfl: 0  •  budesonide-formoterol (SYMBICORT) 160-4.5 MCG/ACT inhaler, INHALE TWO PUFFS BY MOUTH TWICE A DAY, Disp: 10.2 g, Rfl: 5  •  Cholecalciferol (VITAMIN D3) 50 MCG (2000 UT) capsule, Take 1 capsule by mouth Daily., Disp: , Rfl:   •  estradiol (ESTRACE VAGINAL) 0.1 MG/GM vaginal cream, Apply blueberry sized amount to labia 2 times weekly, Disp: 1 each, Rfl: 11  •  valsartan (DIOVAN) 160 MG tablet, TAKE ONE TABLET BY MOUTH DAILY, Disp: 90 tablet, Rfl: 3    Physical Examination:  General Appearance:   Patient is in no distress.  She is well kept and has a BMI of 25.5.  Psychiatric:  Patient with appropriate mood and affect. Alert and oriented to self, time, and place.    Breast, RIGHT:  medium sized, symmetric with the contralateral side.  Breast skin is without erythema, edema, rashes.  She has a radial scar in the upper part of the breast with some keloid formation.  There are no visible abnormalities upon inspection during the arm-raising maneuver or with hands on hips in the sitting position. There is no nipple retraction, discharge or nipple/areolar skin changes.There are no masses palpable in the sitting or supine positions.    Breast, LEFT:  {ARHPEBREASTSIZE:91834} sized, symmetric with the contralateral side.  Breast skin is without erythema, edema, rashes.  There is a radial scar in the upper part of the breast which has formed a mild keloid scar.  There are no visible abnormalities upon inspection during the arm-raising maneuver or with hands on hips in the sitting position. There is no nipple retraction, discharge or nipple/areolar skin changes.There are no masses palpable in the sitting or supine  positions.    Lymphatic:  There is no axillary, cervical, infraclavicular, or supraclavicular adenopathy bilaterally.    Gastrointestinal:  Abdomen is soft, nondistended, and nontender.  There was no obvious hepatosplenomegaly or abdominal mass.  There are  scars from previous  surgery.    Musculoskeletal:  Good strength in all 4 extremities.   There is good range of motion in both shoulders.        Assessment:  1. At high risk for breast cancer    2. Atypical ductal hyperplasia of breast    The patient is aware that high risk breast cancer patients are typically identified because of a strong family history for breast cancer, genetic mutation, atypical hyperplasia, LCIS, or history of radiation to the chest wall under the age of 30.  The patient does meet criteria for genetic testing based on her strong prostate cancer family history.  She was interested in genetic testing and referred there today.  Our risk assessment models of estimated her lifetime risk between 34 and 38%.  Her 5-year risk is estimated at 4.6%.  The patient has already seen Dr. Burr to consider chemoprevention.  She is not a good candidate for aromatase inhibitors because of her decrease in bone density.  There was some consideration given to tamoxifen or raloxifene but it was felt that she would likely be fine without taking chemoprevention and that is what the patient prefers.    She is also aware of the benefits of exercise, maintaining an ideal body weight, and limiting alcohol intake.      Plan:  1.  The patient already has orders in place for an MRI and I will follow-up on that and call her.  2.  The patient was sent for genetic testing today.  3.  I would like to see her back in the office in roughly 13 months so that my visits will be  from her visits with Dr. Donald by approximately 6 months.      CPT coding:    Next Appointment:  No follow-ups on file.            EMR Dragon/transcription disclaimer:    Much of this  encounter note is an electronic transcription/translocation of spoken language to printed text.  The electronic translation of spoken language may permit erroneous, or at times, nonsensical words or phrases to be inadvertently transcribed.  Although I have reviewed the note from such areas, some may still exist.

## 2024-07-24 NOTE — LETTER
July 24, 2024     Bipin Donald MD  [address] [facility]  [address]  [facility] [address]    Patient: Carolin Kauffman   YOB: 1965   Date of Visit: 7/24/2024     Dear Bipin Donald MD:       Thank you for referring Carolin Kauffman to me for evaluation. Below are the relevant portions of my assessment and plan of care.  Assessment:  1. At high risk for breast cancer    2. Atypical ductal hyperplasia of breast    The patient is aware that high risk breast cancer patients are typically identified because of a strong family history for breast cancer, genetic mutation, atypical hyperplasia, LCIS, or history of radiation to the chest wall under the age of 30.  The patient does meet criteria for genetic testing based on her strong prostate cancer family history.  She was interested in genetic testing and referred there today.  Our risk assessment models of estimated her lifetime risk between 34 and 38%.  Her 5-year risk is estimated at 4.6%.  The patient has already seen Dr. Burr to consider chemoprevention.  She is not a good candidate for aromatase inhibitors because of her decrease in bone density.  There was some consideration given to tamoxifen or raloxifene but it was felt that she would likely be fine without taking chemoprevention and that is what the patient prefers.    She is also aware of the benefits of exercise, maintaining an ideal body weight, and limiting alcohol intake.      Plan:  1.  The patient already has orders in place for an MRI and I will follow-up on that and call her.  2.  The patient was sent for genetic testing today.  3.  I would like to see her back in the office in roughly 13 months so that my visits will be  from her visits with Dr. Donald by approximately 6 months.    If you have questions, please do not hesitate to call me. I look forward to following Carolin along with you.         Sincerely,        Wilmar Lopez MD        CC: MD John Phelps,  Wilmar OLMSTEAD MD  07/24/24 1532  Sign when Signing Visit  Chief Complaint: Carolin Kauffman is a 59 y.o.. female here today for Consult        History of Present Illness:  Patient presents with management of breast cancer risk.   She is a nice 59-year-old white female noted to be at high risk for developing breast cancer.  Her past history is significant and that she has had a biopsy in both breast.  In 2010 she had a breast biopsy which revealed some focal atypical duct hyperplasia.  Since that time she has done pretty well with no significant imaging abnormalities and no further biopsies.    There is no family history for breast cancer but she does have a father and 2 paternal uncles with prostate cancer.    The patient has not palpated anything of concern.    Her last mammograms were performed in April 2024.  I have personally reviewed those imaging studies along with the reports.  She does have markers denoting the scar in each breast.  She has some postsurgical changes beneath them.  There are no worrisome calcifications, masses, or areas of architectural distortion.  The breast tissue is heterogeneously dense.    Review of Systems:  Review of Systems   Skin:         The patient denies any noticeable changes to the skin of the breast.   All other systems reviewed and are negative.     I have reviewed the ROS as documented by the MA/LPN/RN Wilmar Lopez MD      Past Medical and Surgical History:  Breast Biopsy History:  Patient has had the following breast biopsies:2  Breast Cancer HIstory:  Patient does not have a past medical history of breast cancer.  Breast Operations, and year:  0  Social History     Tobacco Use   Smoking Status Never   • Passive exposure: Never   Smokeless Tobacco Never     Obstetric History:  Patient is postmenopausal, entered menopause naturally at age: 50   Number of pregnancies:3  Number of live births: 3  Number of abortions or miscarriages: 0  Age of delivery of first child:  26  Patient breast fed, for the following lenth of time:18 months  Length of time taking birth control pills:no  Patient has never taken hormone replacement    Past Surgical History:   Procedure Laterality Date   • BREAST BIOPSY Left    • CATARACT EXTRACTION     •  SECTION     •  SECTION     •  SECTION     • COLONOSCOPY  2015    KATHY Parmar MD   • D & C HYSTEROSCOPY         Past Medical History:   Diagnosis Date   • Arthritis    • Asthma    • Cataract two years ago-right eye   • Family history of coronary artery disease in mother 2017   • H/O Breast calcification, left    • High cholesterol    • History of medical problems currentely treating for ulnar nerve entrapment   • History of snoring    • Hypertension    • Irregular menstrual bleeding    • Menometrorrhagia    • Ovarian cyst    • Ulnar nerve abnormality    • Visual impairment nearsightedness       Prior Hospitalizations, other than for surgery or childbirth, and year:  None    Social History:  Patient is .  Patient has 2 sons. And 1 Daughter    Family History:  Family History   Problem Relation Age of Onset   • Hypertension Mother    • Osteoporosis Mother         compression fractures   • Heart attack Mother 70        Second heart Attack    • Stroke Mother    • Hyperlipidemia Mother    • Heart disease Mother    • Dementia Mother    • Hypertension Father    • Atrial fibrillation Father    • Prostate cancer Father    • Hearing loss Father    • Cancer Father         prostate   • Melanoma Father    • Parkinsonism Father    • No Known Problems Daughter    • Diabetes Son         Type 1   • No Known Problems Son    • Prostate cancer Paternal Uncle    • Prostate cancer Paternal Uncle    • Melanoma Maternal Grandmother        Vital Signs:  Vitals:    24 1457   BP: 141/80   Pulse: 66   SpO2: 97%       Medications:    Current Outpatient Prescriptions:     Current Outpatient Medications:   •   albuterol sulfate  (90 Base) MCG/ACT inhaler, INHALE TWO PUFFS BY MOUTH TWICE A DAY, Disp: 18 g, Rfl: 5  •  alendronate (Fosamax) 70 MG tablet, Take 1 tablet by mouth Every 7 (Seven) Days., Disp: 12 tablet, Rfl: 3  •  atorvastatin (LIPITOR) 40 MG tablet, TAKE 1 TABLET BY MOUTH DAILY, Disp: 30 tablet, Rfl: 0  •  budesonide-formoterol (SYMBICORT) 160-4.5 MCG/ACT inhaler, INHALE TWO PUFFS BY MOUTH TWICE A DAY, Disp: 10.2 g, Rfl: 5  •  Cholecalciferol (VITAMIN D3) 50 MCG (2000 UT) capsule, Take 1 capsule by mouth Daily., Disp: , Rfl:   •  estradiol (ESTRACE VAGINAL) 0.1 MG/GM vaginal cream, Apply blueberry sized amount to labia 2 times weekly, Disp: 1 each, Rfl: 11  •  valsartan (DIOVAN) 160 MG tablet, TAKE ONE TABLET BY MOUTH DAILY, Disp: 90 tablet, Rfl: 3    Physical Examination:  General Appearance:   Patient is in no distress.  She is well kept and has a BMI of 25.5.  Psychiatric:  Patient with appropriate mood and affect. Alert and oriented to self, time, and place.    Breast, RIGHT:  medium sized, symmetric with the contralateral side.  Breast skin is without erythema, edema, rashes.  She has a radial scar in the upper part of the breast with some keloid formation.  There are no visible abnormalities upon inspection during the arm-raising maneuver or with hands on hips in the sitting position. There is no nipple retraction, discharge or nipple/areolar skin changes.There are no masses palpable in the sitting or supine positions.    Breast, LEFT:  medium sized, symmetric with the contralateral side.  Breast skin is without erythema, edema, rashes.  There is a radial scar in the upper part of the breast which has formed a mild keloid scar.  There are no visible abnormalities upon inspection during the arm-raising maneuver or with hands on hips in the sitting position. There is no nipple retraction, discharge or nipple/areolar skin changes.There are no masses palpable in the sitting or supine  positions.    Lymphatic:  There is no axillary, cervical, infraclavicular, or supraclavicular adenopathy bilaterally.    Gastrointestinal:  Abdomen is soft, nondistended, and nontender.  There was no obvious hepatosplenomegaly or abdominal mass.  There are  scars from previous  surgery.    Musculoskeletal:  Good strength in all 4 extremities.   There is good range of motion in both shoulders.        Assessment:  1. At high risk for breast cancer    2. Atypical ductal hyperplasia of breast    The patient is aware that high risk breast cancer patients are typically identified because of a strong family history for breast cancer, genetic mutation, atypical hyperplasia, LCIS, or history of radiation to the chest wall under the age of 30.  The patient does meet criteria for genetic testing based on her strong prostate cancer family history.  She was interested in genetic testing and referred there today.  Our risk assessment models of estimated her lifetime risk between 34 and 38%.  Her 5-year risk is estimated at 4.6%.  The patient has already seen Dr. Burr to consider chemoprevention.  She is not a good candidate for aromatase inhibitors because of her decrease in bone density.  There was some consideration given to tamoxifen or raloxifene but it was felt that she would likely be fine without taking chemoprevention and that is what the patient prefers.    She is also aware of the benefits of exercise, maintaining an ideal body weight, and limiting alcohol intake.      Plan:  1.  The patient already has orders in place for an MRI and I will follow-up on that and call her.  2.  The patient was sent for genetic testing today.  3.  I would like to see her back in the office in roughly 13 months so that my visits will be  from her visits with Dr. Donald by approximately 6 months.      CPT coding:    Next Appointment:  No follow-ups on file.            EMR Dragon/transcription disclaimer:    Much of this  encounter note is an electronic transcription/translocation of spoken language to printed text.  The electronic translation of spoken language may permit erroneous, or at times, nonsensical words or phrases to be inadvertently transcribed.  Although I have reviewed the note from such areas, some may still exist.

## 2024-07-24 NOTE — LETTER
July 24, 2024     Bipin Donald MD  [address] [facility]  [address]  [facility] [address]    Patient: Carolin Kauffman   YOB: 1965   Date of Visit: 7/24/2024     Dear Bipin Donald MD:       Thank you for referring Carolin Kauffman to me for evaluation. Below are the relevant portions of my assessment and plan of care.    If you have questions, please do not hesitate to call me. I look forward to following Carolin along with you.         Sincerely,        Wilmar Lopez MD        CC: MD John Phelps William P, MD  07/24/24 1530  Sign when Signing Visit  Chief Complaint: Carolin Kauffman is a 59 y.o.. female here today for Consult        History of Present Illness:  Patient presents with management of breast cancer risk.   She is a nice 59-year-old white female noted to be at high risk for developing breast cancer.  Her past history is significant and that she has had a biopsy in both breast.  In 2010 she had a breast biopsy which revealed some focal atypical duct hyperplasia.  Since that time she has done pretty well with no significant imaging abnormalities and no further biopsies.    There is no family history for breast cancer but she does have a father and 2 paternal uncles with prostate cancer.    The patient has not palpated anything of concern.    Her last mammograms were performed in April 2024.  I have personally reviewed those imaging studies along with the reports.  She does have markers denoting the scar in each breast.  She has some postsurgical changes beneath them.  There are no worrisome calcifications, masses, or areas of architectural distortion.  The breast tissue is heterogeneously dense.    Review of Systems:  Review of Systems   Skin:         The patient denies any noticeable changes to the skin of the breast.   All other systems reviewed and are negative.     I have reviewed the ROS as documented by the MA/LPN/RN Wilmar Lopez MD      Past Medical and  Surgical History:  Breast Biopsy History:  Patient has had the following breast biopsies:2  Breast Cancer HIstory:  Patient does not have a past medical history of breast cancer.  Breast Operations, and year:  0  Social History     Tobacco Use   Smoking Status Never    Passive exposure: Never   Smokeless Tobacco Never     Obstetric History:  Patient is postmenopausal, entered menopause naturally at age: 50   Number of pregnancies:3  Number of live births: 3  Number of abortions or miscarriages: 0  Age of delivery of first child: 26  Patient breast fed, for the following lenth of time:18 months  Length of time taking birth control pills:no  Patient has never taken hormone replacement    Past Surgical History:   Procedure Laterality Date    BREAST BIOPSY Left     CATARACT EXTRACTION       SECTION       SECTION       SECTION      COLONOSCOPY  2015    KATHY Parmar MD    D & C HYSTEROSCOPY         Past Medical History:   Diagnosis Date    Arthritis     Asthma     Cataract two years ago-right eye    Family history of coronary artery disease in mother 2017    H/O Breast calcification, left     High cholesterol     History of medical problems currentely treating for ulnar nerve entrapment    History of snoring     Hypertension     Irregular menstrual bleeding     Menometrorrhagia     Ovarian cyst     Ulnar nerve abnormality     Visual impairment nearsightedness       Prior Hospitalizations, other than for surgery or childbirth, and year:  None    Social History:  Patient is .  Patient has 2 sons. And 1 Daughter    Family History:  Family History   Problem Relation Age of Onset    Hypertension Mother     Osteoporosis Mother         compression fractures    Heart attack Mother 70        Second heart Attack 2017    Stroke Mother     Hyperlipidemia Mother     Heart disease Mother     Dementia Mother     Hypertension Father     Atrial fibrillation Father      Prostate cancer Father     Hearing loss Father     Cancer Father         prostate    Melanoma Father     Parkinsonism Father     No Known Problems Daughter     Diabetes Son         Type 1    No Known Problems Son     Prostate cancer Paternal Uncle     Prostate cancer Paternal Uncle     Melanoma Maternal Grandmother        Vital Signs:  Vitals:    07/24/24 1457   BP: 141/80   Pulse: 66   SpO2: 97%       Medications:    Current Outpatient Prescriptions:     Current Outpatient Medications:     albuterol sulfate  (90 Base) MCG/ACT inhaler, INHALE TWO PUFFS BY MOUTH TWICE A DAY, Disp: 18 g, Rfl: 5    alendronate (Fosamax) 70 MG tablet, Take 1 tablet by mouth Every 7 (Seven) Days., Disp: 12 tablet, Rfl: 3    atorvastatin (LIPITOR) 40 MG tablet, TAKE 1 TABLET BY MOUTH DAILY, Disp: 30 tablet, Rfl: 0    budesonide-formoterol (SYMBICORT) 160-4.5 MCG/ACT inhaler, INHALE TWO PUFFS BY MOUTH TWICE A DAY, Disp: 10.2 g, Rfl: 5    Cholecalciferol (VITAMIN D3) 50 MCG (2000 UT) capsule, Take 1 capsule by mouth Daily., Disp: , Rfl:     estradiol (ESTRACE VAGINAL) 0.1 MG/GM vaginal cream, Apply blueberry sized amount to labia 2 times weekly, Disp: 1 each, Rfl: 11    valsartan (DIOVAN) 160 MG tablet, TAKE ONE TABLET BY MOUTH DAILY, Disp: 90 tablet, Rfl: 3    Physical Examination:  General Appearance:   Patient is in no distress.  She is well kept and has a BMI of 25.5.  Psychiatric:  Patient with appropriate mood and affect. Alert and oriented to self, time, and place.    Breast, RIGHT:  medium sized, symmetric with the contralateral side.  Breast skin is without erythema, edema, rashes.  She has a radial scar in the upper part of the breast with some keloid formation.  There are no visible abnormalities upon inspection during the arm-raising maneuver or with hands on hips in the sitting position. There is no nipple retraction, discharge or nipple/areolar skin changes.There are no masses palpable in the sitting or supine  positions.    Breast, LEFT:  {ARHPEBREASTSIZE:88084} sized, symmetric with the contralateral side.  Breast skin is without erythema, edema, rashes.  There is a radial scar in the upper part of the breast which has formed a mild keloid scar.  There are no visible abnormalities upon inspection during the arm-raising maneuver or with hands on hips in the sitting position. There is no nipple retraction, discharge or nipple/areolar skin changes.There are no masses palpable in the sitting or supine positions.    Lymphatic:  There is no axillary, cervical, infraclavicular, or supraclavicular adenopathy bilaterally.    Gastrointestinal:  Abdomen is soft, nondistended, and nontender.  There was no obvious hepatosplenomegaly or abdominal mass.  There are  scars from previous  surgery.    Musculoskeletal:  Good strength in all 4 extremities.   There is good range of motion in both shoulders.        Assessment:  1. At high risk for breast cancer    2. Atypical ductal hyperplasia of breast    The patient is aware that high risk breast cancer patients are typically identified because of a strong family history for breast cancer, genetic mutation, atypical hyperplasia, LCIS, or history of radiation to the chest wall under the age of 30.  The patient does meet criteria for genetic testing based on her strong prostate cancer family history.  She was interested in genetic testing and referred there today.  Our risk assessment models of estimated her lifetime risk between 34 and 38%.  Her 5-year risk is estimated at 4.6%.  The patient has already seen Dr. Burr to consider chemoprevention.  She is not a good candidate for aromatase inhibitors because of her decrease in bone density.  There was some consideration given to tamoxifen or raloxifene but it was felt that she would likely be fine without taking chemoprevention and that is what the patient prefers.    She is also aware of the benefits of exercise, maintaining an  ideal body weight, and limiting alcohol intake.      Plan:  1.  The patient already has orders in place for an MRI and I will follow-up on that and call her.  2.  The patient was sent for genetic testing today.  3.  I would like to see her back in the office in roughly 13 months so that my visits will be  from her visits with Dr. Donald by approximately 6 months.      CPT coding:    Next Appointment:  No follow-ups on file.            EMR Dragon/transcription disclaimer:    Much of this encounter note is an electronic transcription/translocation of spoken language to printed text.  The electronic translation of spoken language may permit erroneous, or at times, nonsensical words or phrases to be inadvertently transcribed.  Although I have reviewed the note from such areas, some may still exist.

## 2024-07-26 ENCOUNTER — OFFICE VISIT (OUTPATIENT)
Dept: INTERNAL MEDICINE | Facility: CLINIC | Age: 59
End: 2024-07-26
Payer: COMMERCIAL

## 2024-07-26 ENCOUNTER — PATIENT ROUNDING (BHMG ONLY) (OUTPATIENT)
Dept: MAMMOGRAPHY | Facility: CLINIC | Age: 59
End: 2024-07-26
Payer: COMMERCIAL

## 2024-07-26 VITALS
HEART RATE: 66 BPM | WEIGHT: 140 LBS | HEIGHT: 61 IN | OXYGEN SATURATION: 98 % | SYSTOLIC BLOOD PRESSURE: 110 MMHG | TEMPERATURE: 98.2 F | RESPIRATION RATE: 16 BRPM | DIASTOLIC BLOOD PRESSURE: 68 MMHG | BODY MASS INDEX: 26.43 KG/M2

## 2024-07-26 DIAGNOSIS — Z00.00 ENCOUNTER FOR PREVENTIVE HEALTH EXAMINATION: Primary | ICD-10-CM

## 2024-07-26 PROCEDURE — 99396 PREV VISIT EST AGE 40-64: CPT | Performed by: INTERNAL MEDICINE

## 2024-07-26 NOTE — PROGRESS NOTES
Subjective   Carolin Kauffman is a 59 y.o. female.     Chief Complaint   Patient presents with    Annual Exam    Hypertension    Hyperlipidemia         History of Present Illness  In for annual preventive exam.  Sleeps 7 hours per night.  Exercises 2 days/week.  Energy is good.  Diet is well-balanced.    Hypertension  This is a new problem. The current episode started more than 1 year ago. The problem is unchanged. Pertinent negatives include no chest pain, headaches, palpitations or shortness of breath. There are no associated agents to hypertension. Risk factors for coronary artery disease include family history and post-menopausal state. Past treatments include nothing. There is no history of angina, kidney disease, CAD/MI, CVA or heart failure.   Hyperlipidemia  Pertinent negatives include no chest pain, myalgias or shortness of breath.        The following portions of the patient's history were reviewed and updated as appropriate: allergies, current medications, past social history and problem list.    Outpatient Medications Marked as Taking for the 7/26/24 encounter (Office Visit) with J Carlos Dodson MD   Medication Sig Dispense Refill    albuterol sulfate  (90 Base) MCG/ACT inhaler INHALE TWO PUFFS BY MOUTH TWICE A DAY 18 g 5    alendronate (Fosamax) 70 MG tablet Take 1 tablet by mouth Every 7 (Seven) Days. 12 tablet 3    atorvastatin (LIPITOR) 40 MG tablet TAKE 1 TABLET BY MOUTH DAILY 30 tablet 0    budesonide-formoterol (SYMBICORT) 160-4.5 MCG/ACT inhaler INHALE TWO PUFFS BY MOUTH TWICE A DAY 10.2 g 5    Cholecalciferol (VITAMIN D3) 50 MCG (2000 UT) capsule Take 1 capsule by mouth Daily.      valsartan (DIOVAN) 160 MG tablet TAKE ONE TABLET BY MOUTH DAILY 90 tablet 3       Review of Systems   Constitutional:  Negative for appetite change, chills, diaphoresis, fatigue, fever and unexpected weight change.   Respiratory:  Positive for wheezing. Negative for cough, chest tightness and shortness of  breath.    Cardiovascular:  Negative for chest pain, palpitations and leg swelling.   Gastrointestinal:  Negative for abdominal pain, anal bleeding, blood in stool, constipation, diarrhea, nausea, rectal pain and vomiting.   Endocrine: Negative for cold intolerance, heat intolerance and polyuria.   Genitourinary:  Negative for difficulty urinating, dysuria, flank pain, frequency, hematuria and urgency.   Musculoskeletal:  Positive for back pain. Negative for arthralgias and myalgias.   Allergic/Immunologic: Negative for environmental allergies.   Neurological:  Negative for dizziness, syncope, speech difficulty, weakness, light-headedness, numbness and headaches.   Hematological:  Does not bruise/bleed easily.   Psychiatric/Behavioral:  Negative for agitation, confusion, dysphoric mood and sleep disturbance. The patient is not nervous/anxious.        Objective   Vitals:    07/26/24 0932   BP: 110/68   Pulse: 66   Resp: 16   Temp: 98.2 °F (36.8 °C)   SpO2: 98%      Wt Readings from Last 3 Encounters:   07/26/24 63.5 kg (140 lb)   07/24/24 61.2 kg (135 lb)   06/20/24 62.4 kg (137 lb 8 oz)    Body mass index is 26.45 kg/m².      Physical Exam   Constitutional: She is oriented to person, place, and time. She appears well-developed.   HENT:   Head: Normocephalic and atraumatic.   Right Ear: Tympanic membrane and external ear normal.   Left Ear: Tympanic membrane and external ear normal.   Nose: Nose normal.   Eyes: Pupils are equal, round, and reactive to light. Conjunctivae are normal.   Neck: No JVD present. No thyromegaly present.   Cardiovascular: Normal rate, regular rhythm and normal heart sounds. Exam reveals no gallop.   No murmur heard.  Pulmonary/Chest: Effort normal and breath sounds normal. No respiratory distress. She has no wheezes. She has no rales.   Abdominal: Soft. Normal appearance and bowel sounds are normal. She exhibits no distension and no mass. There is no abdominal tenderness. There is no  guarding. No hernia.   Musculoskeletal: Normal range of motion.   Lymphadenopathy:     She has no cervical adenopathy.   Neurological: She is alert and oriented to person, place, and time. She displays normal reflexes. No cranial nerve deficit. Coordination normal.   Skin: Skin is warm and dry.   Psychiatric: Her behavior is normal. Mood, judgment and thought content normal.   Nursing note and vitals reviewed.        Problems Addressed this Visit    None  Visit Diagnoses       Encounter for preventive health examination    -  Primary          Diagnoses         Codes Comments    Encounter for preventive health examination    -  Primary ICD-10-CM: Z00.00  ICD-9-CM: V70.0           Assessment & Plan   In for annual preventive exam today July 2024.  Overall health is excellent.  She has a history of lifelong asthma that is under good control.  Cholesterol runs little high but she has an excellent HDL cholesterol.  Coronary risk is very low.  Blood pressure is under excellent control.  She is due for annual lab work today which will include CBC, CMP, lipids, UA.  Vitamin D level today.  She worries about osteoporosis being on an inhaled steroid.  She is only taking 1 puff a day of Symbicort 160 3 days/week.  She has osteopenia on a recent DEXA scan and is now taking some additional calcium and vitamin D.  Follow-up preventive exam in 1 year.  She is due for colonoscopy screening in 9/11/2025.  Will follow-up in 6 months on hypertension and hyperlipidemia.  She is now in a high risk screening program for breast cancer although the major risk factor appears to be a family history of prostate cancer.  I think oncology is up in the ER about this.  She has declined the preventative medicine.    Prevention counseling was performed today. The counseling performed was routine health maintenance topics including BMI and exercise.    The above information was reviewed again today 07/26/24.  It continues to be accurate as reflected  above and is unchanged.  History, physical and review of systems all reviewed and are unchanged.  Medications were reviewed today and continue the current dosing.          The 10-year ASCVD risk score (Caterina MEYER, et al., 2019) is: 2.4%    Values used to calculate the score:      Age: 59 years      Sex: Female      Is Non- : No      Diabetic: No      Tobacco smoker: No      Systolic Blood Pressure: 110 mmHg      Is BP treated: Yes      HDL Cholesterol: 69 mg/dL      Total Cholesterol: 167 mg/dL           Dragon disclaimer:   Much of this encounter note is an electronic transcription/translation of spoken language to printed text. The electronic translation of spoken language may permit erroneous, or at times, nonsensical words or phrases to be inadvertently transcribed; Although I have reviewed the note for such errors, some may still exist.

## 2024-07-26 NOTE — PROGRESS NOTES
July 26, 2024    Hello, may I speak with Carolin Kauffman?    My name is ARIADNE         I am  with [facility]  [address] [address] [phone].    Before we get started may I verify your date of birth? 1965    I am calling to officially welcome you to our practice and ask about your recent visit. Is this a good time to talk? yes    Tell me about your visit with us. What things went well?   It all went ok.       We're always looking for ways to make our patients' experiences even better. Do you have recommendations on ways we may improve?  no    Overall were you satisfied with your first visit to our practice? yes       I appreciate you taking the time to speak with me today. Is there anything else I can do for you? no      Thank you, and have a great day.

## 2024-07-29 RX ORDER — ALBUTEROL SULFATE 90 UG/1
2 AEROSOL, METERED RESPIRATORY (INHALATION) 2 TIMES DAILY
Qty: 8.5 G | Refills: 2 | Status: SHIPPED | OUTPATIENT
Start: 2024-07-29

## 2024-08-01 ENCOUNTER — DOCUMENTATION (OUTPATIENT)
Dept: GENETICS | Facility: HOSPITAL | Age: 59
End: 2024-08-01
Payer: COMMERCIAL

## 2024-08-01 ENCOUNTER — TELEPHONE (OUTPATIENT)
Dept: GENETICS | Facility: HOSPITAL | Age: 59
End: 2024-08-01
Payer: COMMERCIAL

## 2024-08-01 NOTE — TELEPHONE ENCOUNTER
Attempted to contact MsElton Jamari regarding genetic test results. She did not answer. Left  asking for her to return my call.

## 2024-08-01 NOTE — PROGRESS NOTES
Carolin Kauffman, a 59-year-old female, was referred for genetic counseling due to a family history of prostate cancer. Ms. Kauffman has no personal history of cancer. She was 11 years old at menarche and had her first child at age 26. She is postmenopausal and retains her uterus and ovaries. Ms. Kauffman's most recent mammogram was in April and showed heterogeneously dense breast tissue but was otherwise normal. She does have a history of atypical ductal hyperplasia that was diagnosed in 2010. She has been referred to have a breast MRI and then will be alternating every 6 months between a mammogram and breast MRI. Ms. Kauffman had a colonoscopy 9 years ago and reports no history of polyps. She was interested in discussing her risk for a hereditary cancer syndrome. Ms. Kauffman decided to pursue comprehensive genetic testing to evaluate her risk of cancer, therefore the Common Hereditary Cancers Panel was ordered through [employer] which analyzes 48 genes associated with an increased cancer risk. Genetic testing was negative for pathogenic mutations in BRCA1/2 and 46 additional genes on this panel. These normal results were discussed with Ms. Kauffman by telephone on 8/1/24.     PERTINENT FAMILY HISTORY: (See attached pedigree)   Father:   Prostate cancer     Melanoma  Pat Uncle 1:  Prostate cancer  Pat Uncle 2:  Prostate cancer  Mat Grandmother: Melanoma    We do not have medical records regarding any of these diagnoses.       RISK ASSESSMENT:  Ms. Kauffman's family history of breast cancer raises the question of a hereditary cancer syndrome. NCCN guidelines for genetic testing for BRCA1/2 states that individuals with 3 or more close relatives with prostate cancer at any age may consider genetic testing. With Ms. Kauffman's father and two paternal uncles' diagnoses, she would clearly meet this criteria. This risk assessment is based on the family history information provided at the time of the appointment. The assessment  could change in the future should new information be obtained.    Because genetic testing was negative, her management should be guided by a family history-based risk assessment. Tyrer-Cuzick, version 8 is able to take into account personal factors (age at first menarche, age at first birth, etc.) and family history when calculating risk for breast cancer. Computer modeling estimates that Ms. Kauffman's lifetime personal risk for developing breast cancer is up to 33.3% (Marshaller-Thomaszick, v8), compared to the general population risk of 12.5%. A risk greater than 19% warrants consideration of increased screening for Ms. Kauffman per NCCN guidelines. This risk assessment is based on the family history information provided at the time of the appointment and could change in the future should new information be obtained.    GENETIC COUNSELING (30 minutes):  We reviewed the family history information in detail. Cases of cancer follow three general patterns: sporadic, familial, and hereditary. While most cancer is sporadic, some cases appear to occur in family clusters. These cases are said to be familial and account for 10-20% of cancer cases. Familial cases may be due to a combination of shared genes and environmental factors among family members. In even fewer cases, the risk for cancer is inherited, and the genes responsible for the increased cancer risk are known.       Family histories typical of hereditary cancer syndromes usually include multiple first- and second-degree relatives diagnosed with cancer types that define a syndrome. These cases tend to be diagnosed at younger-than-expected ages and can be bilateral or multifocal. The cancer in these families follows an autosomal dominant inheritance pattern, which indicates the likely presence of a mutation in a cancer susceptibility gene. Children and siblings of an individual believed to carry this mutation have a 50% chance of inheriting that mutation, thereby  inheriting the increased risk to develop cancer. These mutations can be passed down from the maternal or the paternal lineage.     Based on Ms. Kauffman's family history of prostate cancer, we discussed the BRCA1 and BRCA2 genes. Mutations in these genes confer an increased risk for breast cancer, ovarian cancer, male breast cancer, prostate cancer, and pancreatic cancer. There are other genes that have been associated with prostate cancer, including CHEK2, DARIO, HOXB13, as well as the Gambino syndrome genes. The standard approach to genetic testing is via a multigene panel. Genes included on these panels have varying degrees of risk associated, and management and screening guidelines vary based on the specific gene. Hereditary cancer syndromes can demonstrate incomplete penetrance and variable expression within families.  There are other hereditary cancer syndromes as well. Based on Ms. Kauffman's family history and her desire to get more information regarding her personal risks, testing was pursued through a multigene panel evaluating several other genes known to increase the risk for cancer.    GENETIC TESTING:  The risks, benefits, and limitations of genetic testing and implications for clinical management following testing were reviewed. DNA test results can influence decisions regarding screening and prevention. Genetic testing can have significant psychological implications for both individuals and families. Also discussed was the possibility of employment and insurance discrimination based on genetic test results and the laws in place to prevent this, as well as the limitations of these laws.       We discussed panel testing, which would involve testing 48 genes associated with increased cancer risk. The implications of a positive or negative test result were discussed. We also discussed the importance of testing an affected relative and how a negative result for Ms. Kauffman wouldn't necessarily mean the cancers  presenting in her family weren't due to a genetic mutation that Ms. Kauffman did not inherit. In general, a negative genetic test result is most informative if a mutation has first been established in an affected member of the family. In cases where an affected individual is not available or interested in testing, it is appropriate to offer testing to an unaffected individual. We discussed the possibility that, in some cases, genetic test results may be ambiguous due to the identification of a genetic variant. These variants may or may not be associated with an increased cancer risk. With multigene panel testing, it is not uncommon for a variant of uncertain significance (VUS) to be identified. If a VUS is identified, testing family members is typically not recommended and screening recommendations are made based on the family history. The laboratories that perform genetic testing work to reclassify the VUS and send out an amended report if and when a VUS is reclassified. The majority of variant findings are ultimately reclassified to a negative result. Given Ms. Kauffman's family history, a negative test result does not eliminate all cancer risk, although the risk would not be as high as it would with positive genetic testing.     TEST RESULTS: Genetic testing was negative for known pathogenic mutations by sequencing and rearrangement testing for the 48 genes on the Common Hereditary Cancers panel (see attached). This negative result greatly lowers, but does not eliminate, the risk of a hereditary cancer syndrome for Ms. Kauffman. It is possible that the family history is due to a hereditary cancer syndrome that Ms. Kauffman did not happen to inherit. This assessment is based on the information provided at the time of the consultation.     CLINICAL MANAGEMENT GUIDELINES: Options available to individuals with an increased lifetime risk (greater than 20%) for breast cancer was discussed, including increased surveillance  and chemoprevention. Given her family history, Ms. Kauffman is at an increased lifetime risk for breast cancer, which warrants increased surveillance in the future.     Increased surveillance, based on NCCN guidelines, would consist of semi-annual clinical breast exam and annual mammography starting 10 years prior to the earliest breast cancer diagnosis in the family, or age 40, whichever is earliest. According to an American Cancer Society expert panel and NCCN guidelines, annual breast MRI should be offered to women whose lifetime risk of breast cancer is 20-25 percent or more, typically beginning at the same age as mammography. Breast cancer chemoprevention is another option that can be considered in the future. Studies have shown that Tamoxifen and Raloxifene can cut the risk of estrogen receptor positive breast cancer by 50% when taken by high-risk women over a 5-year period. There are risks and side effects associated with these medications; therefore, the risks versus benefits must be considered prior to deciding to take chemopreventative medications. These assessments are based on the information provided at the time of consultation and could change should new information become available.    PLAN: Genetic counseling remains available to Ms. Kauffman. She is already being seen in the [facility] and plans to continue being seen there. If she has any questions in the meantime, she is welcome to call me at [phone].      Ronit Molina MS, [facility] [place]  Licensed Certified Genetic Counselor      Cc: Carolin Lopez MD

## 2024-08-12 ENCOUNTER — TELEMEDICINE (OUTPATIENT)
Dept: INTERNAL MEDICINE | Facility: CLINIC | Age: 59
End: 2024-08-12
Payer: COMMERCIAL

## 2024-08-12 DIAGNOSIS — R19.7 DIARRHEA, UNSPECIFIED TYPE: Primary | ICD-10-CM

## 2024-08-12 PROCEDURE — 99214 OFFICE O/P EST MOD 30 MIN: CPT | Performed by: INTERNAL MEDICINE

## 2024-08-12 NOTE — PROGRESS NOTES
Mode of Visit: Video  Location of patient: home  Location of provider: [facility] [place], [place] clinic  You have chosen to receive care through a telehealth visit.         Subjective   Carolin Kauffman is a 59 y.o. female.     Chief Complaint   Patient presents with    Diarrhea         History of Present Illness  In with 6 weeks of abnormal bowel movements.  They tend to be on the loose side.  Some mucus.  Some watery.  Nonbloody.       The following portions of the patient's history were reviewed and updated as appropriate: allergies, current medications, past social history and problem list.    Outpatient Medications Marked as Taking for the 8/12/24 encounter (Telemedicine) with J Carlos Dodson MD   Medication Sig Dispense Refill    albuterol sulfate  (90 Base) MCG/ACT inhaler INHALE 2 PUFFS BY MOUTH TWICE A DAY 8.5 g 2    alendronate (Fosamax) 70 MG tablet Take 1 tablet by mouth Every 7 (Seven) Days. 12 tablet 3    atorvastatin (LIPITOR) 40 MG tablet TAKE 1 TABLET BY MOUTH DAILY 30 tablet 0    budesonide-formoterol (SYMBICORT) 160-4.5 MCG/ACT inhaler INHALE TWO PUFFS BY MOUTH TWICE A DAY 10.2 g 5    Cholecalciferol (VITAMIN D3) 50 MCG (2000 UT) capsule Take 1 capsule by mouth Daily.      valsartan (DIOVAN) 160 MG tablet TAKE ONE TABLET BY MOUTH DAILY 90 tablet 3       Review of Systems   Constitutional:  Negative for chills and fever.   Gastrointestinal:  Positive for abdominal pain and diarrhea.       Objective   There were no vitals filed for this visit.   Wt Readings from Last 3 Encounters:   07/26/24 63.5 kg (140 lb)   07/24/24 61.2 kg (135 lb)   06/20/24 62.4 kg (137 lb 8 oz)    There is no height or weight on file to calculate BMI.      Physical Exam  Constitutional:       Appearance: Normal appearance.   Pulmonary:      Effort: Pulmonary effort is normal.   Neurological:      Mental Status: She is alert.   Psychiatric:         Mood and Affect: Mood normal.         Behavior: Behavior normal.         Thought Content:  Thought content normal.         Judgment: Judgment normal.           Problems Addressed this Visit    None  Visit Diagnoses       Diarrhea, unspecified type    -  Primary    Relevant Orders    Fecal Lactoferrin Qual. - Stool, Per Rectum    Stool Culture (Reference Lab) - Stool, Per Rectum    Clostridioides difficile Toxin, PCR - Stool, Per Rectum    Giardia / Cryptosporidium Screen - Stool, Per Rectum    Fecal Fat, Qualitative - Stool, Per Rectum          Diagnoses         Codes Comments    Diarrhea, unspecified type    -  Primary ICD-10-CM: R19.7  ICD-9-CM: 787.91           Assessment & Plan   Video visit today.  She has had 6 weeks of abnormal bowel movements.  Started with some diarrhea on and off.  Bowel movements just not formed correctly now.  Last some normal bowel movements been that will be interspersed with some mucousy bowel movements.  Occasionally water of a bowel movements.  May have been on cefdinir around the onset of this diarrhea illness.  She has had no abdominal pain or cramps.  No fever or chills.  Has been around no one else who has had diarrhea.  At this point we will run a diarrhea panel on her for further evaluation.  She will begin Pepto-Bismol 3 times daily and see if that helps.  Further recommendations to follow.  Likely a colonoscopy would be the next test for her.  Could also consider blood work for celiac disease at some point as well.  [employer] platform used for the visit today.    The above information was reviewed again today 08/12/24.  It continues to be accurate as reflected above and is unchanged.  History, physical and review of systems all reviewed and are unchanged.  Medications were reviewed today and continue the current dosing.               Dragon disclaimer:   Part of this note may be an electronic transcription/translation of spoken language to printed text using the Dragon Dictation System.

## 2024-08-16 ENCOUNTER — LAB (OUTPATIENT)
Dept: LAB | Facility: HOSPITAL | Age: 59
End: 2024-08-16
Payer: COMMERCIAL

## 2024-08-16 DIAGNOSIS — R19.7 DIARRHEA OF PRESUMED INFECTIOUS ORIGIN: Primary | ICD-10-CM

## 2024-08-16 LAB
C DIFF TOX GENS STL QL NAA+PROBE: NEGATIVE
FATTY ACIDS: NORMAL
LACTOFERRIN STL QL LA: POSITIVE
NEUTRAL FATS: NORMAL

## 2024-08-16 PROCEDURE — 87427 SHIGA-LIKE TOXIN AG IA: CPT | Performed by: INTERNAL MEDICINE

## 2024-08-16 PROCEDURE — 87045 FECES CULTURE AEROBIC BACT: CPT | Performed by: INTERNAL MEDICINE

## 2024-08-16 PROCEDURE — 87493 C DIFF AMPLIFIED PROBE: CPT | Performed by: INTERNAL MEDICINE

## 2024-08-16 PROCEDURE — 87046 STOOL CULTR AEROBIC BACT EA: CPT | Performed by: INTERNAL MEDICINE

## 2024-08-16 PROCEDURE — 89125 SPECIMEN FAT STAIN: CPT | Performed by: INTERNAL MEDICINE

## 2024-08-16 PROCEDURE — 87329 GIARDIA AG IA: CPT | Performed by: INTERNAL MEDICINE

## 2024-08-16 PROCEDURE — 83630 LACTOFERRIN FECAL (QUAL): CPT

## 2024-08-16 PROCEDURE — 87328 CRYPTOSPORIDIUM AG IA: CPT | Performed by: INTERNAL MEDICINE

## 2024-08-17 LAB
CRYPTOSP AG STL QL IA: NEGATIVE
G LAMBLIA AG STL QL IA: NEGATIVE

## 2024-08-19 DIAGNOSIS — R19.7 DIARRHEA, UNSPECIFIED TYPE: Primary | ICD-10-CM

## 2024-08-20 LAB
BACTERIA SPEC CULT: NORMAL
BACTERIA SPEC CULT: NORMAL
CAMPYLOBACTER STL CULT: NORMAL
E COLI SXT STL QL IA: NEGATIVE
SALM + SHIG STL CULT: NORMAL

## 2024-08-26 ENCOUNTER — TELEPHONE (OUTPATIENT)
Dept: INTERNAL MEDICINE | Facility: CLINIC | Age: 59
End: 2024-08-26
Payer: COMMERCIAL

## 2024-08-26 DIAGNOSIS — R19.7 DIARRHEA, UNSPECIFIED TYPE: Primary | ICD-10-CM

## 2024-08-26 NOTE — TELEPHONE ENCOUNTER
Referral faxed to Dr Ray today at [phone]. Patient informed & phone given to schedule as well [phone]

## 2024-08-26 NOTE — TELEPHONE ENCOUNTER
Pt called to say she can't get into the Gastro office until Oct. She would like to know what she should do until then. She asked if she should keep using the Pepto the whole time please advise

## 2024-08-26 NOTE — TELEPHONE ENCOUNTER
Lets see if someone else in Dr. Trujillo's office could see her sooner.  If not, lets refer to Dr. Sami Ray and see what his wait time is.

## 2024-08-27 RX ORDER — ATORVASTATIN CALCIUM 40 MG/1
40 TABLET, FILM COATED ORAL DAILY
Qty: 90 TABLET | Refills: 1 | Status: SHIPPED | OUTPATIENT
Start: 2024-08-27

## 2024-09-04 RX ORDER — VALSARTAN 160 MG/1
160 TABLET ORAL DAILY
Qty: 90 TABLET | Refills: 1 | Status: SHIPPED | OUTPATIENT
Start: 2024-09-04

## 2024-09-05 ENCOUNTER — OFFICE VISIT (OUTPATIENT)
Dept: GASTROENTEROLOGY | Facility: CLINIC | Age: 59
End: 2024-09-05
Payer: COMMERCIAL

## 2024-09-05 ENCOUNTER — PATIENT ROUNDING (BHMG ONLY) (OUTPATIENT)
Dept: GASTROENTEROLOGY | Facility: CLINIC | Age: 59
End: 2024-09-05
Payer: COMMERCIAL

## 2024-09-05 ENCOUNTER — TELEPHONE (OUTPATIENT)
Dept: GASTROENTEROLOGY | Facility: CLINIC | Age: 59
End: 2024-09-05
Payer: COMMERCIAL

## 2024-09-05 VITALS
WEIGHT: 141 LBS | HEART RATE: 67 BPM | BODY MASS INDEX: 26.62 KG/M2 | SYSTOLIC BLOOD PRESSURE: 129 MMHG | TEMPERATURE: 97.7 F | DIASTOLIC BLOOD PRESSURE: 83 MMHG | HEIGHT: 61 IN

## 2024-09-05 DIAGNOSIS — R19.4 ALTERED BOWEL HABITS: Primary | ICD-10-CM

## 2024-09-05 NOTE — TELEPHONE ENCOUNTER
RUTHANN - FOR [facility] WITH PROVIDER - [facility] WILL CALL WITH ARRIVE TIME A WEEK PRIOR - DATE IS   COLON   09/25/2024

## 2024-09-05 NOTE — PROGRESS NOTES
"Chief Complaint  Altered bowel habits    Subjective          History Of Present Illness:    Carolin Kauffman is a  59 y.o. female patient of Dr. Dodson who presents as a new patient for evaluation of diarrhea.    Patient underwent stool testing with Dr. BRADY which includes stool culture, Giardia Cryptosporidium, C. difficile,, fecal lactoferrin.  All studies were negative with the exception of a fecal lactoferrin.    Patient reports for the last two and half months she has been experiencing a change in bowel habits. She noticed foul smelling stools. She also noticed that her stools would float.  Patient reports a couple episodes of diarrhea. Patient denies constipation. Patient denies melena, hematochezia, abdominal pain.  Patient denies poor appetite or abnormal weight loss. Patient did have a round of antibiotics around the time of symptom onset.    Patients last colonoscopy was in 2015. No polyps at this time.     No family history of colon cancer or IBD.     Patient denies tobacco use, illicit use. Occasional alcohol use, about 2 a week.    Objective   Vital Signs:   /83   Pulse 67   Temp 97.7 °F (36.5 °C) (Temporal)   Ht 155.4 cm (61.2\")   Wt 64 kg (141 lb)   BMI 26.47 kg/m²       Physical Exam  Vitals reviewed.   Constitutional:       General: She is not in acute distress.     Appearance: Normal appearance. She is not ill-appearing.   HENT:      Head: Normocephalic and atraumatic.      Nose: Nose normal.      Mouth/Throat:      Pharynx: Oropharynx is clear.   Eyes:      Conjunctiva/sclera: Conjunctivae normal.   Pulmonary:      Effort: Pulmonary effort is normal.   Abdominal:      General: There is no distension.      Palpations: Abdomen is soft. There is no mass.      Tenderness: There is no abdominal tenderness.   Musculoskeletal:         General: No swelling. Normal range of motion.      Cervical back: Normal range of motion.   Skin:     General: Skin is warm and dry.      Findings: No bruising or " rash.   Neurological:      General: No focal deficit present.      Mental Status: She is alert and oriented to person, place, and time.      Motor: No weakness.      Gait: Gait normal.   Psychiatric:         Mood and Affect: Mood normal.          Result Review :   The following data was reviewed by: Kathy Mcwilliams PA-C on 09/05/2024:  CMP          6/20/2024    16:29 7/24/2024    09:02   CMP   Glucose 97  89    BUN 18  16    Creatinine 0.85  0.99    EGFR 78.5  65.4    Sodium 138  138    Potassium 3.8  4.7    Chloride 102  102    Calcium 9.5  9.7    Total Protein 7.2  7.0    Albumin 4.3  4.4    Globulin 2.9  2.6    Total Bilirubin 0.4  0.6    Alkaline Phosphatase 87  73    AST (SGOT) 25  22    ALT (SGPT) 18  19    Albumin/Globulin Ratio 1.5  1.7    BUN/Creatinine Ratio 21.2  16.2    Anion Gap 12.0  11.0      CBC          5/9/2024    14:33 6/20/2024    16:03 7/24/2024    09:02   CBC   WBC 8.11  6.84  5.72    RBC 4.81  4.50  4.83    Hemoglobin 14.3  13.6  14.1    Hematocrit 41.0  39.8  43.3    MCV 85.2  88.4  89.6    MCH 29.7  30.2  29.2    MCHC 34.9  34.2  32.6    RDW 12.4  12.7  12.7    Platelets 274  253  257            Assessment and Plan    Diagnoses and all orders for this visit:    1. Altered bowel habits (Primary)  -     Case Request; Standing  -     Implement Anesthesia orders day of procedure.; Standing  -     Verify bowel prep was successful; Standing  -     Give tap water enema if bowel prep was insufficient; Standing  -     Case Request       Continue peptobismol daily   Proceed with colonoscopy to rule out microscopic colitis or inflammation.  Further recommendations to be made based on the finding of her colonoscopy.    Follow Up   Return for Colonoscopy.    Dragon dictation used throughout this note.            Kathy Chambers PA-C   [facility]  [address] [place] [address]  Office: [phone]

## 2024-09-06 NOTE — PROGRESS NOTES
September 5, 2024    Hello, may I speak with Carolin Kauffman?    My name is Leilani      I am  with [facility]  [address] [facility] [address] [place] [id number] [phone].    Before we get started may I verify your date of birth? 1965    I am calling to officially welcome you to our practice and ask about your recent visit. Is this a good time to talk? yes    Tell me about your visit with us. What things went well?  My experience was very good and I do not have any negative comments.       We're always looking for ways to make our patients' experiences even better. Do you have recommendations on ways we may improve?  no    Overall were you satisfied with your first visit to our practice? yes       I appreciate you taking the time to speak with me today. Is there anything else I can do for you? no      Thank you, and have a great day.

## 2024-09-25 ENCOUNTER — OUTSIDE FACILITY SERVICE (OUTPATIENT)
Dept: GASTROENTEROLOGY | Facility: CLINIC | Age: 59
End: 2024-09-25
Payer: COMMERCIAL

## 2024-09-25 ENCOUNTER — TELEPHONE (OUTPATIENT)
Dept: GASTROENTEROLOGY | Facility: CLINIC | Age: 59
End: 2024-09-25
Payer: COMMERCIAL

## 2024-09-25 ENCOUNTER — LAB REQUISITION (OUTPATIENT)
Dept: LAB | Facility: HOSPITAL | Age: 59
End: 2024-09-25
Payer: COMMERCIAL

## 2024-09-25 DIAGNOSIS — R19.7 DIARRHEA, UNSPECIFIED TYPE: ICD-10-CM

## 2024-09-25 PROCEDURE — 45380 COLONOSCOPY AND BIOPSY: CPT | Performed by: INTERNAL MEDICINE

## 2024-09-25 PROCEDURE — 88305 TISSUE EXAM BY PATHOLOGIST: CPT | Performed by: INTERNAL MEDICINE

## 2024-09-26 LAB
CYTO UR: NORMAL
LAB AP CASE REPORT: NORMAL
PATH REPORT.FINAL DX SPEC: NORMAL
PATH REPORT.GROSS SPEC: NORMAL

## 2024-10-01 ENCOUNTER — TELEPHONE (OUTPATIENT)
Dept: GASTROENTEROLOGY | Facility: CLINIC | Age: 59
End: 2024-10-01
Payer: COMMERCIAL

## 2024-10-01 DIAGNOSIS — K59.00 CONSTIPATION, UNSPECIFIED CONSTIPATION TYPE: ICD-10-CM

## 2024-10-01 DIAGNOSIS — R10.9 RIGHT SIDED ABDOMINAL PAIN: ICD-10-CM

## 2024-10-01 DIAGNOSIS — R19.7 DIARRHEA, UNSPECIFIED TYPE: Primary | ICD-10-CM

## 2024-10-01 DIAGNOSIS — K62.5 RECTAL BLEEDING: ICD-10-CM

## 2024-10-01 NOTE — TELEPHONE ENCOUNTER
Patient called. Advised as per Dr. Madden's note. She verb understanding.   Advised of the instructions for her gastrin level:    12 hour fast, no caffeine, no coffee. No alcohol for 24 hours prior to lab work.     Sent results via my chart per patient request.

## 2024-10-01 NOTE — TELEPHONE ENCOUNTER
----- Message from Scottie Mdaden sent at 10/1/2024 11:13 AM EDT -----  NO Evidence of microscopic colitis  Please bring her in for the following tests:    Blood work : fasting gastrin, vasoactive intestinal polypeptide, glucagon, calcitonin, full celiac profile, sed rate, CRP, thyroid profile    CAT scan abdomen and pelvis with IV and p.o. contrast    Stool potassium, stool sodium, fecal calprotectin    Office visit MARLENI 1 month

## 2024-10-04 ENCOUNTER — LAB (OUTPATIENT)
Dept: LAB | Facility: HOSPITAL | Age: 59
End: 2024-10-04
Payer: COMMERCIAL

## 2024-10-04 PROCEDURE — 82784 ASSAY IGA/IGD/IGG/IGM EACH: CPT | Performed by: INTERNAL MEDICINE

## 2024-10-04 PROCEDURE — 82308 ASSAY OF CALCITONIN: CPT | Performed by: INTERNAL MEDICINE

## 2024-10-04 PROCEDURE — 84443 ASSAY THYROID STIM HORMONE: CPT | Performed by: INTERNAL MEDICINE

## 2024-10-04 PROCEDURE — 85652 RBC SED RATE AUTOMATED: CPT | Performed by: INTERNAL MEDICINE

## 2024-10-04 PROCEDURE — 86258 DGP ANTIBODY EACH IG CLASS: CPT | Performed by: INTERNAL MEDICINE

## 2024-10-04 PROCEDURE — 86364 TISS TRNSGLTMNASE EA IG CLAS: CPT | Performed by: INTERNAL MEDICINE

## 2024-10-04 PROCEDURE — 86140 C-REACTIVE PROTEIN: CPT | Performed by: INTERNAL MEDICINE

## 2024-10-04 PROCEDURE — 82941 ASSAY OF GASTRIN: CPT | Performed by: INTERNAL MEDICINE

## 2024-10-04 PROCEDURE — 82943 ASSAY OF GLUCAGON: CPT | Performed by: INTERNAL MEDICINE

## 2024-10-04 PROCEDURE — 84480 ASSAY TRIIODOTHYRONINE (T3): CPT | Performed by: INTERNAL MEDICINE

## 2024-10-04 PROCEDURE — 84436 ASSAY OF TOTAL THYROXINE: CPT | Performed by: INTERNAL MEDICINE

## 2024-10-04 PROCEDURE — 84479 ASSAY OF THYROID (T3 OR T4): CPT | Performed by: INTERNAL MEDICINE

## 2024-10-04 PROCEDURE — 86231 EMA EACH IG CLASS: CPT | Performed by: INTERNAL MEDICINE

## 2024-10-08 ENCOUNTER — LAB (OUTPATIENT)
Dept: LAB | Facility: HOSPITAL | Age: 59
End: 2024-10-08
Payer: COMMERCIAL

## 2024-10-08 PROCEDURE — 84302 ASSAY OF SWEAT SODIUM: CPT | Performed by: INTERNAL MEDICINE

## 2024-10-08 PROCEDURE — 83993 ASSAY FOR CALPROTECTIN FECAL: CPT | Performed by: INTERNAL MEDICINE

## 2024-10-08 PROCEDURE — 84999 UNLISTED CHEMISTRY PROCEDURE: CPT | Performed by: INTERNAL MEDICINE

## 2024-10-16 ENCOUNTER — TELEPHONE (OUTPATIENT)
Dept: GASTROENTEROLOGY | Facility: CLINIC | Age: 59
End: 2024-10-16
Payer: COMMERCIAL

## 2024-10-16 ENCOUNTER — HOSPITAL ENCOUNTER (OUTPATIENT)
Dept: MRI IMAGING | Facility: HOSPITAL | Age: 59
Discharge: HOME | End: 2024-10-16
Admitting: OBSTETRICS & GYNECOLOGY
Payer: COMMERCIAL

## 2024-10-16 DIAGNOSIS — N60.99 ATYPICAL DUCTAL HYPERPLASIA, BREAST: ICD-10-CM

## 2024-10-16 PROCEDURE — A9577 INJ MULTIHANCE: HCPCS | Performed by: OBSTETRICS & GYNECOLOGY

## 2024-10-16 PROCEDURE — 0 GADOBENATE DIMEGLUMINE 529 MG/ML SOLUTION: Performed by: OBSTETRICS & GYNECOLOGY

## 2024-10-16 PROCEDURE — 77049 MRI BREAST C-+ W/CAD BI: CPT

## 2024-10-16 RX ADMIN — GADOBENATE DIMEGLUMINE 12 ML: 529 INJECTION, SOLUTION INTRAVENOUS at 17:16

## 2024-10-16 NOTE — TELEPHONE ENCOUNTER
----- Message from Scottie Madden sent at 10/16/2024  9:13 AM EDT -----  calll her all the special testing we have done over the last 4 weeks is normal.  Her diagnosis is postinfectious irritable bowel syndrome.  Nothing serious going on.  Please call in Xifaxan 550 mg p.o. 3 times daily for 14 days, #42, 2 refills

## 2024-10-16 NOTE — TELEPHONE ENCOUNTER
Called pt and advised of Dr. Madden's note.  Pt verbalized understanding.     Rx e-scribed for xifaxan, sent to Dr Madden to cosign.

## 2024-10-17 DIAGNOSIS — R92.8 ABNORMAL MRI, BREAST: Primary | ICD-10-CM

## 2024-10-21 ENCOUNTER — TELEPHONE (OUTPATIENT)
Dept: GASTROENTEROLOGY | Facility: CLINIC | Age: 59
End: 2024-10-21
Payer: COMMERCIAL

## 2024-10-23 NOTE — TELEPHONE ENCOUNTER
Shirlene Zamora k [facility] Clinical 2 Pool  Caller: Unspecified (2 days ago,  9:09 AM)  PA denied for Xifaxan  1. Amitriptyline  2. Viberzi

## 2024-10-24 NOTE — TELEPHONE ENCOUNTER
Called pt and advised of Kathy SMITH's note.  Pt verbalized understanding.  We had enough samples and placed at  for pt to , she verbalized understanding.

## 2024-10-24 NOTE — TELEPHONE ENCOUNTER
Kathy Chambers PA-C Stowers, Sharon G, RN  Caller: Unspecified (3 days ago,  9:09 AM)  Please advise her that the medication we tried to call and it was denied by her insurance.  The alternative recommendations they suggested are not necessarily appropriate at this time.  Would recommend she start a daily fiber supplement such as FiberCon, Metamucil, Benefiber and additionally start a probiotic therapy such as [employer] probiotic or Florastor.  We will discuss more at her appointment.

## 2024-11-04 ENCOUNTER — HOSPITAL ENCOUNTER (OUTPATIENT)
Facility: HOSPITAL | Age: 59
Discharge: HOME | End: 2024-11-04
Admitting: INTERNAL MEDICINE
Payer: COMMERCIAL

## 2024-11-04 ENCOUNTER — TELEMEDICINE (OUTPATIENT)
Dept: INTERNAL MEDICINE | Facility: CLINIC | Age: 59
End: 2024-11-04
Payer: COMMERCIAL

## 2024-11-04 DIAGNOSIS — N30.00 ACUTE CYSTITIS WITHOUT HEMATURIA: Primary | ICD-10-CM

## 2024-11-04 DIAGNOSIS — K62.5 RECTAL BLEEDING: ICD-10-CM

## 2024-11-04 DIAGNOSIS — K59.00 CONSTIPATION, UNSPECIFIED CONSTIPATION TYPE: ICD-10-CM

## 2024-11-04 DIAGNOSIS — R10.9 RIGHT SIDED ABDOMINAL PAIN: ICD-10-CM

## 2024-11-04 PROCEDURE — 0 DIATRIZOATE MEGLUMINE & SODIUM PER 1 ML: Performed by: INTERNAL MEDICINE

## 2024-11-04 PROCEDURE — 25510000001 IOPAMIDOL 61 % SOLUTION: Performed by: INTERNAL MEDICINE

## 2024-11-04 PROCEDURE — 99213 OFFICE O/P EST LOW 20 MIN: CPT | Performed by: INTERNAL MEDICINE

## 2024-11-04 PROCEDURE — 74177 CT ABD & PELVIS W/CONTRAST: CPT

## 2024-11-04 RX ORDER — IOPAMIDOL 612 MG/ML
100 INJECTION, SOLUTION INTRAVASCULAR
Status: COMPLETED | OUTPATIENT
Start: 2024-11-04 | End: 2024-11-04

## 2024-11-04 RX ORDER — DIATRIZOATE MEGLUMINE AND DIATRIZOATE SODIUM 660; 100 MG/ML; MG/ML
30 SOLUTION ORAL; RECTAL
Status: COMPLETED | OUTPATIENT
Start: 2024-11-04 | End: 2024-11-04

## 2024-11-04 RX ADMIN — DIATRIZOATE MEGLUMINE AND DIATRIZOATE SODIUM 30 ML: 600; 100 SOLUTION ORAL; RECTAL at 16:26

## 2024-11-04 RX ADMIN — IOPAMIDOL 85 ML: 612 INJECTION, SOLUTION INTRAVENOUS at 17:34

## 2024-11-04 NOTE — PROGRESS NOTES
Mode of Visit: Video  Location of patient: home  Location of provider: [facility] clinic  You have chosen to receive care through a telehealth visit.                 Subjective   Carolin Kauffman is a 59 y.o. female.     Chief Complaint   Patient presents with    Urinary Tract Infection         Urinary Tract Infection   This is a new problem. The current episode started in the past 7 days. The problem occurs constantly. The problem has been unchanged. Associated symptoms include urgency. Pertinent negatives include no chills, nausea or vomiting.        The following portions of the patient's history were reviewed and updated as appropriate: allergies, current medications, past social history and problem list.    Outpatient Medications Marked as Taking for the 11/4/24 encounter (Telemedicine) with J Carlos Dodson MD   Medication Sig Dispense Refill    albuterol sulfate  (90 Base) MCG/ACT inhaler INHALE 2 PUFFS BY MOUTH TWICE A DAY 8.5 g 2    alendronate (Fosamax) 70 MG tablet Take 1 tablet by mouth Every 7 (Seven) Days. 12 tablet 3    atorvastatin (LIPITOR) 40 MG tablet TAKE 1 TABLET BY MOUTH DAILY 90 tablet 1    budesonide-formoterol (SYMBICORT) 160-4.5 MCG/ACT inhaler INHALE TWO PUFFS BY MOUTH TWICE A DAY 10.2 g 5    Cholecalciferol (VITAMIN D3) 50 MCG (2000 UT) capsule Take 1 capsule by mouth Daily.      nitrofurantoin, macrocrystal-monohydrate, (MACROBID) 100 MG capsule Take 1 capsule by mouth 2 (Two) Times a Day for 7 days. 14 capsule 0    phenazopyridine (PYRIDIUM) 200 MG tablet Take 1 tablet by mouth 3 (Three) Times a Day As Needed for Dysuria for up to 2 days. 6 tablet 0    riFAXIMin (Xifaxan) 550 MG tablet Take 1 tablet by mouth Every 8 (Eight) Hours. 42 tablet 2    valsartan (DIOVAN) 160 MG tablet TAKE 1 TABLET BY MOUTH DAILY 90 tablet 1       Review of Systems   Constitutional:  Negative for chills and fever.   Gastrointestinal:  Negative for nausea and vomiting.   Genitourinary:  Positive for urgency.    Musculoskeletal:  Negative for back pain.       Objective   There were no vitals filed for this visit.   Wt Readings from Last 3 Encounters:   11/03/24 61.2 kg (135 lb)   09/05/24 64 kg (141 lb)   07/26/24 63.5 kg (140 lb)    There is no height or weight on file to calculate BMI.      Physical Exam  Constitutional:       Appearance: Normal appearance.   Pulmonary:      Effort: Pulmonary effort is normal.   Neurological:      Mental Status: She is alert.   Psychiatric:         Mood and Affect: Mood normal.         Behavior: Behavior normal.         Thought Content: Thought content normal.         Judgment: Judgment normal.           Problems Addressed this Visit    None  Visit Diagnoses       Acute cystitis without hematuria    -  Primary          Diagnoses         Codes Comments    Acute cystitis without hematuria    -  Primary ICD-10-CM: N30.00  ICD-9-CM: 595.0           Assessment & Plan   Video visit today.  5 days ago she started having some urgency.  The next day on the trip to [place] she had a urinalysis at an immediate care center was told she had a UTI.  Started on Keflex at that time.  She felt better for about 36 hours and symptoms then resumed.  She went to [facility] care center last night upon return to [place] and they told her it look like her urine was still infected.  They ran a reflex culture and started her on Macrobid 100 mg twice daily.  She has not started that yet however.  She is having no dysuria.  No fever or chills.  No systemic symptoms.  It has been decades since her last UTI.  Review of the urinalysis from last night looks pretty darn clean.  I do not think this could be interpreted as recurrent or persistent UTI.  Furthermore the reflex culture would not be done with such a clean urine.  It is not clear to me that we are even dealing with a UTI.  We did some education today regarding UTI and overtreatment including the possibility that she might be having issues with vaginal  atrophy at her age.  Her symptoms are so minimal at this point I think she should simply finish the 7-day course of Keflex.  1 week thereafter if she is tolerating symptoms we will recheck a urinalysis and perhaps even the culture.  We might also have a conversation about starting some estrogen cream.  In fact her chart review suggest a prior diagnosis of vaginal atrophy.  Perhaps her atypical ductal hyperplasia has curtailed the use of estrogen.  That might be a conversation with her gynecologist.  [employer] platform used for the visit today.    The above information was reviewed again today 11/04/24.  It continues to be accurate as reflected above and is unchanged.  History, physical and review of systems all reviewed and are unchanged.  Medications were reviewed today and continue the current dosing.               Dragon disclaimer:   Part of this note may be an electronic transcription/translation of spoken language to printed text using the Dragon Dictation System.

## 2024-11-05 ENCOUNTER — HOSPITAL ENCOUNTER (OUTPATIENT)
Dept: MAMMOGRAPHY | Facility: HOSPITAL | Age: 59
Discharge: HOME | End: 2024-11-05
Payer: COMMERCIAL

## 2024-11-05 ENCOUNTER — HOSPITAL ENCOUNTER (OUTPATIENT)
Dept: MRI IMAGING | Facility: HOSPITAL | Age: 59
Discharge: HOME | End: 2024-11-05
Payer: COMMERCIAL

## 2024-11-05 VITALS
HEART RATE: 60 BPM | DIASTOLIC BLOOD PRESSURE: 84 MMHG | WEIGHT: 135 LBS | RESPIRATION RATE: 16 BRPM | TEMPERATURE: 97.7 F | SYSTOLIC BLOOD PRESSURE: 148 MMHG | BODY MASS INDEX: 25.49 KG/M2 | HEIGHT: 61 IN | OXYGEN SATURATION: 99 %

## 2024-11-05 DIAGNOSIS — R92.8 ABNORMAL MRI, BREAST: ICD-10-CM

## 2024-11-05 LAB — CREAT BLDA-MCNC: 0.8 MG/DL (ref 0.6–1.3)

## 2024-11-05 PROCEDURE — 25010000002 LIDOCAINE 1 % SOLUTION: Performed by: OBSTETRICS & GYNECOLOGY

## 2024-11-05 PROCEDURE — C1894 INTRO/SHEATH, NON-LASER: HCPCS

## 2024-11-05 PROCEDURE — A4648 IMPLANTABLE TISSUE MARKER: HCPCS

## 2024-11-05 PROCEDURE — 77065 DX MAMMO INCL CAD UNI: CPT

## 2024-11-05 PROCEDURE — 0 GADOBENATE DIMEGLUMINE 529 MG/ML SOLUTION: Performed by: OBSTETRICS & GYNECOLOGY

## 2024-11-05 PROCEDURE — 88305 TISSUE EXAM BY PATHOLOGIST: CPT | Performed by: OBSTETRICS & GYNECOLOGY

## 2024-11-05 PROCEDURE — A9577 INJ MULTIHANCE: HCPCS | Performed by: OBSTETRICS & GYNECOLOGY

## 2024-11-05 PROCEDURE — 25010000002 LIDOCAINE-EPINEPHRINE (PF) 1 %-1:200000 SOLUTION: Performed by: OBSTETRICS & GYNECOLOGY

## 2024-11-05 PROCEDURE — 82565 ASSAY OF CREATININE: CPT

## 2024-11-05 RX ORDER — LIDOCAINE HYDROCHLORIDE 10 MG/ML
10 INJECTION, SOLUTION INFILTRATION; PERINEURAL ONCE
Status: COMPLETED | OUTPATIENT
Start: 2024-11-05 | End: 2024-11-05

## 2024-11-05 RX ADMIN — LIDOCAINE HYDROCHLORIDE 1 ML: 10 INJECTION, SOLUTION INFILTRATION; PERINEURAL at 08:40

## 2024-11-05 RX ADMIN — GADOBENATE DIMEGLUMINE 12 ML: 529 INJECTION, SOLUTION INTRAVENOUS at 08:22

## 2024-11-05 RX ADMIN — LIDOCAINE HYDROCHLORIDE 15 ML: 10; .005 INJECTION, SOLUTION EPIDURAL; INFILTRATION; INTRACAUDAL; PERINEURAL at 08:40

## 2024-11-05 NOTE — NURSING NOTE
Biopsy site to left outer mid breast clear with Dermabond dry and intact. No firmness or swelling noted at or around biopsy site. Denies pain. Ice pack with protective covering applied to biopsy site. Discharge instructions discussed with understanding voiced by patient. Copies provided to patient. No distress noted. To home via private vehicle.

## 2024-11-06 ENCOUNTER — TELEPHONE (OUTPATIENT)
Dept: OBSTETRICS AND GYNECOLOGY | Age: 59
End: 2024-11-06
Payer: COMMERCIAL

## 2024-11-06 RX ORDER — ESTRADIOL 0.1 MG/G
CREAM VAGINAL
Qty: 1 EACH | Refills: 11 | Status: SHIPPED | OUTPATIENT
Start: 2024-11-07 | End: 2024-11-08 | Stop reason: SDUPTHER

## 2024-11-07 DIAGNOSIS — Z12.31 SCREENING MAMMOGRAM FOR BREAST CANCER: Primary | ICD-10-CM

## 2024-11-08 ENCOUNTER — OFFICE VISIT (OUTPATIENT)
Dept: OBSTETRICS AND GYNECOLOGY | Age: 59
End: 2024-11-08
Payer: COMMERCIAL

## 2024-11-08 VITALS
WEIGHT: 138 LBS | SYSTOLIC BLOOD PRESSURE: 130 MMHG | DIASTOLIC BLOOD PRESSURE: 78 MMHG | HEIGHT: 61 IN | BODY MASS INDEX: 26.06 KG/M2

## 2024-11-08 DIAGNOSIS — R39.15 URINARY URGENCY: ICD-10-CM

## 2024-11-08 DIAGNOSIS — N95.2 VAGINAL ATROPHY: Primary | ICD-10-CM

## 2024-11-08 PROCEDURE — 99213 OFFICE O/P EST LOW 20 MIN: CPT | Performed by: OBSTETRICS & GYNECOLOGY

## 2024-11-08 RX ORDER — ESTRADIOL 0.1 MG/G
CREAM VAGINAL
Qty: 1 EACH | Refills: 11 | Status: SHIPPED | OUTPATIENT
Start: 2024-11-08

## 2024-11-08 NOTE — PROGRESS NOTES
"  Chief complaint-urinary urgency    History of present illness- Patient is a 59 y.o.  who was on vacation in [place] and noticed some urinary urgency.  She went to an urgent care and they did a urine dip and told her she might have a UTI.  They started her on Keflex and also gave her some Macrobid to keep.  They told her they were going to do urine culture but never called her.  When she got back in town she went to [facility] [place] a urinalysis was done there which was essentially negative.  She was told to complete the Keflex.  Patient then went and saw her primary care Dr Dodson.  He thought there may be a component of vaginal atrophy since that was listed on her problem list.  Patient is here to talk about that.    Patient is sexually active.  She feels like her symptoms are better but does not want to get recurrent symptoms.  She is not having any incontinence.         /78   Ht 154.9 cm (61\")   Wt 62.6 kg (138 lb)   LMP 2017   BMI 26.07 kg/m²   Physical Exam  Constitutional:       General: She is not in acute distress.     Appearance: Normal appearance. She is not ill-appearing.   Genitourinary:     Comments: External genitalia have pallor consistent with vaginal atrophy.  There is some agglutination of the labia but no white epithelium.  No lesions are seen.  No abnormal discharge.  Neurological:      Mental Status: She is alert.   Psychiatric:         Mood and Affect: Mood normal.         Thought Content: Thought content normal.         Judgment: Judgment normal.           Diagnoses and all orders for this visit:    1. Vaginal atrophy (Primary)    2. Urinary urgency    Other orders  -     estradiol (ESTRACE VAGINAL) 0.1 MG/GM vaginal cream; Apply 1gram to the vulva twice weekly at bedtime  Dispense: 1 each; Refill: 11    Joni that the symptoms could be related to multiple things it could be related to a UTI but we do not have a culture to know if she truly had a UTI in the first place.  Patient " is feeling better.  We discussed ways to prevent UTI and handouts were given.  Recommend d-mannose in addition to emptying her bladder before and after sex and drinking enough fluids.    Patient could have some overactive bladder but it does not quite sound like she has symptoms of that.    On exam the patient does have vaginal atrophy with some agglutination but no white epithelium just pallor.  Would recommend that the patient's start Estrace vaginal cream daily for 2 weeks and then go down to twice a week.  Patient is in agreement.    Patient had a recent breast biopsy that came back benign.

## 2024-11-12 ENCOUNTER — OFFICE VISIT (OUTPATIENT)
Dept: GASTROENTEROLOGY | Facility: CLINIC | Age: 59
End: 2024-11-12
Payer: COMMERCIAL

## 2024-11-12 VITALS
TEMPERATURE: 97.2 F | DIASTOLIC BLOOD PRESSURE: 75 MMHG | SYSTOLIC BLOOD PRESSURE: 120 MMHG | BODY MASS INDEX: 25.4 KG/M2 | HEIGHT: 62 IN | WEIGHT: 138 LBS | HEART RATE: 71 BPM

## 2024-11-12 DIAGNOSIS — R19.4 ALTERED BOWEL HABITS: Primary | ICD-10-CM

## 2024-11-12 NOTE — PROGRESS NOTES
"Chief Complaint  change in bowel habits    Subjective          History Of Present Illness:    Carolin Kauffman is a  59 y.o. female patient of Dr. Madden who presents as a follow-up for altered bowel pattern.    Prior stool testing including stool culture, Giardia/Cryptosporidium, C. difficile, fecal lactoferrin were all negative with the exception of fecal lactoferrin.    Patient reports at this point are regular 95% of the time. She does still have some loose stools with passing gas but this seems improved. Patient denies abdominal pain, nausea, vomiting, melena, hematochezia.  Appetite is good and weight is stable.    CT abdomen pelvis with formed stool throughout most of the colon and in the rectum.  No colonic thickening to suggest colitis or proctitis, normal-appearing liver with the exception of small hepatic cysts, normal gallbladder, bile ducts, spleen, pancreas.  There is a stable sub-6 mm right lower lobe pulmonary nodule.    Additional data reviewed:   Colonoscopy 9/25/2024 -normal TI, nonbleeding internal hemorrhoids, otherwise normal.  Biopsies negative for scopic colitis.  She had normal thyroid profile Calcitonin, CRP, ESR, celiac panel, glucagon, gastrin, vasoactive intestinal polypeptide.    Objective   Vital Signs:   /75   Pulse 71   Temp 97.2 °F (36.2 °C)   Ht 157.5 cm (62\")   Wt 62.6 kg (138 lb)   BMI 25.24 kg/m²       Physical Exam  Vitals reviewed.   Constitutional:       General: She is not in acute distress.     Appearance: Normal appearance. She is not ill-appearing.   HENT:      Head: Normocephalic and atraumatic.      Nose: Nose normal.      Mouth/Throat:      Pharynx: Oropharynx is clear.   Eyes:      Conjunctiva/sclera: Conjunctivae normal.   Pulmonary:      Effort: Pulmonary effort is normal.   Musculoskeletal:         General: No swelling. Normal range of motion.      Cervical back: Normal range of motion.   Skin:     General: Skin is warm and dry.      Findings: No " bruising or rash.   Neurological:      General: No focal deficit present.      Mental Status: She is alert and oriented to person, place, and time.      Motor: No weakness.      Gait: Gait normal.   Psychiatric:         Mood and Affect: Mood normal.          Result Review :   The following data was reviewed by: Kathy Mcwilliams PA-C on 11/12/2024:  CMP          6/20/2024    16:29 7/24/2024    09:02 11/5/2024    07:42   CMP   Glucose 97  89     BUN 18  16     Creatinine 0.85  0.99  0.80    EGFR 78.5  65.4     Sodium 138  138     Potassium 3.8  4.7     Chloride 102  102     Calcium 9.5  9.7     Total Protein 7.2  7.0     Albumin 4.3  4.4     Globulin 2.9  2.6     Total Bilirubin 0.4  0.6     Alkaline Phosphatase 87  73     AST (SGOT) 25  22     ALT (SGPT) 18  19     Albumin/Globulin Ratio 1.5  1.7     BUN/Creatinine Ratio 21.2  16.2     Anion Gap 12.0  11.0       CBC          5/9/2024    14:33 6/20/2024    16:03 7/24/2024    09:02   CBC   WBC 8.11  6.84  5.72    RBC 4.81  4.50  4.83    Hemoglobin 14.3  13.6  14.1    Hematocrit 41.0  39.8  43.3    MCV 85.2  88.4  89.6    MCH 29.7  30.2  29.2    MCHC 34.9  34.2  32.6    RDW 12.4  12.7  12.7    Platelets 274  253  257            Assessment and Plan    Diagnoses and all orders for this visit:    1. Altered bowel habits (Primary)       Discussed CT findings with moderate amount of stool within the colon.  Recommend fiber supplementation with Metamucil  Patient provided with samples of Xifaxan if looser stools return.  Will hold off on starting this for now.  Will hold off on further stool studies as her symptoms seem to have improved with minimal intervention.    Follow Up   Return in about 1 year (around 11/12/2025) for Kathy Chambers PA-C.    Dragon dictation used throughout this note.            Kathy Chambers PA-C   [facility] [place]  [address] [place] [address]  Office: [phone]

## 2024-11-15 ENCOUNTER — TELEPHONE (OUTPATIENT)
Dept: GASTROENTEROLOGY | Facility: CLINIC | Age: 59
End: 2024-11-15
Payer: COMMERCIAL

## 2024-11-15 NOTE — TELEPHONE ENCOUNTER
Message received from Elisa Swanson,  at [facility]:     Jarett Flores- I am at the lab at [facility]. I tried calling the office and got no answer on the provider line. you ordered the stool testing for Dr Madden, and I wanted to notify you that the reference lab could not perform the testing for the sodium or potassium on this patients stool. The stool was too viscous or formed, and the testing requires liquid stool. The Calprotectin testing was done, since it doesn't require the stool to be in a liquid form. Please let me know you received this message. Thanks Diamante

## 2024-11-15 NOTE — TELEPHONE ENCOUNTER
Hub staff attempted to follow warm transfer process and was unsuccessful     Caller: KAIA BARR    Relationship to patient: SELF    Best call back number: [phone]     Patient is needing: PT IS NEEDING ANGELIC BROTHERS RN TO CALL HER BACK

## 2024-11-15 NOTE — TELEPHONE ENCOUNTER
Called pt and advised of note from Lab and Dr Madden.  Pt state she is no longer having diarrhea.  Advised pt to call back if sx return. Pt verbalized understanding.     Update sent to Dr Madden.

## 2024-11-15 NOTE — TELEPHONE ENCOUNTER
Scottie Madden MD Haag, Shelley D, LUPE; P Mg[facility] Clinical 2 Pool  Caller: Unspecified (Today, 11:13 AM)  Please relay this to the patient.  If she would like us to reorder the test she will need to bring in diarrhea liquid stool for stool sodium and potassium thank you

## 2024-11-18 ENCOUNTER — TELEPHONE (OUTPATIENT)
Dept: GASTROENTEROLOGY | Facility: CLINIC | Age: 59
End: 2024-11-18
Payer: COMMERCIAL

## 2024-11-18 NOTE — TELEPHONE ENCOUNTER
----- Message from Scottie Madden sent at 11/16/2024 11:28 AM EST -----  Normal CAT scan  She has told my staff that her diarrhea has resolved  Follow-up with PCP

## 2024-11-18 NOTE — TELEPHONE ENCOUNTER
Pt reviewed results via [employer].     Sent pt [employer] msg advising of results and recommendations. Advised to call if any questions.

## 2024-11-20 RX ORDER — BUDESONIDE AND FORMOTEROL FUMARATE DIHYDRATE 160; 4.5 UG/1; UG/1
2 AEROSOL RESPIRATORY (INHALATION) 2 TIMES DAILY
Qty: 10.2 G | Refills: 5 | Status: SHIPPED | OUTPATIENT
Start: 2024-11-20

## 2025-01-23 ENCOUNTER — OFFICE VISIT (OUTPATIENT)
Dept: INTERNAL MEDICINE | Facility: CLINIC | Age: 60
End: 2025-01-23
Payer: COMMERCIAL

## 2025-01-23 VITALS
WEIGHT: 142 LBS | BODY MASS INDEX: 26.13 KG/M2 | DIASTOLIC BLOOD PRESSURE: 70 MMHG | OXYGEN SATURATION: 97 % | SYSTOLIC BLOOD PRESSURE: 130 MMHG | TEMPERATURE: 97.9 F | HEIGHT: 62 IN | RESPIRATION RATE: 18 BRPM | HEART RATE: 60 BPM

## 2025-01-23 DIAGNOSIS — E78.00 HIGH CHOLESTEROL: Chronic | ICD-10-CM

## 2025-01-23 DIAGNOSIS — I10 ESSENTIAL HYPERTENSION: Primary | Chronic | ICD-10-CM

## 2025-01-23 PROCEDURE — 99214 OFFICE O/P EST MOD 30 MIN: CPT | Performed by: INTERNAL MEDICINE

## 2025-01-23 NOTE — PROGRESS NOTES
Subjective   Carolin Kauffman is a 59 y.o. female.     Chief Complaint   Patient presents with    Hyperlipidemia    Hypertension         Hyperlipidemia  This is a new problem. The current episode started more than 1 month ago. Pertinent negatives include no chest pain or shortness of breath. Current antihyperlipidemic treatment includes statins.   Hypertension  This is a chronic problem. The current episode started more than 1 year ago. The problem is unchanged. Pertinent negatives include no chest pain, headaches, palpitations or shortness of breath.        The following portions of the patient's history were reviewed and updated as appropriate: allergies, current medications, past social history and problem list.    Outpatient Medications Marked as Taking for the 1/23/25 encounter (Office Visit) with J Carlos Dodson MD   Medication Sig Dispense Refill    albuterol sulfate  (90 Base) MCG/ACT inhaler INHALE 2 PUFFS BY MOUTH TWICE A DAY 8.5 g 2    alendronate (Fosamax) 70 MG tablet Take 1 tablet by mouth Every 7 (Seven) Days. 12 tablet 3    atorvastatin (LIPITOR) 40 MG tablet TAKE 1 TABLET BY MOUTH DAILY 90 tablet 1    Cholecalciferol (VITAMIN D3) 50 MCG (2000 UT) capsule Take 1 capsule by mouth Daily.      estradiol (ESTRACE VAGINAL) 0.1 MG/GM vaginal cream Apply 1gram to the vulva twice weekly at bedtime 1 each 11    riFAXIMin (Xifaxan) 550 MG tablet Take 1 tablet by mouth Every 8 (Eight) Hours. 42 tablet 2    Symbicort 160-4.5 MCG/ACT inhaler INHALE 2 PUFFS BY MOUTH TWICE A DAY 10.2 g 5    valsartan (DIOVAN) 160 MG tablet TAKE 1 TABLET BY MOUTH DAILY 90 tablet 1       Review of Systems   Respiratory:  Negative for cough, shortness of breath and wheezing.    Cardiovascular:  Negative for chest pain, palpitations and leg swelling.   Gastrointestinal:  Negative for abdominal pain, constipation and diarrhea.   Neurological:  Negative for headaches.       Objective   Vitals:    01/23/25 1036   BP: 130/70    Pulse: 60   Resp: 18   Temp: 97.9 °F (36.6 °C)   SpO2: 97%      Wt Readings from Last 3 Encounters:   01/23/25 64.4 kg (142 lb)   11/12/24 62.6 kg (138 lb)   11/08/24 62.6 kg (138 lb)    Body mass index is 25.97 kg/m².      Physical Exam  Constitutional:       Appearance: Normal appearance. She is well-developed.   Neck:      Thyroid: No thyromegaly.   Cardiovascular:      Rate and Rhythm: Normal rate and regular rhythm.      Heart sounds: Normal heart sounds. No murmur heard.     No gallop.   Pulmonary:      Effort: Pulmonary effort is normal. No respiratory distress.      Breath sounds: Normal breath sounds. No wheezing or rales.   Neurological:      Mental Status: She is alert.           Problems Addressed this Visit          Cardiac and Vasculature    High cholesterol (Chronic)    Essential hypertension - Primary (Chronic)     Diagnoses         Codes Comments    Essential hypertension    -  Primary ICD-10-CM: I10  ICD-9-CM: 401.9     High cholesterol     ICD-10-CM: E78.00  ICD-9-CM: 272.0           Assessment & Plan   In for 6-month recheck of hypertension hyperlipidemia today January 2025.  Began on Lipitor in July 2022.  She is asymptomatic at the present time.  Remains on Lipitor 40 mg daily and Diovan 160 mg daily for hypertension those are reviewed today.  We will recheck lipid profile today.  Annual preventive exam and annual lab work will be July 2025.  She is 3 HPP today.    The above information was reviewed again today 01/23/25.  It continues to be accurate as reflected above and is unchanged.  History, physical and review of systems all reviewed and are unchanged.  Medications were reviewed today and continue the current dosing.             Dragon disclaimer:   Part of this note may be an electronic transcription/translation of spoken language to printed text using the Dragon Dictation System.

## 2025-01-27 RX ORDER — ALBUTEROL SULFATE 90 UG/1
2 INHALANT RESPIRATORY (INHALATION) 2 TIMES DAILY
Qty: 8.5 G | Refills: 2 | Status: SHIPPED | OUTPATIENT
Start: 2025-01-27

## 2025-02-21 NOTE — TELEPHONE ENCOUNTER
Patient notified of recommendations and verbalized understanding  Message to MAs to work on PA   Submitted 10/21 Key: [id number]

## 2025-02-24 ENCOUNTER — PATIENT ROUNDING (BHMG ONLY) (OUTPATIENT)
Dept: INTERNAL MEDICINE | Facility: CLINIC | Age: 60
End: 2025-02-24
Payer: COMMERCIAL

## 2025-02-24 NOTE — PROGRESS NOTES
February 24, 2025    Hello, may I speak with Carolin Kauffman?    My name is Yojana Lyman      [facility]  [address] [place] [id number] [phone].    Before we get started may I verify your date of birth? 1965    I am calling to officially welcome you to our practice and ask about your recent visit. Is this a good time to talk? yes    Tell me about your visit with us. What things went well?  all went good       We're always looking for ways to make our patients' experiences even better. Do you have recommendations on ways we may improve?  no    Overall were you satisfied with your first visit to our practice? yes       I appreciate you taking the time to speak with me today. Is there anything else I can do for you? no      Thank you, and have a great day.

## 2025-03-03 ENCOUNTER — PATIENT ROUNDING (BHMG ONLY) (OUTPATIENT)
Dept: INTERNAL MEDICINE | Facility: CLINIC | Age: 60
End: 2025-03-03
Payer: COMMERCIAL

## 2025-03-03 RX ORDER — VALSARTAN 160 MG/1
160 TABLET ORAL DAILY
Qty: 30 TABLET | Refills: 0 | Status: SHIPPED | OUTPATIENT
Start: 2025-03-03

## 2025-03-03 NOTE — PROGRESS NOTES
March 3, 2025    Hello, may I speak with Carolin Kauffman?    My name is Yojana Lyman      [facility]  [facility]  [address] [place] [id number] [phone].    Before we get started may I verify your date of birth? 1965    I am calling to officially welcome you to our practice and ask about your recent visit. Is this a good time to talk? yes    Tell me about your visit with us. What things went well?  great       We're always looking for ways to make our patients' experiences even better. Do you have recommendations on ways we may improve?  no    Overall were you satisfied with your first visit to our practice? yes       I appreciate you taking the time to speak with me today. Is there anything else I can do for you? no      Thank you, and have a great day.

## 2025-03-24 RX ORDER — ATORVASTATIN CALCIUM 40 MG/1
40 TABLET, FILM COATED ORAL DAILY
Qty: 30 TABLET | Refills: 0 | Status: SHIPPED | OUTPATIENT
Start: 2025-03-24

## 2025-04-24 RX ORDER — VALSARTAN 160 MG/1
160 TABLET ORAL DAILY
Qty: 30 TABLET | Refills: 0 | Status: SHIPPED | OUTPATIENT
Start: 2025-04-24

## 2025-04-24 RX ORDER — ALENDRONATE SODIUM 70 MG/1
70 TABLET ORAL WEEKLY
Qty: 12 TABLET | Refills: 3 | Status: SHIPPED | OUTPATIENT
Start: 2025-04-24 | End: 2025-04-25 | Stop reason: SDUPTHER

## 2025-04-24 RX ORDER — ATORVASTATIN CALCIUM 40 MG/1
40 TABLET, FILM COATED ORAL DAILY
Qty: 30 TABLET | Refills: 0 | Status: SHIPPED | OUTPATIENT
Start: 2025-04-24

## 2025-04-25 ENCOUNTER — OFFICE VISIT (OUTPATIENT)
Dept: OBSTETRICS AND GYNECOLOGY | Age: 60
End: 2025-04-25
Payer: COMMERCIAL

## 2025-04-25 ENCOUNTER — HOSPITAL ENCOUNTER (OUTPATIENT)
Facility: HOSPITAL | Age: 60
Discharge: HOME | End: 2025-04-25
Admitting: OBSTETRICS & GYNECOLOGY
Payer: COMMERCIAL

## 2025-04-25 VITALS
DIASTOLIC BLOOD PRESSURE: 70 MMHG | BODY MASS INDEX: 25.76 KG/M2 | HEIGHT: 62 IN | SYSTOLIC BLOOD PRESSURE: 120 MMHG | WEIGHT: 140 LBS

## 2025-04-25 DIAGNOSIS — M81.6 LOCALIZED OSTEOPOROSIS WITHOUT CURRENT PATHOLOGICAL FRACTURE: ICD-10-CM

## 2025-04-25 DIAGNOSIS — Z12.31 SCREENING MAMMOGRAM FOR BREAST CANCER: ICD-10-CM

## 2025-04-25 DIAGNOSIS — Z01.419 WELL FEMALE EXAM WITH ROUTINE GYNECOLOGICAL EXAM: ICD-10-CM

## 2025-04-25 DIAGNOSIS — N60.99 ATYPICAL DUCTAL HYPERPLASIA OF BREAST: Primary | ICD-10-CM

## 2025-04-25 PROCEDURE — 77063 BREAST TOMOSYNTHESIS BI: CPT

## 2025-04-25 PROCEDURE — 99396 PREV VISIT EST AGE 40-64: CPT | Performed by: OBSTETRICS & GYNECOLOGY

## 2025-04-25 PROCEDURE — 77067 SCR MAMMO BI INCL CAD: CPT

## 2025-04-25 RX ORDER — ALENDRONATE SODIUM 70 MG/1
70 TABLET ORAL WEEKLY
Qty: 12 TABLET | Refills: 3 | Status: SHIPPED | OUTPATIENT
Start: 2025-04-25

## 2025-04-25 RX ORDER — ESTRADIOL 0.1 MG/G
CREAM VAGINAL
Qty: 1 EACH | Refills: 11 | Status: SHIPPED | OUTPATIENT
Start: 2025-04-25

## 2025-04-25 RX ORDER — PHENOL 1.4 %
600 AEROSOL, SPRAY (ML) MUCOUS MEMBRANE DAILY
COMMUNITY

## 2025-04-25 NOTE — PROGRESS NOTES
Subjective     Chief Complaint   Patient presents with    Gynecologic Exam     AE- last pap 24(-) MG today, c-scope 64, no complaints today        History of Present Illness    Carolin Kauffman is a 60 y.o.  who presents for annual exam.  Patient has seen Dr. Lopez in the past for atypical hyperplasia of the breast.  She also saw oncology.  She did not want to take any tamoxifen.  She does alternate MRIs and mammograms every 6 months.  Patient had had some bowel changes and had a colonoscopy which was normal.  Patient is sexually active without pain and uses Estrace vaginal cream.  She was diagnosed with osteoporosis last year and started on Fosamax.  She is tolerating it well.  She wanted to know about medications that she is read about that can increase bone.  Patient is still working as a  and staying active with exercise.   Obstetric History:  OB History          3    Para   3    Term   3            AB        Living             SAB        IAB        Ectopic        Molar        Multiple        Live Births   3               Menstrual History:     Patient's last menstrual period was 2017.         Current contraception: post menopausal status  History of abnormal Pap smear: no  Received Gardasil immunization: no  Perform regular self breast exam : yes  Family history of uterine or ovarian cancer: no  Family History of colon cancer: no  Family history of breast cancer: no    Mammogram: done today.  Colonoscopy: up to date.  q 10   DEXA: up to date.  osteoporosis on fosamax     Exercise: 4 days a week   Calcium/Vitamin D: adequate intake and uses supplements    The following portions of the patient's history were reviewed and updated as appropriate: allergies, current medications, past family history, past medical history, past social history, past surgical history, and problem list.    Review of Systems      Genitourinary: Negative for dysuria.   Neurological:  "Negative for headaches.   Psychiatric/Behavioral: Negative for dysphoric mood.     Objective   Physical Exam    /70   Ht 157.5 cm (62\")   Wt 63.5 kg (140 lb)   LMP 08/01/2017   BMI 25.61 kg/m²     General:   alert, appears stated age and cooperative   Neck: thyroid normal to palpation   Heart: regular rate and rhythm   Lungs: clear to auscultation bilaterally   Abdomen: soft, non-tender, without masses or organomegaly   Breast: inspection negative, no nipple discharge or bleeding, no masses or nodularity palpable   Vulva: normal, Bartholin's, Urethra, Loiza's normal   Vagina: Pallor consistent with postmenopausal noted.  No abnormal discharge.   Cervix: no cervical motion tenderness and no lesions   Uterus: non-tender, normal shape and consistency   Adnexa: no mass, fullness, tenderness   Rectal: not indicated     Assessment & Plan   Diagnoses and all orders for this visit:    1. Atypical ductal hyperplasia of breast (Primary)  -     MRI Breast Bilateral Diagnostic W WO Contrast; Future    2. Well female exam with routine gynecological exam    3. Screening mammogram for breast cancer  -     Mammo Screening Digital Tomosynthesis Bilateral With CAD; Future    4. Localized osteoporosis without current pathological fracture  -     Ambulatory Referral to Endocrinology  -     DEXA Bone Density Axial; Future    Other orders  -     alendronate (FOSAMAX) 70 MG tablet; Take 1 tablet by mouth 1 (One) Time Per Week. Take on an empty stomach before breakfast with 8 ounces of water. Remain upright for 30 minutes after taking.  Dispense: 12 tablet; Refill: 3  -     estradiol (ESTRACE VAGINAL) 0.1 MG/GM vaginal cream; Apply 1gram to the vulva twice weekly at bedtime  Dispense: 1 each; Refill: 11    Pap is up-to-date  Osteoporosis-patient is currently on Fosamax and will need repeat bone density next year.  Patient is only 60 and has questions about other newer osteoporosis medications.  I will refer her to " endocrinology.  Genitourinary symptoms of menopause-continue Estrace cream twice weekly  History of atypical hyperplasia of the breast.  Continue to alternate mammograms and MRIs.  Patient has previously seen oncology and does not desire any additional medications.    All questions answered.  Breast self exam technique reviewed and patient encouraged to perform self-exam monthly.  Discussed healthy lifestyle modifications.  Recommended 30 minutes of aerobic exercise five times per week.  Discussed calcium needs to prevent osteoporosis.

## 2025-05-30 RX ORDER — ATORVASTATIN CALCIUM 40 MG/1
40 TABLET, FILM COATED ORAL DAILY
Qty: 30 TABLET | Refills: 0 | Status: SHIPPED | OUTPATIENT
Start: 2025-05-30

## 2025-05-30 RX ORDER — VALSARTAN 160 MG/1
160 TABLET ORAL DAILY
Qty: 30 TABLET | Refills: 0 | Status: SHIPPED | OUTPATIENT
Start: 2025-05-30

## 2025-06-19 ENCOUNTER — TELEPHONE (OUTPATIENT)
Dept: INTERNAL MEDICINE | Facility: CLINIC | Age: 60
End: 2025-06-19
Payer: COMMERCIAL

## 2025-06-19 NOTE — TELEPHONE ENCOUNTER
Patient went to the urgent care for urinary issues. Patient is waiting for culture to come back but would like to know if you will refer to urology.    Please advise

## 2025-06-19 NOTE — TELEPHONE ENCOUNTER
She could either see urologist or her gynecologist.  Right now it does not look like a uti, just dysuria.  Culture is pending.  Would be smart to wait for culture results.  Most often the issue is age related vaginal dryness treated with topical estrogen.

## 2025-06-20 ENCOUNTER — PATIENT ROUNDING (BHMG ONLY) (OUTPATIENT)
Dept: URGENT CARE | Facility: CLINIC | Age: 60
End: 2025-06-20
Payer: COMMERCIAL

## 2025-06-20 NOTE — ED NOTES
Thank you for letting us care for you in your recent visit to our urgent care center. We would love to hear about your experience with us. Was this the first time you have visited our location?    We’re always looking for ways to make our patients’ experiences even better. Do you have any recommendations on ways we may improve?     I appreciate you taking the time to respond. Please be on the lookout for a survey about your recent visit from [employer] via text or email. We would greatly appreciate if you could fill that out and turn it back in. We want your voice to be heard and we value your feedback.   Thank you for choosing [facility] for your healthcare needs.

## 2025-06-30 RX ORDER — ATORVASTATIN CALCIUM 40 MG/1
40 TABLET, FILM COATED ORAL DAILY
Qty: 30 TABLET | Refills: 0 | Status: SHIPPED | OUTPATIENT
Start: 2025-06-30

## 2025-06-30 RX ORDER — VALSARTAN 160 MG/1
160 TABLET ORAL DAILY
Qty: 30 TABLET | Refills: 0 | Status: SHIPPED | OUTPATIENT
Start: 2025-06-30

## 2025-07-09 ENCOUNTER — TELEPHONE (OUTPATIENT)
Dept: INTERNAL MEDICINE | Facility: CLINIC | Age: 60
End: 2025-07-09
Payer: COMMERCIAL

## 2025-07-09 DIAGNOSIS — R35.0 FREQUENT URINATION: Primary | ICD-10-CM

## 2025-07-09 NOTE — TELEPHONE ENCOUNTER
Caller: Carolin Kauffman    Relationship: Self    Best call back number: [phone] (Mobile)     What is the best time to reach you: ANYTIME    Who are you requesting to speak with (clinical staff, provider,  specific staff member): CLINICAL STAFF       What was the call regarding: PATIENT CALLED TO REQUEST A CALLBACK TO DISCUSS AN URGE TO GO AND URINATE ALONG WITH FREQUENCY.    PLEASE CONTACT PATIENT TO ADVISE.      Is it okay if the provider responds through MyChart:  YES        THANKS

## 2025-07-09 NOTE — TELEPHONE ENCOUNTER
Patient having urgency and more frequent urination. Patient stated that she has been tested for UTI and its not that. She said it comes and goes but has become more frequent lately. She would like to know should she see Urology for this? Please advise

## 2025-07-29 RX ORDER — ATORVASTATIN CALCIUM 40 MG/1
40 TABLET, FILM COATED ORAL DAILY
Qty: 30 TABLET | Refills: 0 | Status: SHIPPED | OUTPATIENT
Start: 2025-07-29

## 2025-07-29 RX ORDER — VALSARTAN 160 MG/1
160 TABLET ORAL DAILY
Qty: 30 TABLET | Refills: 0 | Status: SHIPPED | OUTPATIENT
Start: 2025-07-29

## 2025-08-08 ENCOUNTER — LAB (OUTPATIENT)
Dept: LAB | Facility: HOSPITAL | Age: 60
End: 2025-08-08
Payer: COMMERCIAL

## 2025-08-08 LAB
CHOLEST SERPL-MCNC: 176 MG/DL (ref 0–200)
HDLC SERPL QL: 2.75
HDLC SERPL-MCNC: 64 MG/DL (ref 40–60)
LDLC SERPL CALC-MCNC: 100 MG/DL (ref 0–100)
TRIGL SERPL-MCNC: 61 MG/DL (ref 0–150)
VLDLC SERPL-MCNC: 12 MG/DL (ref 5–40)

## 2025-08-08 PROCEDURE — 80061 LIPID PANEL: CPT | Performed by: INTERNAL MEDICINE

## 2025-08-12 ENCOUNTER — OFFICE VISIT (OUTPATIENT)
Dept: INTERNAL MEDICINE | Facility: CLINIC | Age: 60
End: 2025-08-12
Payer: COMMERCIAL

## 2025-08-12 VITALS
OXYGEN SATURATION: 98 % | HEART RATE: 68 BPM | WEIGHT: 140 LBS | HEIGHT: 62 IN | DIASTOLIC BLOOD PRESSURE: 60 MMHG | SYSTOLIC BLOOD PRESSURE: 110 MMHG | BODY MASS INDEX: 25.76 KG/M2 | TEMPERATURE: 98.3 F | RESPIRATION RATE: 18 BRPM

## 2025-08-12 DIAGNOSIS — I10 ESSENTIAL HYPERTENSION: Chronic | ICD-10-CM

## 2025-08-12 DIAGNOSIS — E78.00 HIGH CHOLESTEROL: Chronic | ICD-10-CM

## 2025-08-12 DIAGNOSIS — R19.7 DIARRHEA, UNSPECIFIED TYPE: ICD-10-CM

## 2025-08-12 DIAGNOSIS — E55.9 VITAMIN D DEFICIENCY: ICD-10-CM

## 2025-08-12 DIAGNOSIS — M81.6 LOCALIZED OSTEOPOROSIS WITHOUT CURRENT PATHOLOGICAL FRACTURE: ICD-10-CM

## 2025-08-12 DIAGNOSIS — Z00.00 ENCOUNTER FOR PREVENTIVE HEALTH EXAMINATION: Primary | ICD-10-CM

## 2025-08-12 PROCEDURE — 99396 PREV VISIT EST AGE 40-64: CPT | Performed by: INTERNAL MEDICINE

## 2025-08-28 RX ORDER — ATORVASTATIN CALCIUM 40 MG/1
40 TABLET, FILM COATED ORAL DAILY
Qty: 30 TABLET | Refills: 0 | Status: SHIPPED | OUTPATIENT
Start: 2025-08-28

## 2025-08-28 RX ORDER — VALSARTAN 160 MG/1
160 TABLET ORAL DAILY
Qty: 30 TABLET | Refills: 0 | Status: SHIPPED | OUTPATIENT
Start: 2025-08-28